# Patient Record
Sex: MALE | Race: WHITE | NOT HISPANIC OR LATINO | Employment: OTHER | URBAN - METROPOLITAN AREA
[De-identification: names, ages, dates, MRNs, and addresses within clinical notes are randomized per-mention and may not be internally consistent; named-entity substitution may affect disease eponyms.]

---

## 2017-02-23 ENCOUNTER — ANESTHESIA EVENT (OUTPATIENT)
Dept: GASTROENTEROLOGY | Facility: HOSPITAL | Age: 62
End: 2017-02-23
Payer: COMMERCIAL

## 2017-02-24 ENCOUNTER — HOSPITAL ENCOUNTER (OUTPATIENT)
Facility: HOSPITAL | Age: 62
Setting detail: OUTPATIENT SURGERY
Discharge: HOME | End: 2017-02-24
Attending: COLON & RECTAL SURGERY | Admitting: COLON & RECTAL SURGERY
Payer: COMMERCIAL

## 2017-02-24 ENCOUNTER — GENERIC CONVERSION - ENCOUNTER (OUTPATIENT)
Dept: OTHER | Facility: OTHER | Age: 62
End: 2017-02-24

## 2017-02-24 ENCOUNTER — ANESTHESIA (OUTPATIENT)
Dept: GASTROENTEROLOGY | Facility: HOSPITAL | Age: 62
End: 2017-02-24
Payer: COMMERCIAL

## 2017-02-24 VITALS
HEIGHT: 72 IN | RESPIRATION RATE: 16 BRPM | HEART RATE: 69 BPM | TEMPERATURE: 97 F | DIASTOLIC BLOOD PRESSURE: 75 MMHG | SYSTOLIC BLOOD PRESSURE: 125 MMHG | OXYGEN SATURATION: 96 % | WEIGHT: 246.91 LBS | BODY MASS INDEX: 33.44 KG/M2

## 2017-02-24 DIAGNOSIS — Z12.11 ENCOUNTER FOR SCREENING FOR MALIGNANT NEOPLASM OF COLON: ICD-10-CM

## 2017-02-24 PROCEDURE — 88305 TISSUE EXAM BY PATHOLOGIST: CPT | Performed by: COLON & RECTAL SURGERY

## 2017-02-24 RX ORDER — PROPOFOL 10 MG/ML
INJECTION, EMULSION INTRAVENOUS AS NEEDED
Status: DISCONTINUED | OUTPATIENT
Start: 2017-02-24 | End: 2017-02-24 | Stop reason: SURG

## 2017-02-24 RX ORDER — FENOFIBRATE 48 MG/1
48 TABLET, COATED ORAL DAILY
COMMUNITY
End: 2018-02-06 | Stop reason: SDUPTHER

## 2017-02-24 RX ORDER — PROPOFOL 10 MG/ML
INJECTION, EMULSION INTRAVENOUS CONTINUOUS PRN
Status: DISCONTINUED | OUTPATIENT
Start: 2017-02-24 | End: 2017-02-24 | Stop reason: SURG

## 2017-02-24 RX ORDER — LIDOCAINE HYDROCHLORIDE 10 MG/ML
INJECTION, SOLUTION INFILTRATION; PERINEURAL AS NEEDED
Status: DISCONTINUED | OUTPATIENT
Start: 2017-02-24 | End: 2017-02-24 | Stop reason: SURG

## 2017-02-24 RX ORDER — SODIUM CHLORIDE, SODIUM LACTATE, POTASSIUM CHLORIDE, CALCIUM CHLORIDE 600; 310; 30; 20 MG/100ML; MG/100ML; MG/100ML; MG/100ML
125 INJECTION, SOLUTION INTRAVENOUS CONTINUOUS
Status: DISCONTINUED | OUTPATIENT
Start: 2017-02-24 | End: 2017-02-24 | Stop reason: HOSPADM

## 2017-02-24 RX ADMIN — LIDOCAINE HYDROCHLORIDE 50 MG: 10 INJECTION, SOLUTION INFILTRATION; PERINEURAL at 10:35

## 2017-02-24 RX ADMIN — SODIUM CHLORIDE, SODIUM LACTATE, POTASSIUM CHLORIDE, AND CALCIUM CHLORIDE 125 ML/HR: .6; .31; .03; .02 INJECTION, SOLUTION INTRAVENOUS at 09:49

## 2017-02-24 RX ADMIN — PROPOFOL 140 MCG/KG/MIN: 10 INJECTION, EMULSION INTRAVENOUS at 10:35

## 2017-02-24 RX ADMIN — PROPOFOL 100 MG: 10 INJECTION, EMULSION INTRAVENOUS at 10:35

## 2017-09-20 ENCOUNTER — ALLSCRIPTS OFFICE VISIT (OUTPATIENT)
Dept: OTHER | Facility: OTHER | Age: 62
End: 2017-09-20

## 2017-09-20 DIAGNOSIS — Z00.00 ENCOUNTER FOR GENERAL ADULT MEDICAL EXAMINATION WITHOUT ABNORMAL FINDINGS: ICD-10-CM

## 2017-09-20 DIAGNOSIS — Z12.5 ENCOUNTER FOR SCREENING FOR MALIGNANT NEOPLASM OF PROSTATE: ICD-10-CM

## 2017-09-20 DIAGNOSIS — I10 ESSENTIAL (PRIMARY) HYPERTENSION: ICD-10-CM

## 2017-09-20 DIAGNOSIS — E78.5 HYPERLIPIDEMIA: ICD-10-CM

## 2017-10-27 NOTE — PROGRESS NOTES
Assessment  1  Annual physical exam (V70 0) (Z00 00)   2  Hyperlipidemia (272 4) (E78 5)   3  Hypertension, essential (401 9) (I10)    Plan  Annual physical exam, Hyperlipidemia, Hypertension, essential, Screening for prostate  cancer    · (1) CBC/PLT/DIFF; Status:Active; Requested for:49Ovj2561;    · (1) COMPREHENSIVE METABOLIC PANEL; Status:Active; Requested for:97Ias8530;    · (1) LIPID PANEL, FASTING; Status:Active; Requested for:94Zte3120;    · (1) PSA (SCREEN) (Dx V76 44 Screen for Prostate Cancer); Status:Active; Requested  for:21Dhi3035;   Hyperlipidemia    · Fenofibrate 48 MG Oral Tablet (Tricor); TAKE 1 TABLET DAILY    Discussion/Summary    #1 PEhyperlipidemia - check labs as above  Cont present medsHTN - BPs had improved with weight loss and increased exercise  BP acceptable now but weight has increased  REC: cont exercise  Cont weight loss effort  Check labs as aboveHM - I reviewed HM issues  REC: check labs as above  Refer for colonoscopy  Recheck 1 year or prn  Pt to call for problems or concerns in the interim  The patient was counseled regarding instructions for management,-- risk factor reductions,-- prognosis,-- patient and family education,-- impressions,-- risks and benefits of treatment options,-- importance of compliance with treatment  Possible side effects of new medications were reviewed with the patient/guardian today  The treatment plan was reviewed with the patient/guardian  The patient/guardian understands and agrees with the treatment plan   Impression: health maintenance visit  Currently, he eats an adequate diet and has an adequate exercise regimen  Prostate cancer screening: the risks and benefits of prostate cancer screening were discussed and PSA was ordered  Colorectal cancer screening: the risks and benefits of colorectal cancer screening were discussed and colonoscopy has been ordered  Screening lab work includes glucose and lipid profile   The risks and benefits of immunizations were discussed and immunizations are up to date  Advice and education were given regarding nutrition, aerobic exercise, weight loss and cardiovascular risk reduction  Possible side effects of new medications were reviewed with the patient/guardian today  Chief Complaint  6MO CK UP      History of Present Illness  as above - pt here fpr yearly exampt doing well  Last seen 9/17/16  Doing well   No new complaints  Still tries to walks 2-3mi, 3-4x a week  No CP, palp, lightheadedness or other CV symptomspt had colonoscopy in Feb - due again in 3 years  Up to date with eye examsoverdue for Uro kamari  Had seen Dr Prudencio Aguirre in the past, but has not seen him this year    The patient is being seen for a health maintenance evaluation  General Health: The patient's health since the last visit is described as good  He has regular dental visits  -- He denies vision problems  -- He denies hearing loss  Immunizations status: not up to date  Lifestyle:  He consumes a diverse and healthy diet  -- He does not have any weight concerns  -- He exercises regularly  -- He does not use tobacco -- He denies alcohol use  Screening: cancer screening reviewed and current  metabolic screening reviewed and current  risk screening reviewed and current  Review of Systems    Constitutional: No fever or chills, feels well, no tiredness, no recent weight gain or weight loss  Eyes: No complaints of eye pain, no red eyes, no discharge from eyes, no itchy eyes  ENT: no complaints of earache, no hearing loss, no nosebleeds, no nasal discharge, no sore throat, no hoarseness  Cardiovascular: No complaints of slow heart rate, no fast heart rate, no chest pain, no palpitations, no leg claudication, no lower extremity  Respiratory: No complaints of shortness of breath, no wheezing, no cough, no SOB on exertion, no orthopnea or PND     Gastrointestinal: No complaints of abdominal pain, no constipation, no nausea or vomiting, no diarrhea or bloody stools  Genitourinary: No complaints of dysuria, no incontinence, no hesitancy, no nocturia, no genital lesion, no testicular pain  Musculoskeletal: No complaints of arthralgia, no myalgias, no joint swelling or stiffness, no limb pain or swelling  Integumentary: No complaints of skin rash or skin lesions, no itching, no skin wound, no dry skin  Neurological: No compliants of headache, no confusion, no convulsions, no numbness or tingling, no dizziness or fainting, no limb weakness, no difficulty walking  Endocrine: No complaints of proptosis, no hot flashes, no muscle weakness, no erectile dysfunction, no deepening of the voice, no feelings of weakness  Hematologic/Lymphatic: No complaints of swollen glands, no swollen glands in the neck, does not bleed easily, no easy bruising  Active Problems  1  Annual physical exam (V70 0) (Z00 00)   2  History of allergy (V15 09) (Z88 9)   3  Hyperlipidemia (272 4) (E78 5)   4  Hypertension, essential (401 9) (I10)   5  Neck pain (723 1) (M54 2)   6  Screening for colorectal cancer (V76 51) (Z12 11,Z12 12)   7  Screening for prostate cancer (V76 44) (Z12 5)    Past Medical History    The active problems and past medical history were reviewed and updated today  Surgical History  1  History of Hernia Repair    Family History  Mother    1  Family history of Diabetes Mellitus (V18 0)  Father    2  Family history of Stroke Syndrome (V17 1)    Social History   · Denied: History of Alcohol Use (History)   · Denied: History of Daily Coffee Consumption (___ Cups/Day)   · Daily Cola Consumption (___ Cans/Day)   · Denied: History of Daily Tea Consumption (___ Cups/Day)   · Never A Smoker  The social history was reviewed and updated today  Current Meds   1  Fenofibrate 48 MG Oral Tablet; TAKE 1 TABLET DAILY;    Therapy: 31JPY7830 to (Houston Bynum)  Requested for: 08Apr2017; Last   Rx:08Apr2017 Ordered    The medication list was reviewed and updated today  Allergies  1  Penicillins    Vitals  Vital Signs    Recorded: 03XIE6269 11:33AM Recorded: 47MXF9676 10:43AM   Temperature  96 9 F   Heart Rate  76   Systolic 279, LUE, Sitting 986   Diastolic 86, LUE, Sitting 94   Height  5 ft 11 in   Weight  252 lb 4 oz   BMI Calculated  35 18   BSA Calculated  2 33     Physical Exam    Constitutional   General appearance: No acute distress, well appearing and well nourished  Head and Face   Head and face: Normal     Eyes   Conjunctiva and lids: No erythema, swelling or discharge  Pupils and irises: Equal, round, reactive to light  Ophthalmoscopic examination: Normal fundi and optic discs  Ears, Nose, Mouth, and Throat   External inspection of ears and nose: Normal     Otoscopic examination: Tympanic membranes translucent with normal light reflex  Canals patent without erythema  Nasal mucosa, septum, and turbinates: Normal without edema or erythema  Lips, teeth, and gums: Normal, good dentition  Oropharynx: Normal with no erythema, edema, exudate or lesions  Neck   Neck: Supple, symmetric, trachea midline, no masses  Thyroid: Normal, no thyromegaly  Pulmonary   Respiratory effort: No increased work of breathing or signs of respiratory distress  Auscultation of lungs: Clear to auscultation  Cardiovascular   Auscultation of heart: Normal rate and rhythm, normal S1 and S2, no murmurs  Carotid pulses: 2+ bilaterally  Abdominal aorta: Normal     Pedal pulses: 2+ bilaterally  Peripheral vascular exam: Normal     Examination of extremities for edema and/or varicosities: Normal     Abdomen   Abdomen: Non-tender, no masses  Liver and spleen: No hepatomegaly or splenomegaly  Lymphatic   Palpation of lymph nodes in neck: No lymphadenopathy  Palpation of lymph nodes in other areas: No lymphadenopathy      Musculoskeletal   Gait and station: Normal     Inspection/palpation of digits and nails: Normal without clubbing or cyanosis  Inspection/palpation of joints, bones, and muscles: Normal     Muscle strength/tone: Normal     Skin   Skin and subcutaneous tissue: Normal without rashes or lesions  Neurologic   Cranial nerves: Cranial nerves 2-12 intact  Cortical function: Normal mental status  Reflexes: 2+ and symmetric  Sensation: No sensory loss  Psychiatric   Judgment and insight: Normal     Orientation to person, place and time: Normal     Recent and remote memory: Intact  Mood and affect: Normal        Results/Data  PHQ-2 Adult Depression Screening 20Sep2017 10:46AM User, s     Test Name Result Flag Reference   PHQ-2 Adult Depression Score 0     Over the last two weeks, how often have you been bothered by any of the following problems? Little interest or pleasure in doing things: Not at all - 0  Feeling down, depressed, or hopeless: Not at all - 0   PHQ-2 Adult Depression Screening Negative         Health Management  Screening for colorectal cancer   COLONOSCOPY; every 10 years; Last 46KBH7125; Next Due: 54Ivb3479; Active    Future Appointments    Date/Time Provider Specialty Site   09/25/2018 09:45 AM JOE Robledo   [address] [place] [address]     Signatures   Electronically signed by : JOE Blancas ; Sep 22 2017  7:25AM EST                       (Author)

## 2017-11-07 ENCOUNTER — ALLSCRIPTS OFFICE VISIT (OUTPATIENT)
Dept: OTHER | Facility: OTHER | Age: 62
End: 2017-11-07

## 2018-01-11 NOTE — PROGRESS NOTES
Assessment    1  Hyperlipidemia (272 4) (E78 5)   2  Blood pressure elevated (401 9) (I10)   3  Annual physical exam (V70 0) (Z00 00)    Plan  Annual physical exam, Blood pressure elevated, Hyperlipidemia    · (1) CBC/PLT/DIFF; Status:Active; Requested for:23Vwd2285;    · (1) COMPREHENSIVE METABOLIC PANEL; Status:Active; Requested for:93Hka7493;    · (1) LIPID PANEL, FASTING; Status:Active; Requested for:22Jwu6569;    · (1) TSH; Status:Active; Requested for:21Vkx1088;   Hyperlipidemia    · Fenofibrate 48 MG Oral Tablet (Tricor); TAKE 1 TABLET DAILY  Screening for prostate cancer    · (1) PSA (SCREEN) (Dx V76 44 Screen for Prostate Cancer); Status:Active; Requested  for:20Sep2016;     Discussion/Summary  Impression: health maintenance visit  Currently, he eats an adequate diet and has an adequate exercise regimen  Prostate cancer screening: the risks and benefits of prostate cancer screening were discussed and PSA was ordered  Colorectal cancer screening: the risks and benefits of colorectal cancer screening were discussed and colonoscopy has been ordered  Screening lab work includes glucose and lipid profile  The risks and benefits of immunizations were discussed and immunizations are up to date  Advice and education were given regarding nutrition, aerobic exercise, weight loss and cardiovascular risk reduction  #1 PE  #2 hyperlipidemia - check labs as above  #3 allergy - stable  #4 HM - I reviewed HM issues  REC: check labs as above  Refer for colonoscopy  Recheck 1 year or prn  Pt to call for problems or concerns in the interim  Possible side effects of new medications were reviewed with the patient/guardian today  The patient was counseled regarding instructions for management, risk factor reductions, prognosis, patient and family education, impressions, risks and benefits of treatment options, importance of compliance with treatment     Possible side effects of new medications were reviewed with the patient/guardian today  The treatment plan was reviewed with the patient/guardian  The patient/guardian understands and agrees with the treatment plan      Chief Complaint  6MO CK UP      History of Present Illness  HM, Adult Male: The patient is being seen for a health maintenance evaluation  General Health: The patient's health since the last visit is described as good  He has regular dental visits  He denies vision problems  He denies hearing loss  Immunizations status: up to date  Lifestyle:  He consumes a diverse and healthy diet  He does not have any weight concerns  He exercises regularly  He does not use tobacco  He denies alcohol use  He denies drug use  Screening: cancer screening reviewed and current  metabolic screening reviewed and current  risk screening reviewed and current  HPI: as above - pt here fpr yearly exam  - pt doing well  Last seen 9/17/15    - Doing well with retirmbrad Rice 2-3mi 3-4x a week  No CP, palp, lightheadedness or other CV symptoms  - overdue for Uro kamari  Had seen Dr Agusto Alfaro in the past, but has not seen him this year  - never had a colonoscopy but is willing to go  [facility]end Up to date with eye exams      Review of Systems    Constitutional: No fever or chills, feels well, no tiredness, no recent weight gain or weight loss  Eyes: No complaints of eye pain, no red eyes, no discharge from eyes, no itchy eyes  ENT: no complaints of earache, no hearing loss, no nosebleeds, no nasal discharge, no sore throat, no hoarseness  Cardiovascular: No complaints of slow heart rate, no fast heart rate, no chest pain, no palpitations, no leg claudication, no lower extremity  Respiratory: No complaints of shortness of breath, no wheezing, no cough, no SOB on exertion, no orthopnea or PND  Gastrointestinal: No complaints of abdominal pain, no constipation, no nausea or vomiting, no diarrhea or bloody stools     Genitourinary: No complaints of dysuria, no incontinence, no hesitancy, no nocturia, no genital lesion, no testicular pain  Musculoskeletal: No complaints of arthralgia, no myalgias, no joint swelling or stiffness, no limb pain or swelling  Integumentary: No complaints of skin rash or skin lesions, no itching, no skin wound, no dry skin  Neurological: No compliants of headache, no confusion, no convulsions, no numbness or tingling, no dizziness or fainting, no limb weakness, no difficulty walking  Endocrine: No complaints of proptosis, no hot flashes, no muscle weakness, no erectile dysfunction, no deepening of the voice, no feelings of weakness  Hematologic/Lymphatic: No complaints of swollen glands, no swollen glands in the neck, does not bleed easily, no easy bruising  Active Problems    1  Blood pressure elevated (401 9) (I10)   2  History of allergy (V15 09) (Z88 9)   3  Hyperlipidemia (272 4) (E78 5)   4  Neck pain (723 1) (M54 2)   5  Screening for colorectal cancer (V76 51) (Z12 11,Z12 12)   6  Screening for prostate cancer (V76 44) (Z12 5)    Past Medical History    The active problems and past medical history were reviewed and updated today  Surgical History    · History of Hernia Repair    Family History  Mother    · Family history of Diabetes Mellitus (V18 0)  Father    · Family history of Stroke Syndrome (V17 1)    Social History    · Denied: History of Alcohol Use (History)   · Denied: History of Daily Coffee Consumption (___ Cups/Day)   · Daily Cola Consumption (___ Cans/Day)   · Denied: History of Daily Tea Consumption (___ Cups/Day)   · Never A Smoker  The social history was reviewed and updated today  Current Meds   1  Fenofibrate 48 MG Oral Tablet; TAKE 1 TABLET DAILY; Therapy: 82FDK5212 to (Renew:01Oct2016)  Requested for: 04Apr2016; Last   Rx:04Apr2016 Ordered    The medication list was reviewed and updated today  Allergies    1   Penicillins    Vitals   Recorded: 91DQX6083 11:18AM Recorded: 86PDU6871 83:15JS   Systolic 927, HUY Sitting 134   Diastolic 86, LUE, Sitting 80   Heart Rate  80   Temperature  97 7 F   Height  5 ft 11 in   Weight  248 lb    BMI Calculated  34 59   BSA Calculated  2 3     Physical Exam    Constitutional   General appearance: No acute distress, well appearing and well nourished  Head and Face   Head and face: Normal     Eyes   Conjunctiva and lids: No erythema, swelling or discharge  Pupils and irises: Equal, round, reactive to light  Ophthalmoscopic examination: Normal fundi and optic discs  Ears, Nose, Mouth, and Throat   External inspection of ears and nose: Normal     Otoscopic examination: Tympanic membranes translucent with normal light reflex  Canals patent without erythema  Nasal mucosa, septum, and turbinates: Normal without edema or erythema  Lips, teeth, and gums: Normal, good dentition  Oropharynx: Normal with no erythema, edema, exudate or lesions  Neck   Neck: Supple, symmetric, trachea midline, no masses  Thyroid: Normal, no thyromegaly  Pulmonary   Respiratory effort: No increased work of breathing or signs of respiratory distress  Auscultation of lungs: Clear to auscultation  Cardiovascular   Auscultation of heart: Normal rate and rhythm, normal S1 and S2, no murmurs  Carotid pulses: 2+ bilaterally  Abdominal aorta: Normal     Pedal pulses: 2+ bilaterally  Peripheral vascular exam: Normal     Examination of extremities for edema and/or varicosities: Normal     Abdomen   Abdomen: Non-tender, no masses  Liver and spleen: No hepatomegaly or splenomegaly  Lymphatic   Palpation of lymph nodes in neck: No lymphadenopathy  Palpation of lymph nodes in other areas: No lymphadenopathy  Musculoskeletal   Gait and station: Normal     Inspection/palpation of digits and nails: Normal without clubbing or cyanosis      Inspection/palpation of joints, bones, and muscles: Normal     Muscle strength/tone: Normal     Skin   Skin and subcutaneous tissue: Normal without rashes or lesions  Neurologic   Cranial nerves: Cranial nerves 2-12 intact  Cortical function: Normal mental status  Reflexes: 2+ and symmetric  Sensation: No sensory loss  Coordination: Normal finger to nose and heel to shin  Psychiatric   Judgment and insight: Normal     Orientation to person, place and time: Normal     Recent and remote memory: Intact  Mood and affect: Normal        Results/Data  PHQ-2 Adult Depression Screening 20Sep2016 10:44AM User, Ahs     Test Name Result Flag Reference   PHQ-2 Adult Depression Score 0     Over the last two weeks, how often have you been bothered by any of the following problems? Little interest or pleasure in doing things: Not at all - 0  Feeling down, depressed, or hopeless: Not at all - 0   PHQ-2 Adult Depression Screening Negative         Health Management  Screening for colorectal cancer   COLONOSCOPY; every 10 years; Next Due: [id number]; Overdue    Future Appointments    Date/Time Provider Specialty Site   09/20/2017 10:45 AM JOE Sherman   [address] [facility]     Signatures   Electronically signed by : JOE Shannon ; Sep 21 2016  8:38AM EST                       (Author)

## 2018-01-14 VITALS
HEIGHT: 71 IN | SYSTOLIC BLOOD PRESSURE: 138 MMHG | DIASTOLIC BLOOD PRESSURE: 86 MMHG | WEIGHT: 252.25 LBS | HEART RATE: 76 BPM | TEMPERATURE: 96.9 F | BODY MASS INDEX: 35.31 KG/M2

## 2018-01-14 NOTE — PROGRESS NOTES
Chief Complaint  PT IS HERE FOR A FLU SHOT      Active Problems    1  Annual physical exam (V70 0) (Z00 00)   2  History of allergy (V15 09) (Z88 9)   3  Hyperlipidemia (272 4) (E78 5)   4  Hypertension, essential (401 9) (I10)   5  Neck pain (723 1) (M54 2)   6  Screening for colorectal cancer (V76 51) (Z12 11,Z12 12)   7  Screening for prostate cancer (V76 44) (Z12 5)    Current Meds   1  Fenofibrate 48 MG Oral Tablet; TAKE 1 TABLET DAILY; Therapy: 39FBS5298 to (Evaluate:18Jan2018)  Requested for: 12AVU6854; Last   Rx:93Iqw4948 Ordered    Allergies    1  Penicillins    Plan  Need for influenza vaccination    · Fluzone Quadrivalent Intramuscular Suspension    Future Appointments    Date/Time Provider Specialty Site   09/25/2018 09:45 AM JOE Quevedo   [address] [facility]     Signatures   Electronically signed by : Hammad Caro, ; Nov 7 2017 10:11AM EST                       (Author)    Electronically signed by : JOE Valencia ; Nov 7 2017 10:21AM EST                       (Author)

## 2018-02-06 ENCOUNTER — TELEPHONE (OUTPATIENT)
Dept: FAMILY MEDICINE CLINIC | Facility: CLINIC | Age: 63
End: 2018-02-06

## 2018-02-06 DIAGNOSIS — E78.2 MIXED HYPERLIPIDEMIA: Primary | ICD-10-CM

## 2018-02-06 RX ORDER — FENOFIBRATE 48 MG/1
48 TABLET, COATED ORAL DAILY
Qty: 90 TABLET | Refills: 3 | Status: SHIPPED | OUTPATIENT
Start: 2018-02-06 | End: 2019-03-07

## 2018-06-08 ENCOUNTER — OFFICE VISIT (OUTPATIENT)
Dept: FAMILY MEDICINE CLINIC | Facility: CLINIC | Age: 63
End: 2018-06-08
Payer: COMMERCIAL

## 2018-06-08 VITALS
SYSTOLIC BLOOD PRESSURE: 170 MMHG | DIASTOLIC BLOOD PRESSURE: 98 MMHG | HEIGHT: 72 IN | BODY MASS INDEX: 33.94 KG/M2 | WEIGHT: 250.6 LBS | HEART RATE: 68 BPM | TEMPERATURE: 97.3 F

## 2018-06-08 DIAGNOSIS — J30.2 SEASONAL ALLERGIC RHINITIS, UNSPECIFIED TRIGGER: ICD-10-CM

## 2018-06-08 DIAGNOSIS — R05.9 COUGH: Primary | ICD-10-CM

## 2018-06-08 PROCEDURE — 99213 OFFICE O/P EST LOW 20 MIN: CPT | Performed by: NURSE PRACTITIONER

## 2018-06-08 RX ORDER — BENZONATATE 100 MG/1
100 CAPSULE ORAL 3 TIMES DAILY PRN
Qty: 15 CAPSULE | Refills: 0 | Status: SHIPPED | OUTPATIENT
Start: 2018-06-08 | End: 2018-06-13

## 2018-06-08 NOTE — PROGRESS NOTES
Patient ID: Sim Phelan  is a 57 y o  male  HPI: 57 y o male presenting with nasal congestion and cough for past week  The nasal congestion is improving since starting to take OTC guaifenesin  Patient also reports he as been using Delsym cough medication without cough control  SUBJECTIVE    Family History   Problem Relation Age of Onset    Diabetes Mother     Ulcerative colitis Son     Stroke Father      SYNDROME      Social History     Social History    Marital status: /Civil Union     Spouse name: N/A    Number of children: N/A    Years of education: N/A     Occupational History    Not on file  Social History Main Topics    Smoking status: Never Smoker    Smokeless tobacco: Not on file    Alcohol use Yes      Comment: 1-2 per week; DENIED: HISTORY OF ALCOHOL USE AS PER ALL SCRIPTS    Drug use: No    Sexual activity: Not on file     Other Topics Concern    Not on file     Social History Narrative    Denied: history of daily coffee consumption    Daily cola consumption    Denied: history of daily tea consumption      Past Medical History:   Diagnosis Date    Elevated cholesterol with high triglycerides      Past Surgical History:   Procedure Laterality Date    HERNIA REPAIR Left     inguinal    [place] COLONOSCOPY FLX DX W/COLLJ SPEC WHEN PFRMD N/A 2/24/2017    Procedure: COLONOSCOPY;  Surgeon: Timothy Rodriguez MD;  Location: [facility];   Service: Colorectal     Allergies   Allergen Reactions    Penicillins Rash       Current Outpatient Prescriptions:     fenofibrate (TRICOR) 48 mg tablet, Take 1 tablet (48 mg total) by mouth daily for 360 days, Disp: 90 tablet, Rfl: 3    benzonatate (TESSALON PERLES) 100 mg capsule, Take 1 capsule (100 mg total) by mouth 3 (three) times a day as needed for cough for up to 5 days, Disp: 15 capsule, Rfl: 0    Review of Systems    Consitutional:  Denies chills, fatigue and fever   ENT:  Positive for nasal congestion for thick clear secretions Denies nasal discharge, post nasal drip, sore throat, hoarseness, itchy/watery eyes and sneezing  Pulmonary:  Positive for loose productive cough for clear to white phelgm    Denies shortness of breath  dyspnea on exertion or wheezing  Cardiovascular:  Denies chest pain/pressure   Musculoskeletal:  Denies myalgia, arthalgia or muscle weakness    Integumentary:  Denies rash or skin lesions   Neurological:  Denies headaches, dizziness, confusion, loss of consciousness or behavioral changes  Psychological:  Denies anxiety, depression or sleep disturbances      OBJECTIVE    /98   Pulse 68   Temp (!) 97 3 °F (36 3 °C)   Ht 6' (1 829 m)   Wt 114 kg (250 lb 9 6 oz)   BMI 33 99 kg/m²     Constitutional:  Well appearing and in no acute distress  ENT:  TM normal without fluid or infection, slight posterior pharynx erythema with   post nasal drip noted, sinuses non-tender and nasal mucosa pale and congested   Pulmonary:  clear to auscultation bilaterally and no crackles, no wheezes, chest expansion normal  Cardiovascular:  S1S2, regular rate and rhythm   Blood pressure elevated with potential link to OTC cough medication usage  Lymphatic:  no lymphadenopathy   Musculoskeletal:  no muscular tenderness noted  Skin:  skin color, texture and turgor are normal; no bruising, rashes or lesions noted  Neurologic:  Alert and oriented x 4 and Affect and mood normal      Assessment/Plan:  Diagnoses and all orders for this visit:    Cough  -     benzonatate (TESSALON PERLES) 100 mg capsule; Take 1 capsule (100 mg total) by mouth 3 (three) times a day as needed for cough for up to 5 days    Seasonal allergic rhinitis, unspecified trigger      #1 Cough  Reviewed with patient plan to treat with benzonatate 100 mg three times a day as needed for cough    #2 Seasonal allergic rhinitis  Reviewed with patient plan to start an OTC anti-histamine  Patient instructed to call in 72 hours if not feeling better or if symptoms worsen

## 2018-06-19 ENCOUNTER — OFFICE VISIT (OUTPATIENT)
Dept: FAMILY MEDICINE CLINIC | Facility: CLINIC | Age: 63
End: 2018-06-19
Payer: COMMERCIAL

## 2018-06-19 VITALS
BODY MASS INDEX: 33.83 KG/M2 | HEART RATE: 72 BPM | RESPIRATION RATE: 16 BRPM | SYSTOLIC BLOOD PRESSURE: 154 MMHG | TEMPERATURE: 97.6 F | DIASTOLIC BLOOD PRESSURE: 82 MMHG | HEIGHT: 72 IN | WEIGHT: 249.8 LBS

## 2018-06-19 DIAGNOSIS — J18.0 BRONCHOPNEUMONIA: Primary | ICD-10-CM

## 2018-06-19 PROCEDURE — 99213 OFFICE O/P EST LOW 20 MIN: CPT | Performed by: FAMILY MEDICINE

## 2018-06-19 RX ORDER — LEVOFLOXACIN 500 MG/1
500 TABLET, FILM COATED ORAL EVERY 24 HOURS
Qty: 10 TABLET | Refills: 0 | Status: SHIPPED | OUTPATIENT
Start: 2018-06-19 | End: 2018-06-29

## 2018-06-19 RX ORDER — DEXTROMETHORPHAN HYDROBROMIDE AND PROMETHAZINE HYDROCHLORIDE 15; 6.25 MG/5ML; MG/5ML
5 SYRUP ORAL
Qty: 118 ML | Refills: 0 | Status: SHIPPED | OUTPATIENT
Start: 2018-06-19 | End: 2018-11-02 | Stop reason: ALTCHOICE

## 2018-06-19 NOTE — PROGRESS NOTES
Assessment/Plan:      There are no diagnoses linked to this encounter  Subjective:     Patient ID: Megan Griffith  is a 57 y o  male  2+ week hx of cough  Started after having URI symptoms  Seen on 6/8 by NP Calin Somers and started on tessalon  Pt did not improve and is here for recheck  No fever/chills, fatigue, or SOB  Pt does have a productive cough  No nasal congestion  No Hx of smoking  Review of Systems   Constitutional: Negative  HENT: Negative  Eyes: Negative  Respiratory: Positive for cough  Negative for chest tightness, shortness of breath and wheezing  Cardiovascular: Negative  Musculoskeletal: Negative  Objective:     Physical Exam   Constitutional: He appears well-developed and well-nourished  HENT:   Head: Normocephalic and atraumatic  Right Ear: External ear normal    Left Ear: External ear normal    Nose: Nose normal    Mouth/Throat: Oropharynx is clear and moist    Eyes: Conjunctivae and EOM are normal  Pupils are equal, round, and reactive to light  Neck: Normal range of motion  Cardiovascular: Normal rate, regular rhythm and intact distal pulses  Pulmonary/Chest: Effort normal  No respiratory distress  He has wheezes  He exhibits no tenderness  Focal wheeze/rhonchi in MAK anterior and posterior   Lymphadenopathy:     He has no cervical adenopathy  Skin: Skin is warm

## 2018-07-09 ENCOUNTER — APPOINTMENT (OUTPATIENT)
Dept: RADIOLOGY | Facility: MEDICAL CENTER | Age: 63
End: 2018-07-09
Payer: COMMERCIAL

## 2018-07-09 DIAGNOSIS — J18.0 BRONCHOPNEUMONIA: ICD-10-CM

## 2018-07-09 PROCEDURE — 71046 X-RAY EXAM CHEST 2 VIEWS: CPT

## 2018-07-27 ENCOUNTER — OFFICE VISIT (OUTPATIENT)
Dept: FAMILY MEDICINE CLINIC | Facility: CLINIC | Age: 63
End: 2018-07-27
Payer: COMMERCIAL

## 2018-07-27 VITALS
HEART RATE: 80 BPM | DIASTOLIC BLOOD PRESSURE: 82 MMHG | HEIGHT: 72 IN | SYSTOLIC BLOOD PRESSURE: 126 MMHG | WEIGHT: 246.8 LBS | BODY MASS INDEX: 33.43 KG/M2 | TEMPERATURE: 97.1 F

## 2018-07-27 DIAGNOSIS — H60.501 ACUTE OTITIS EXTERNA OF RIGHT EAR, UNSPECIFIED TYPE: Primary | ICD-10-CM

## 2018-07-27 PROCEDURE — 99213 OFFICE O/P EST LOW 20 MIN: CPT | Performed by: NURSE PRACTITIONER

## 2018-07-27 RX ORDER — CIPROFLOXACIN AND DEXAMETHASONE 3; 1 MG/ML; MG/ML
4 SUSPENSION/ DROPS AURICULAR (OTIC) 2 TIMES DAILY
Qty: 7.5 ML | Refills: 0 | Status: SHIPPED | OUTPATIENT
Start: 2018-07-27 | End: 2018-11-02 | Stop reason: ALTCHOICE

## 2018-07-27 NOTE — PROGRESS NOTES
Patient ID: Paul Barger  is a 57 y o  male  HPI: 57 y o male presenting with right ear pressure for past three days  He reports that he cleaned his ear wax out for few days ago and than the pressure started  Denies fever, chills, nasal congestion, cough or general fatigue with the above symptom  SUBJECTIVE    Family History   Problem Relation Age of Onset    Diabetes Mother     Ulcerative colitis Son     Stroke Father         SYNDROME      Social History     Social History    Marital status: /Civil Union     Spouse name: N/A    Number of children: N/A    Years of education: N/A     Occupational History    Not on file  Social History Main Topics    Smoking status: Never Smoker    Smokeless tobacco: Not on file    Alcohol use Yes      Comment: 1-2 per week; DENIED: HISTORY OF ALCOHOL USE AS PER ALL SCRIPTS    Drug use: No    Sexual activity: Not on file     Other Topics Concern    Not on file     Social History Narrative    Denied: history of daily coffee consumption    Daily cola consumption    Denied: history of daily tea consumption      Past Medical History:   Diagnosis Date    Elevated cholesterol with high triglycerides      Past Surgical History:   Procedure Laterality Date    HERNIA REPAIR Left     inguinal    [place] COLONOSCOPY FLX DX W/COLLJ SPEC WHEN PFRMD N/A 2/24/2017    Procedure: COLONOSCOPY;  Surgeon: Alesha Fox MD;  Location: [facility];   Service: Colorectal     Allergies   Allergen Reactions    Penicillins Rash       Current Outpatient Prescriptions:     fenofibrate (TRICOR) 48 mg tablet, Take 1 tablet (48 mg total) by mouth daily for 360 days, Disp: 90 tablet, Rfl: 3    promethazine-dextromethorphan (PHENERGAN-DM) 6 25-15 mg/5 mL oral syrup, Take 5 mL by mouth daily at bedtime, Disp: 118 mL, Rfl: 0    Review of Systems    Consitutional:  Denies chills, fatigue and fever   ENT:  Positive for right ear pressure    Denies loss of hearing, nasal congestion, post nasal drip, sore throat and hoarseness   Pulmonary:  Denies cough, shortness of breath or dyspnea on exertion    Cardiovascular:  Denies chest pain/pressure     Integumentary:   Denies ecchymosis, petechiae, rash or lesions  Neurological:  Denies headaches, dizziness, confusion, loss of consciousness or behavioral changes  Psychological:  Denies anxiety, depression or sleep disturbances      OBJECTIVE    /82   Pulse 80   Temp (!) 97 1 °F (36 2 °C)   Ht 6' (1 829 m)   Wt 112 kg (246 lb 12 8 oz)   BMI 33 47 kg/m²     Constitutional:  Well appearing and in no acute distress  ENT:  Right TM dull and canal erythematous, pain on palpation of external ear, throat normal without erythema or exudate, sinuses non-tender and nasal mucosa non-congested   Pulmonary:  clear to auscultation bilaterally and no crackles, no wheezes, chest expansion normal  Cardiovascular:  S1S2, regular rate and rhythm  Lymphatic:  no lymphadenopathy   Skin:  skin color, texture and turgor are normal; no bruising, rashes or lesions noted  Neurologic:  Alert and oriented x 4 and Affect and mood normal      Assessment/Plan:  Diagnoses and all orders for this visit:    Acute otitis externa of right ear, unspecified type  -     ciprofloxacin-dexamethasone (CIPRODEX) otic suspension; Administer 4 drops to the right ear 2 (two) times a day      Acute otitis externa of right ear  Reviewed with patient plan to treat with Ciprodex otic suspension - 4 drops in right ear twice a day for 5 days  Patient instructed to call in 72 hours if not feeling better or if symptoms worsen

## 2018-09-26 ENCOUNTER — OFFICE VISIT (OUTPATIENT)
Dept: FAMILY MEDICINE CLINIC | Facility: CLINIC | Age: 63
End: 2018-09-26
Payer: COMMERCIAL

## 2018-09-26 VITALS
HEART RATE: 72 BPM | TEMPERATURE: 96.7 F | SYSTOLIC BLOOD PRESSURE: 152 MMHG | BODY MASS INDEX: 33.72 KG/M2 | DIASTOLIC BLOOD PRESSURE: 96 MMHG | HEIGHT: 72 IN | WEIGHT: 249 LBS

## 2018-09-26 DIAGNOSIS — Z23 IMMUNIZATION DUE: ICD-10-CM

## 2018-09-26 DIAGNOSIS — I10 HYPERTENSION, ESSENTIAL: Primary | ICD-10-CM

## 2018-09-26 DIAGNOSIS — E78.2 MIXED HYPERLIPIDEMIA: ICD-10-CM

## 2018-09-26 DIAGNOSIS — C61 PROSTATE CANCER (HCC): ICD-10-CM

## 2018-09-26 PROCEDURE — G0009 ADMIN PNEUMOCOCCAL VACCINE: HCPCS

## 2018-09-26 PROCEDURE — 99213 OFFICE O/P EST LOW 20 MIN: CPT | Performed by: FAMILY MEDICINE

## 2018-09-26 PROCEDURE — 90670 PCV13 VACCINE IM: CPT

## 2018-09-26 PROCEDURE — G0008 ADMIN INFLUENZA VIRUS VAC: HCPCS

## 2018-09-26 PROCEDURE — 90662 IIV NO PRSV INCREASED AG IM: CPT

## 2018-09-26 NOTE — PROGRESS NOTES
Assessment/Plan:    Hypertension, essential  Blood pressure elevated today  Recheck shows blood pressure still be elevated  I will have him come back in 2 weeks  He will bring his home blood pressure monitor to check for accuracy  If still elevated, we will adjust medications  Check labs in the interim    Hyperlipidemia  Check labs as above  Diagnoses and all orders for this visit:    Hypertension, essential  -     CBC and differential; Future  -     Comprehensive metabolic panel; Future  -     Lipid panel; Future    Mixed hyperlipidemia    Prostate cancer (HCC)  -     PSA, Total Screen; Future    Immunization due  -     Cancel: SYRINGE/SINGLE-DOSE VIAL: influenza vaccine, [id number], quadrivalent, 0 5 mL, preservative-free, for patients 3+ yr (FLUZONE)  -     PNEUMOCOCCAL CONJUGATE VACCINE 13-VALENT GREATER THAN 6 MONTHS  -     influenza vaccine, [id number], high-dose, PF 0 5 mL, for patients 65 yr+ (FLUZONE HIGH-DOSE)          Subjective:      Patient ID: Sarmad Lee  is a 70 y o  male  f/u multiple med issues  - patient states that he has been doing well  He is compliant with medications  He denies chest pain, palpitations, lightheadedness or other cardiovascular symptoms with without exertion  Patient is not exercising regularly but is trying to watch diet  -patient denies any GI or  complaints at present  Patient is up-to-date with colonoscopy  - patient denies any other problems or concerns  Patient is overdue for labs  The following portions of the patient's history were reviewed and updated as appropriate:   He  has a past medical history of Elevated cholesterol with high triglycerides  He   Patient Active Problem List    Diagnosis Date Noted    Hypertension, essential 03/11/2015    Hyperlipidemia 11/08/2012     He  has a past surgical history that includes Hernia repair (Left) and pr colonoscopy flx dx w/collj spec when pfrmd (N/A, 2/24/2017)    He  reports that he has never smoked  He does not have any smokeless tobacco history on file  He reports that he drinks alcohol  He reports that he does not use drugs  Current Outpatient Prescriptions   Medication Sig Dispense Refill    fenofibrate (TRICOR) 48 mg tablet Take 1 tablet (48 mg total) by mouth daily for 360 days 90 tablet 3    ciprofloxacin-dexamethasone (CIPRODEX) otic suspension Administer 4 drops to the right ear 2 (two) times a day (Patient not taking: Reported on 9/26/2018 ) 7 5 mL 0    promethazine-dextromethorphan (PHENERGAN-DM) 6 25-15 mg/5 mL oral syrup Take 5 mL by mouth daily at bedtime (Patient not taking: Reported on 9/26/2018 ) 118 mL 0     No current facility-administered medications for this visit  He is allergic to penicillins       Review of Systems   Constitutional: Negative  HENT: Negative  Eyes: Negative  Respiratory: Negative  Cardiovascular: Negative  Gastrointestinal: Negative  Endocrine: Negative  Genitourinary: Negative  Musculoskeletal: Negative  Skin: Negative  Allergic/Immunologic: Negative  Neurological: Negative  Hematological: Negative  Psychiatric/Behavioral: Negative  Objective:      /96 (BP Location: Right leg, Patient Position: Sitting, Cuff Size: Standard)   Pulse 72   Temp (!) 96 7 °F (35 9 °C)   Ht 6' (1 829 m)   Wt 113 kg (249 lb)   BMI 33 77 kg/m²          Physical Exam   Constitutional: He is oriented to person, place, and time  He appears well-developed and well-nourished  HENT:   Head: Normocephalic and atraumatic  Right Ear: External ear normal    Left Ear: External ear normal    Nose: Nose normal    Mouth/Throat: Oropharynx is clear and moist    Eyes: Pupils are equal, round, and reactive to light  Conjunctivae and EOM are normal    Neck: Normal range of motion  Neck supple  No thyromegaly present  Cardiovascular: Normal rate, regular rhythm, normal heart sounds and intact distal pulses      No murmur heard   Pulmonary/Chest: Effort normal and breath sounds normal    Abdominal: Soft  Bowel sounds are normal  He exhibits no distension and no mass  There is no tenderness  Musculoskeletal: Normal range of motion  He exhibits no edema or tenderness  Lymphadenopathy:     He has no cervical adenopathy  Neurological: He is alert and oriented to person, place, and time  No cranial nerve deficit  Coordination normal    Skin: Skin is warm and dry  Psychiatric: He has a normal mood and affect   His behavior is normal  Judgment and thought content normal

## 2018-09-28 NOTE — ASSESSMENT & PLAN NOTE
Blood pressure elevated today  Recheck shows blood pressure still be elevated  I will have him come back in 2 weeks  He will bring his home blood pressure monitor to check for accuracy  If still elevated, we will adjust medications    Check labs in the interim

## 2018-10-10 ENCOUNTER — CLINICAL SUPPORT (OUTPATIENT)
Dept: FAMILY MEDICINE CLINIC | Facility: CLINIC | Age: 63
End: 2018-10-10
Payer: COMMERCIAL

## 2018-10-10 VITALS — HEART RATE: 72 BPM | SYSTOLIC BLOOD PRESSURE: 172 MMHG | DIASTOLIC BLOOD PRESSURE: 100 MMHG

## 2018-10-10 DIAGNOSIS — I10 ESSENTIAL HYPERTENSION: Primary | ICD-10-CM

## 2018-10-10 PROCEDURE — 99211 OFF/OP EST MAY X REQ PHY/QHP: CPT

## 2018-10-10 RX ORDER — AMLODIPINE BESYLATE 5 MG/1
5 TABLET ORAL DAILY
Qty: 30 TABLET | Refills: 5 | Status: CANCELLED | OUTPATIENT
Start: 2018-10-10

## 2018-10-10 RX ORDER — AMLODIPINE BESYLATE 5 MG/1
5 TABLET ORAL DAILY
Qty: 30 TABLET | Refills: 5 | Status: SHIPPED | OUTPATIENT
Start: 2018-10-10 | End: 2019-04-08 | Stop reason: SDUPTHER

## 2018-10-10 NOTE — PROGRESS NOTES
Patient here for blood pressure recheck today, elevated at last visit  No symptoms  Dr Zeenat Knott is going to add Amlodipine 5 mg one tablet po daily  Recheck blood pressure with nurse in two weeks

## 2018-11-02 ENCOUNTER — CLINICAL SUPPORT (OUTPATIENT)
Dept: FAMILY MEDICINE CLINIC | Facility: CLINIC | Age: 63
End: 2018-11-02
Payer: MEDICARE

## 2018-11-02 VITALS — DIASTOLIC BLOOD PRESSURE: 82 MMHG | SYSTOLIC BLOOD PRESSURE: 132 MMHG

## 2018-11-02 DIAGNOSIS — I10 HYPERTENSION, UNSPECIFIED TYPE: Primary | ICD-10-CM

## 2018-11-02 PROCEDURE — 99211 OFF/OP EST MAY X REQ PHY/QHP: CPT

## 2018-11-02 NOTE — PROGRESS NOTES
Pt here today for BP check after starting Amlodipine 5 mg ,Pt feels good on this medication ,first reading was ok but left Pt sit for about 15 min and rechecked and is BP reading was WNL Pt to continue present TX  ,Pt to schedule F/U appoint with Dr Marina Thayer in about 4 months just to make sure BP is stable and to call if any problems prior to that appoint

## 2018-11-06 ENCOUNTER — APPOINTMENT (OUTPATIENT)
Dept: LAB | Facility: CLINIC | Age: 63
End: 2018-11-06
Payer: MEDICARE

## 2018-11-06 DIAGNOSIS — I10 HYPERTENSION, ESSENTIAL: ICD-10-CM

## 2018-11-06 DIAGNOSIS — C61 PROSTATE CANCER (HCC): ICD-10-CM

## 2018-11-06 LAB
ALBUMIN SERPL BCP-MCNC: 3.8 G/DL (ref 3.5–5)
ALP SERPL-CCNC: 83 U/L (ref 46–116)
ALT SERPL W P-5'-P-CCNC: 29 U/L (ref 12–78)
ANION GAP SERPL CALCULATED.3IONS-SCNC: 3 MMOL/L (ref 4–13)
AST SERPL W P-5'-P-CCNC: 24 U/L (ref 5–45)
BASOPHILS # BLD AUTO: 0.07 THOUSANDS/ΜL (ref 0–0.1)
BASOPHILS NFR BLD AUTO: 1 % (ref 0–1)
BILIRUB SERPL-MCNC: 0.7 MG/DL (ref 0.2–1)
BUN SERPL-MCNC: 16 MG/DL (ref 5–25)
CALCIUM SERPL-MCNC: 8.8 MG/DL (ref 8.3–10.1)
CHLORIDE SERPL-SCNC: 104 MMOL/L (ref 100–108)
CHOLEST SERPL-MCNC: 185 MG/DL (ref 50–200)
CO2 SERPL-SCNC: 27 MMOL/L (ref 21–32)
CREAT SERPL-MCNC: 0.99 MG/DL (ref 0.6–1.3)
EOSINOPHIL # BLD AUTO: 0.23 THOUSAND/ΜL (ref 0–0.61)
EOSINOPHIL NFR BLD AUTO: 3 % (ref 0–6)
ERYTHROCYTE [DISTWIDTH] IN BLOOD BY AUTOMATED COUNT: 12.9 % (ref 11.6–15.1)
GFR SERPL CREATININE-BSD FRML MDRD: 80 ML/MIN/1.73SQ M
GLUCOSE P FAST SERPL-MCNC: 95 MG/DL (ref 65–99)
HCT VFR BLD AUTO: 49.5 % (ref 36.5–49.3)
HDLC SERPL-MCNC: 30 MG/DL (ref 40–60)
HGB BLD-MCNC: 16.2 G/DL (ref 12–17)
IMM GRANULOCYTES # BLD AUTO: 0.03 THOUSAND/UL (ref 0–0.2)
IMM GRANULOCYTES NFR BLD AUTO: 0 % (ref 0–2)
LDLC SERPL CALC-MCNC: 126 MG/DL (ref 0–100)
LYMPHOCYTES # BLD AUTO: 1.48 THOUSANDS/ΜL (ref 0.6–4.47)
LYMPHOCYTES NFR BLD AUTO: 20 % (ref 14–44)
MCH RBC QN AUTO: 29.3 PG (ref 26.8–34.3)
MCHC RBC AUTO-ENTMCNC: 32.7 G/DL (ref 31.4–37.4)
MCV RBC AUTO: 90 FL (ref 82–98)
MONOCYTES # BLD AUTO: 0.74 THOUSAND/ΜL (ref 0.17–1.22)
MONOCYTES NFR BLD AUTO: 10 % (ref 4–12)
NEUTROPHILS # BLD AUTO: 4.88 THOUSANDS/ΜL (ref 1.85–7.62)
NEUTS SEG NFR BLD AUTO: 66 % (ref 43–75)
NONHDLC SERPL-MCNC: 155 MG/DL
NRBC BLD AUTO-RTO: 0 /100 WBCS
PLATELET # BLD AUTO: 231 THOUSANDS/UL (ref 149–390)
PMV BLD AUTO: 10.6 FL (ref 8.9–12.7)
POTASSIUM SERPL-SCNC: 3.9 MMOL/L (ref 3.5–5.3)
PROT SERPL-MCNC: 7 G/DL (ref 6.4–8.2)
PSA SERPL-MCNC: 1.6 NG/ML (ref 0–4)
RBC # BLD AUTO: 5.52 MILLION/UL (ref 3.88–5.62)
SODIUM SERPL-SCNC: 134 MMOL/L (ref 136–145)
TRIGL SERPL-MCNC: 147 MG/DL
WBC # BLD AUTO: 7.43 THOUSAND/UL (ref 4.31–10.16)

## 2018-11-06 PROCEDURE — G0103 PSA SCREENING: HCPCS

## 2018-11-06 PROCEDURE — 85025 COMPLETE CBC W/AUTO DIFF WBC: CPT

## 2018-11-06 PROCEDURE — 80061 LIPID PANEL: CPT

## 2018-11-06 PROCEDURE — 80053 COMPREHEN METABOLIC PANEL: CPT

## 2018-11-06 PROCEDURE — 36415 COLL VENOUS BLD VENIPUNCTURE: CPT

## 2019-03-07 ENCOUNTER — TELEPHONE (OUTPATIENT)
Dept: FAMILY MEDICINE CLINIC | Facility: CLINIC | Age: 64
End: 2019-03-07

## 2019-03-07 DIAGNOSIS — E78.2 MIXED HYPERLIPIDEMIA: ICD-10-CM

## 2019-03-07 RX ORDER — FENOFIBRATE 48 MG/1
48 TABLET, COATED ORAL DAILY
Qty: 90 TABLET | Refills: 3 | Status: CANCELLED | OUTPATIENT
Start: 2019-03-07 | End: 2020-03-01

## 2019-03-07 RX ORDER — FENOFIBRATE 48 MG/1
48 TABLET, COATED ORAL DAILY
Qty: 90 TABLET | Refills: 3 | Status: SHIPPED | OUTPATIENT
Start: 2019-03-07 | End: 2020-02-26 | Stop reason: SDUPTHER

## 2019-03-07 NOTE — TELEPHONE ENCOUNTER
Needs refill,  Fenofibrate 48 mg 1 qd  [facility] 248  He has 4 pills left, will ck with pharm brayden or next day if no cb

## 2019-04-08 DIAGNOSIS — I10 ESSENTIAL HYPERTENSION: ICD-10-CM

## 2019-04-08 RX ORDER — AMLODIPINE BESYLATE 5 MG/1
5 TABLET ORAL DAILY
Qty: 30 TABLET | Refills: 5 | Status: SHIPPED | OUTPATIENT
Start: 2019-04-08 | End: 2019-10-07 | Stop reason: SDUPTHER

## 2019-09-18 ENCOUNTER — IMMUNIZATIONS (OUTPATIENT)
Dept: FAMILY MEDICINE CLINIC | Facility: CLINIC | Age: 64
End: 2019-09-18
Payer: MEDICARE

## 2019-09-18 DIAGNOSIS — Z23 ENCOUNTER FOR IMMUNIZATION: ICD-10-CM

## 2019-09-19 PROCEDURE — 90662 IIV NO PRSV INCREASED AG IM: CPT | Performed by: FAMILY MEDICINE

## 2019-09-19 PROCEDURE — G0008 ADMIN INFLUENZA VIRUS VAC: HCPCS | Performed by: FAMILY MEDICINE

## 2019-10-07 DIAGNOSIS — I10 ESSENTIAL HYPERTENSION: ICD-10-CM

## 2019-10-07 RX ORDER — AMLODIPINE BESYLATE 5 MG/1
5 TABLET ORAL DAILY
Qty: 90 TABLET | Refills: 3 | Status: SHIPPED | OUTPATIENT
Start: 2019-10-07 | End: 2020-10-02 | Stop reason: SDUPTHER

## 2020-02-26 ENCOUNTER — OFFICE VISIT (OUTPATIENT)
Dept: FAMILY MEDICINE CLINIC | Facility: CLINIC | Age: 65
End: 2020-02-26
Payer: MEDICARE

## 2020-02-26 VITALS
WEIGHT: 242.8 LBS | HEIGHT: 72 IN | TEMPERATURE: 98.6 F | HEART RATE: 80 BPM | SYSTOLIC BLOOD PRESSURE: 130 MMHG | BODY MASS INDEX: 32.89 KG/M2 | DIASTOLIC BLOOD PRESSURE: 80 MMHG

## 2020-02-26 DIAGNOSIS — Z11.59 NEED FOR HEPATITIS C SCREENING TEST: ICD-10-CM

## 2020-02-26 DIAGNOSIS — I10 HYPERTENSION, ESSENTIAL: ICD-10-CM

## 2020-02-26 DIAGNOSIS — Z00.00 MEDICARE ANNUAL WELLNESS VISIT, INITIAL: Primary | ICD-10-CM

## 2020-02-26 DIAGNOSIS — E78.2 MIXED HYPERLIPIDEMIA: ICD-10-CM

## 2020-02-26 DIAGNOSIS — Z12.12 SCREENING FOR COLORECTAL CANCER: ICD-10-CM

## 2020-02-26 DIAGNOSIS — Z12.11 SCREENING FOR COLORECTAL CANCER: ICD-10-CM

## 2020-02-26 PROBLEM — C61 PROSTATE CANCER (HCC): Status: ACTIVE | Noted: 2020-02-26

## 2020-02-26 PROBLEM — C61 PROSTATE CANCER (HCC): Status: RESOLVED | Noted: 2020-02-26 | Resolved: 2020-02-26

## 2020-02-26 PROCEDURE — 4040F PNEUMOC VAC/ADMIN/RCVD: CPT | Performed by: FAMILY MEDICINE

## 2020-02-26 PROCEDURE — G0438 PPPS, INITIAL VISIT: HCPCS | Performed by: FAMILY MEDICINE

## 2020-02-26 PROCEDURE — 1170F FXNL STATUS ASSESSED: CPT | Performed by: FAMILY MEDICINE

## 2020-02-26 PROCEDURE — 1160F RVW MEDS BY RX/DR IN RCRD: CPT | Performed by: FAMILY MEDICINE

## 2020-02-26 PROCEDURE — 3079F DIAST BP 80-89 MM HG: CPT | Performed by: FAMILY MEDICINE

## 2020-02-26 PROCEDURE — 99214 OFFICE O/P EST MOD 30 MIN: CPT | Performed by: FAMILY MEDICINE

## 2020-02-26 PROCEDURE — 3008F BODY MASS INDEX DOCD: CPT | Performed by: FAMILY MEDICINE

## 2020-02-26 PROCEDURE — 1125F AMNT PAIN NOTED PAIN PRSNT: CPT | Performed by: FAMILY MEDICINE

## 2020-02-26 PROCEDURE — 3075F SYST BP GE 130 - 139MM HG: CPT | Performed by: FAMILY MEDICINE

## 2020-02-26 RX ORDER — FENOFIBRATE 48 MG/1
48 TABLET, COATED ORAL DAILY
Qty: 90 TABLET | Refills: 3 | Status: SHIPPED | OUTPATIENT
Start: 2020-02-26 | End: 2021-03-02 | Stop reason: SDUPTHER

## 2020-02-26 NOTE — PROGRESS NOTES
Assessment/Plan:    Hypertension, essential  Well controlled  Cont present treatment  Monitor labs  Recheck 6m      Hyperlipidemia  Check labs  Cont Tricor urged diet control  Recheck 6m       Diagnoses and all orders for this visit:    Medicare annual wellness visit, initial    Hypertension, essential    Mixed hyperlipidemia  -     fenofibrate (TRICOR) 48 mg tablet; Take 1 tablet (48 mg total) by mouth daily    Need for hepatitis C screening test  -     Hepatitis C antibody; Future    Screening for colorectal cancer  -     Ambulatory referral to Colorectal Surgery; Future          Subjective:      Patient ID: Arden Trotter  is a 75 y o  male  f/u multiple med issues and AWV  - pt feels well  - tries to walk around 20-25mi a week  Has lost 10-15 lbs  Denies CP, palpitations, lightheadedness or other CV symptoms with or without exertion  - patient denies any GI complaints at present  Patient is due for repeat colonoscopy this year  - patient denies any  complaints  - no extremity issues  Due for repeat labs in May  - AWV done        The following portions of the patient's history were reviewed and updated as appropriate:   He  has a past medical history of Elevated cholesterol with high triglycerides  He   Patient Active Problem List    Diagnosis Date Noted    Hypertension, essential 03/11/2015    Hyperlipidemia 11/08/2012     He  has a past surgical history that includes Hernia repair (Left) and pr colonoscopy flx dx w/collj spec when pfrmd (N/A, 2/24/2017)  He  reports that he has never smoked  He does not have any smokeless tobacco history on file  He reports that he drinks alcohol  He reports that he does not use drugs    Current Outpatient Medications   Medication Sig Dispense Refill    amLODIPine (NORVASC) 5 mg tablet Take 1 tablet (5 mg total) by mouth daily 90 tablet 3    fenofibrate (TRICOR) 48 mg tablet Take 1 tablet (48 mg total) by mouth daily 90 tablet 3     No current facility-administered medications for this visit  He is allergic to penicillins       Review of Systems   Constitutional: Negative  HENT: Negative  Eyes: Negative  Respiratory: Negative  Cardiovascular: Negative  Gastrointestinal: Negative  Endocrine: Negative  Genitourinary: Negative  Musculoskeletal: Negative  Skin: Negative  Allergic/Immunologic: Negative  Neurological: Negative  Hematological: Negative  Psychiatric/Behavioral: Negative  Objective:      /80   Pulse 80   Temp 98 6 °F (37 °C)   Ht 6' (1 829 m)   Wt 110 kg (242 lb 12 8 oz)   BMI 32 93 kg/m²          Physical Exam   Constitutional: He is oriented to person, place, and time  He appears well-developed and well-nourished  HENT:   Head: Normocephalic and atraumatic  Right Ear: External ear normal    Left Ear: External ear normal    Nose: Nose normal    Mouth/Throat: Oropharynx is clear and moist    Eyes: Pupils are equal, round, and reactive to light  Conjunctivae and EOM are normal    Neck: Normal range of motion  Neck supple  No thyromegaly present  Cardiovascular: Normal rate, regular rhythm, normal heart sounds and intact distal pulses  No murmur heard  Pulmonary/Chest: Effort normal and breath sounds normal    Abdominal: Soft  Bowel sounds are normal  He exhibits no distension and no mass  There is no tenderness  Musculoskeletal: Normal range of motion  He exhibits no edema or tenderness  Lymphadenopathy:     He has no cervical adenopathy  Neurological: He is alert and oriented to person, place, and time  He displays normal reflexes  No cranial nerve deficit or sensory deficit  He exhibits normal muscle tone  Coordination normal    Skin: Skin is warm and dry  Capillary refill takes less than 2 seconds  Psychiatric: He has a normal mood and affect  His behavior is normal  Judgment and thought content normal    Vitals reviewed

## 2020-02-26 NOTE — PROGRESS NOTES
Assessment and Plan:     Problem List Items Addressed This Visit        Cardiovascular and Mediastinum    Hypertension, essential     Well controlled  Cont present treatment  Monitor labs  Recheck 6m              Other    Hyperlipidemia     Check labs  Cont Tricor urged diet control  Recheck 6m         Relevant Medications    fenofibrate (TRICOR) 48 mg tablet      Other Visit Diagnoses     Medicare annual wellness visit, initial    -  Primary    Need for hepatitis C screening test        Relevant Orders    Hepatitis C antibody    Screening for colorectal cancer        Relevant Orders    Ambulatory referral to Colorectal Surgery        BMI Counseling: Body mass index is 32 93 kg/m²  The BMI is above normal  Nutrition recommendations include decreasing portion sizes, moderation in carbohydrate intake, increasing intake of lean protein, reducing intake of saturated and trans fat and reducing intake of cholesterol  Exercise recommendations include exercising 3-5 times per week  Preventive health issues were discussed with patient, and age appropriate screening tests were ordered as noted in patient's After Visit Summary  Personalized health advice and appropriate referrals for health education or preventive services given if needed, as noted in patient's After Visit Summary       History of Present Illness:     Patient presents for Medicare Annual Wellness visit    Patient Care Team:  Chen Gant MD as PCP - Sonja Law MD as Endoscopist     Problem List:     Patient Active Problem List   Diagnosis    Hyperlipidemia    Hypertension, essential      Past Medical and Surgical History:     Past Medical History:   Diagnosis Date    Elevated cholesterol with high triglycerides      Past Surgical History:   Procedure Laterality Date    HERNIA REPAIR Left     inguinal    [place] COLONOSCOPY FLX DX W/COLLJ [facility] WHEN PFRMD N/A 2/24/2017    Procedure: COLONOSCOPY;  Surgeon: Rachel Lucero Dipesh Garcia MD;  Location[facility]; Service: Colorectal      Family History:     Family History   Problem Relation Age of Onset    Diabetes Mother     Ulcerative colitis Son     Stroke Father         SYNDROME       Social History:        Social History     Socioeconomic History    Marital status: /Civil Union     Spouse name: None    Number of children: None    Years of education: None    Highest education level: None   Occupational History    None   Social Needs    Financial resource strain: None    Food insecurity:     Worry: None     Inability: None    Transportation needs:     Medical: None     Non-medical: None   Tobacco Use    Smoking status: Never Smoker   Substance and Sexual Activity    Alcohol use: Yes     Comment: 1-2 per week; DENIED: HISTORY OF ALCOHOL USE AS PER ALL SCRIPTS    Drug use: No    Sexual activity: None   Lifestyle    Physical activity:     Days per week: None     Minutes per session: None    Stress: None   Relationships    Social connections:     Talks on phone: None     Gets together: None     Attends Spiritism service: None     Active member of club or organization: None     Attends meetings of clubs or organizations: None     Relationship status: None    Intimate partner violence:     Fear of current or ex partner: None     Emotionally abused: None     Physically abused: None     Forced sexual activity: None   Other Topics Concern    None   Social History Narrative    Denied: history of daily coffee consumption    Daily cola consumption    Denied: history of daily tea consumption       Medications and Allergies:     Current Outpatient Medications   Medication Sig Dispense Refill    amLODIPine (NORVASC) 5 mg tablet Take 1 tablet (5 mg total) by mouth daily 90 tablet 3    fenofibrate (TRICOR) 48 mg tablet Take 1 tablet (48 mg total) by mouth daily 90 tablet 3     No current facility-administered medications for this visit        Allergies   Allergen Reactions  Penicillins Rash      Immunizations:     Immunization History   Administered Date(s) Administered    Influenza Quadrivalent, 6-35 Months IM 11/07/2017    Influenza, high dose seasonal 0 5 mL 09/26/2018, 09/19/2019    Pneumococcal Conjugate 13-Valent 09/26/2018      Health Maintenance:         Topic Date Due    Hepatitis C Screening  1955    CRC Screening: Colonoscopy  02/24/2020         Topic Date Due    DTaP,Tdap,and Td Vaccines (1 - Tdap) 08/19/1964    Pneumococcal Vaccine: 65+ Years (2 of 2 - PPSV23) 09/26/2019      Medicare Health Risk Assessment:     /80   Pulse 80   Temp 98 6 °F (37 °C)   Ht 6' (1 829 m)   Wt 110 kg (242 lb 12 8 oz)   BMI 32 93 kg/m²      Jt Walters is here for his Subsequent Wellness visit  Health Risk Assessment:   Patient rates overall health as very good  Patient feels that their physical health rating is same  Eyesight was rated as same  Hearing was rated as same  Patient feels that their emotional and mental health rating is same  Pain experienced in the last 7 days has been none  Patient states that he has experienced no weight loss or gain in last 6 months  Depression Screening:   PHQ-2 Score: 0      Fall Risk Screening: In the past year, patient has experienced: no history of falling in past year      Home Safety:  Patient does not have trouble with stairs inside or outside of their home  Patient has working smoke alarms and has no working carbon monoxide detector  Home safety hazards include: none  Nutrition:   Current diet is Regular  Medications:   Patient is currently taking over-the-counter supplements  OTC medications include: see medication list  Patient is able to manage medications  Activities of Daily Living (ADLs)/Instrumental Activities of Daily Living (IADLs):   Walk and transfer into and out of bed and chair?: Yes  Dress and groom yourself?: Yes    Bathe or shower yourself?: Yes    Feed yourself?  Yes  Do your laundry/housekeeping?: Yes  Manage your money, pay your bills and track your expenses?: Yes  Make your own meals?: Yes    Do your own shopping?: Yes    Previous Hospitalizations:   Any hospitalizations or ED visits within the last 12 months?: No      Advance Care Planning:   Living will: Yes    Durable POA for healthcare: Yes    Advanced directive: Yes    Advanced directive counseling given: Yes      Cognitive Screening:   Provider or family/friend/caregiver concerned regarding cognition?: No    PREVENTIVE SCREENINGS      Cardiovascular Screening:    General: Screening Not Indicated and History Lipid Disorder      Diabetes Screening:     General: Risks and Benefits Discussed    Due for: Blood Glucose      Colorectal Cancer Screening:     General: Screening Current      Prostate Cancer Screening:    General: Risks and Benefits Discussed    Due for: PSA      Osteoporosis Screening:    General: Screening Not Indicated      Abdominal Aortic Aneurysm (AAA) Screening:    Risk factors include: age between 73-67 yo        Lung Cancer Screening:     General: Screening Not Indicated      Hepatitis C Screening:    General: Risks and Benefits Discussed and Screening Not Indicated    Hep C Screening Accepted: Yes      Other Counseling Topics:   Regular weightbearing exercise and calcium and vitamin D intake         Chao Hitchcock MD

## 2020-02-26 NOTE — PATIENT INSTRUCTIONS
Medicare Preventive Visit Patient Instructions  Thank you for completing your Welcome to Medicare Visit or Medicare Annual Wellness Visit today  Your next wellness visit will be due in one year (2/26/2021)  The screening/preventive services that you may require over the next 5-10 years are detailed below  Some tests may not apply to you based off risk factors and/or age  Screening tests ordered at today's visit but not completed yet may show as past due  Also, please note that scanned in results may not display below  Preventive Screenings:  Service Recommendations Previous Testing/Comments   Colorectal Cancer Screening  · Colonoscopy    · Fecal Occult Blood Test (FOBT)/Fecal Immunochemical Test (FIT)  · Fecal DNA/Cologuard Test  · Flexible Sigmoidoscopy Age: 54-65 years old   Colonoscopy: every 10 years (May be performed more frequently if at higher risk)  OR  FOBT/FIT: every 1 year  OR  Cologuard: every 3 years  OR  Sigmoidoscopy: every 5 years  Screening may be recommended earlier than age 48 if at higher risk for colorectal cancer  Also, an individualized decision between you and your healthcare provider will decide whether screening between the ages of 74-80 would be appropriate  Colonoscopy: 02/24/2017  FOBT/FIT: Not on file  Cologuard: Not on file  Sigmoidoscopy: Not on file    Screening Current     Prostate Cancer Screening Individualized decision between patient and health care provider in men between ages of 53-78   Medicare will cover every 12 months beginning on the day after your 50th birthday PSA: 1 6 ng/mL          Hepatitis C Screening Once for adults born between 1945 and 1965  More frequently in patients at high risk for Hepatitis C Hep C Antibody: Not on file       Diabetes Screening 1-2 times per year if you're at risk for diabetes or have pre-diabetes Fasting glucose: 95 mg/dL   A1C: No results in last 5 years       Cholesterol Screening Once every 5 years if you don't have a lipid disorder  May order more often based on risk factors  Lipid panel: 11/06/2018    Screening Not Indicated  History Lipid Disorder      Other Preventive Screenings Covered by Medicare:  1  Abdominal Aortic Aneurysm (AAA) Screening: covered once if your at risk  You're considered to be at risk if you have a family history of AAA or a male between the age of 73-68 who smoking at least 100 cigarettes in your lifetime  2  Lung Cancer Screening: covers low dose CT scan once per year if you meet all of the following conditions: (1) Age 50-69; (2) No signs or symptoms of lung cancer; (3) Current smoker or have quit smoking within the last 15 years; (4) You have a tobacco smoking history of at least 30 pack years (packs per day x number of years you smoked); (5) You get a written order from a healthcare provider  3  Glaucoma Screening: covered annually if you're considered high risk: (1) You have diabetes OR (2) Family history of glaucoma OR (3)  aged 48 and older OR (3)  American aged 72 and older  3  Osteoporosis Screening: covered every 2 years if you meet one of the following conditions: (1) Have a vertebral abnormality; (2) On glucocorticoid therapy for more than 3 months; (3) Have primary hyperparathyroidism; (4) On osteoporosis medications and need to assess response to drug therapy  5  HIV Screening: covered annually if you're between the age of 12-76  Also covered annually if you are younger than 13 and older than 72 with risk factors for HIV infection  For pregnant patients, it is covered up to 3 times per pregnancy      Immunizations:  Immunization Recommendations   Influenza Vaccine Annual influenza vaccination during flu season is recommended for all persons aged >= 6 months who do not have contraindications   Pneumococcal Vaccine (Prevnar and Pneumovax)  * Prevnar = PCV13  * Pneumovax = PPSV23 Adults 25-60 years old: 1-3 doses may be recommended based on certain risk factors  Adults 65+ years old: Prevnar (PCV13) vaccine recommended followed by Pneumovax (PPSV23) vaccine  If already received PPSV23 since turning 65, then PCV13 recommended at least one year after PPSV23 dose  Hepatitis B Vaccine 3 dose series if at intermediate or high risk (ex: diabetes, end stage renal disease, liver disease)   Tetanus (Td) Vaccine - COST NOT COVERED BY MEDICARE PART B Following completion of primary series, a booster dose should be given every 10 years to maintain immunity against tetanus  Td may also be given as tetanus wound prophylaxis  Tdap Vaccine - COST NOT COVERED BY MEDICARE PART B Recommended at least once for all adults  For pregnant patients, recommended with each pregnancy  Shingles Vaccine (Shingrix) - COST NOT COVERED BY MEDICARE PART B  2 shot series recommended in those aged 48 and above     Health Maintenance Due:      Topic Date Due    Hepatitis C Screening  1955    CRC Screening: Colonoscopy  02/24/2020     Immunizations Due:      Topic Date Due    DTaP,Tdap,and Td Vaccines (1 - Tdap) 08/19/1964    Pneumococcal Vaccine: 65+ Years (2 of 2 - PPSV23) 09/26/2019     Advance Directives   What are advance directives? Advance directives are legal documents that state your wishes and plans for medical care  These plans are made ahead of time in case you lose your ability to make decisions for yourself  Advance directives can apply to any medical decision, such as the treatments you want, and if you want to donate organs  What are the types of advance directives? There are many types of advance directives, and each state has rules about how to use them  You may choose a combination of any of the following:  · Living will: This is a written record of the treatment you want  You can also choose which treatments you do not want, which to limit, and which to stop at a certain time  This includes surgery, medicine, IV fluid, and tube feedings     · Durable power of  for healthcare [facility]): This is a written record that states who you want to make healthcare choices for you when you are unable to make them for yourself  This person, called a proxy, is usually a family member or a friend  You may choose more than 1 proxy  · Do not resuscitate (DNR) order:  A DNR order is used in case your heart stops beating or you stop breathing  It is a request not to have certain forms of treatment, such as CPR  A DNR order may be included in other types of advance directives  · Medical directive: This covers the care that you want if you are in a coma, near death, or unable to make decisions for yourself  You can list the treatments you want for each condition  Treatment may include pain medicine, surgery, blood transfusions, dialysis, IV or tube feedings, and a ventilator (breathing machine)  · Values history: This document has questions about your views, beliefs, and how you feel and think about life  This information can help others choose the care that you would choose  Why are advance directives important? An advance directive helps you control your care  Although spoken wishes may be used, it is better to have your wishes written down  Spoken wishes can be misunderstood, or not followed  Treatments may be given even if you do not want them  An advance directive may make it easier for your family to make difficult choices about your care  Weight Management   Why it is important to manage your weight:  Being overweight increases your risk of health conditions such as heart disease, high blood pressure, type 2 diabetes, and certain types of cancer  It can also increase your risk for osteoarthritis, sleep apnea, and other respiratory problems  Aim for a slow, steady weight loss  Even a small amount of weight loss can lower your risk of health problems  How to lose weight safely:  A safe and healthy way to lose weight is to eat fewer calories and get regular exercise   You can lose up about 1 pound a week by decreasing the number of calories you eat by 500 calories each day  Healthy meal plan for weight management:  A healthy meal plan includes a variety of foods, contains fewer calories, and helps you stay healthy  A healthy meal plan includes the following:  · Eat whole-grain foods more often  A healthy meal plan should contain fiber  Fiber is the part of grains, fruits, and vegetables that is not broken down by your body  Whole-grain foods are healthy and provide extra fiber in your diet  Some examples of whole-grain foods are whole-wheat breads and pastas, oatmeal, brown rice, and bulgur  · Eat a variety of vegetables every day  Include dark, leafy greens such as spinach, kale, kris greens, and mustard greens  Eat yellow and orange vegetables such as carrots, sweet potatoes, and winter squash  · Eat a variety of fruits every day  Choose fresh or canned fruit (canned in its own juice or light syrup) instead of juice  Fruit juice has very little or no fiber  · Eat low-fat dairy foods  Drink fat-free (skim) milk or 1% milk  Eat fat-free yogurt and low-fat cottage cheese  Try low-fat cheeses such as mozzarella and other reduced-fat cheeses  · Choose meat and other protein foods that are low in fat  Choose beans or other legumes such as split peas or lentils  Choose fish, skinless poultry (chicken or turkey), or lean cuts of red meat (beef or pork)  Before you cook meat or poultry, cut off any visible fat  · Use less fat and oil  Try baking foods instead of frying them  Add less fat, such as margarine, sour cream, regular salad dressing and mayonnaise to foods  Eat fewer high-fat foods  Some examples of high-fat foods include french fries, doughnuts, ice cream, and cakes  · Eat fewer sweets  Limit foods and drinks that are high in sugar  This includes candy, cookies, regular soda, and sweetened drinks  Exercise:  Exercise at least 30 minutes per day on most days of the week   Some examples of exercise include walking, biking, dancing, and swimming  You can also fit in more physical activity by taking the stairs instead of the elevator or parking farther away from stores  Ask your healthcare provider about the best exercise plan for you  © Copyright [employer] 2018 Information is for End User's use only and may not be sold, redistributed or otherwise used for commercial purposes  All illustrations and images included in CareNotes® are the copyrighted property of [employer]  or [facility] Preventive Visit Patient Instructions  Thank you for completing your Welcome to Medicare Visit or Medicare Annual Wellness Visit today  Your next wellness visit will be due in one year (2/26/2021)  The screening/preventive services that you may require over the next 5-10 years are detailed below  Some tests may not apply to you based off risk factors and/or age  Screening tests ordered at today's visit but not completed yet may show as past due  Also, please note that scanned in results may not display below  Preventive Screenings:  Service Recommendations Previous Testing/Comments   Colorectal Cancer Screening  · Colonoscopy    · Fecal Occult Blood Test (FOBT)/Fecal Immunochemical Test (FIT)  · Fecal DNA/Cologuard Test  · Flexible Sigmoidoscopy Age: 54-65 years old   Colonoscopy: every 10 years (May be performed more frequently if at higher risk)  OR  FOBT/FIT: every 1 year  OR  Cologuard: every 3 years  OR  Sigmoidoscopy: every 5 years  Screening may be recommended earlier than age 48 if at higher risk for colorectal cancer  Also, an individualized decision between you and your healthcare provider will decide whether screening between the ages of 74-80 would be appropriate   Colonoscopy: 02/24/2017  FOBT/FIT: Not on file  Cologuard: Not on file  Sigmoidoscopy: Not on file    Screening Current     Prostate Cancer Screening Individualized decision between patient and health care provider in men between ages of 53-78   Medicare will cover every 12 months beginning on the day after your 50th birthday PSA: 1 6 ng/mL          Hepatitis C Screening Once for adults born between 1945 and 1965  More frequently in patients at high risk for Hepatitis C Hep C Antibody: Not on file       Diabetes Screening 1-2 times per year if you're at risk for diabetes or have pre-diabetes Fasting glucose: 95 mg/dL   A1C: No results in last 5 years       Cholesterol Screening Once every 5 years if you don't have a lipid disorder  May order more often based on risk factors  Lipid panel: 11/06/2018    Screening Not Indicated  History Lipid Disorder      Other Preventive Screenings Covered by Medicare:  6  Abdominal Aortic Aneurysm (AAA) Screening: covered once if your at risk  You're considered to be at risk if you have a family history of AAA or a male between the age of 73-68 who smoking at least 100 cigarettes in your lifetime  7  Lung Cancer Screening: covers low dose CT scan once per year if you meet all of the following conditions: (1) Age 50-69; (2) No signs or symptoms of lung cancer; (3) Current smoker or have quit smoking within the last 15 years; (4) You have a tobacco smoking history of at least 30 pack years (packs per day x number of years you smoked); (5) You get a written order from a healthcare provider  8  Glaucoma Screening: covered annually if you're considered high risk: (1) You have diabetes OR (2) Family history of glaucoma OR (3)  aged 48 and older OR (3)  American aged 72 and older  5  Osteoporosis Screening: covered every 2 years if you meet one of the following conditions: (1) Have a vertebral abnormality; (2) On glucocorticoid therapy for more than 3 months; (3) Have primary hyperparathyroidism; (4) On osteoporosis medications and need to assess response to drug therapy  10  HIV Screening: covered annually if you're between the age of 12-76   Also covered annually if you are younger than 13 and older than 72 with risk factors for HIV infection  For pregnant patients, it is covered up to 3 times per pregnancy  Immunizations:  Immunization Recommendations   Influenza Vaccine Annual influenza vaccination during flu season is recommended for all persons aged >= 6 months who do not have contraindications   Pneumococcal Vaccine (Prevnar and Pneumovax)  * Prevnar = PCV13  * Pneumovax = PPSV23 Adults 25-60 years old: 1-3 doses may be recommended based on certain risk factors  Adults 72 years old: Prevnar (PCV13) vaccine recommended followed by Pneumovax (PPSV23) vaccine  If already received PPSV23 since turning 65, then PCV13 recommended at least one year after PPSV23 dose  Hepatitis B Vaccine 3 dose series if at intermediate or high risk (ex: diabetes, end stage renal disease, liver disease)   Tetanus (Td) Vaccine - COST NOT COVERED BY MEDICARE PART B Following completion of primary series, a booster dose should be given every 10 years to maintain immunity against tetanus  Td may also be given as tetanus wound prophylaxis  Tdap Vaccine - COST NOT COVERED BY MEDICARE PART B Recommended at least once for all adults  For pregnant patients, recommended with each pregnancy  Shingles Vaccine (Shingrix) - COST NOT COVERED BY MEDICARE PART B  2 shot series recommended in those aged 48 and above     Health Maintenance Due:      Topic Date Due    Hepatitis C Screening  1955    CRC Screening: Colonoscopy  02/24/2020     Immunizations Due:      Topic Date Due    DTaP,Tdap,and Td Vaccines (1 - Tdap) 08/19/1964    Pneumococcal Vaccine: 65+ Years (2 of 2 - PPSV23) 09/26/2019     Advance Directives   What are advance directives? Advance directives are legal documents that state your wishes and plans for medical care  These plans are made ahead of time in case you lose your ability to make decisions for yourself   Advance directives can apply to any medical decision, such as the treatments you want, and if you want to donate organs  What are the types of advance directives? There are many types of advance directives, and each state has rules about how to use them  You may choose a combination of any of the following:  · Living will: This is a written record of the treatment you want  You can also choose which treatments you do not want, which to limit, and which to stop at a certain time  This includes surgery, medicine, IV fluid, and tube feedings  · Durable power of  for healthcare [facility]): This is a written record that states who you want to make healthcare choices for you when you are unable to make them for yourself  This person, called a proxy, is usually a family member or a friend  You may choose more than 1 proxy  · Do not resuscitate (DNR) order:  A DNR order is used in case your heart stops beating or you stop breathing  It is a request not to have certain forms of treatment, such as CPR  A DNR order may be included in other types of advance directives  · Medical directive: This covers the care that you want if you are in a coma, near death, or unable to make decisions for yourself  You can list the treatments you want for each condition  Treatment may include pain medicine, surgery, blood transfusions, dialysis, IV or tube feedings, and a ventilator (breathing machine)  · Values history: This document has questions about your views, beliefs, and how you feel and think about life  This information can help others choose the care that you would choose  Why are advance directives important? An advance directive helps you control your care  Although spoken wishes may be used, it is better to have your wishes written down  Spoken wishes can be misunderstood, or not followed  Treatments may be given even if you do not want them  An advance directive may make it easier for your family to make difficult choices about your care     Weight Management   Why it is important to manage your weight:  Being overweight increases your risk of health conditions such as heart disease, high blood pressure, type 2 diabetes, and certain types of cancer  It can also increase your risk for osteoarthritis, sleep apnea, and other respiratory problems  Aim for a slow, steady weight loss  Even a small amount of weight loss can lower your risk of health problems  How to lose weight safely:  A safe and healthy way to lose weight is to eat fewer calories and get regular exercise  You can lose up about 1 pound a week by decreasing the number of calories you eat by 500 calories each day  Healthy meal plan for weight management:  A healthy meal plan includes a variety of foods, contains fewer calories, and helps you stay healthy  A healthy meal plan includes the following:  · Eat whole-grain foods more often  A healthy meal plan should contain fiber  Fiber is the part of grains, fruits, and vegetables that is not broken down by your body  Whole-grain foods are healthy and provide extra fiber in your diet  Some examples of whole-grain foods are whole-wheat breads and pastas, oatmeal, brown rice, and bulgur  · Eat a variety of vegetables every day  Include dark, leafy greens such as spinach, kale, kris greens, and mustard greens  Eat yellow and orange vegetables such as carrots, sweet potatoes, and winter squash  · Eat a variety of fruits every day  Choose fresh or canned fruit (canned in its own juice or light syrup) instead of juice  Fruit juice has very little or no fiber  · Eat low-fat dairy foods  Drink fat-free (skim) milk or 1% milk  Eat fat-free yogurt and low-fat cottage cheese  Try low-fat cheeses such as mozzarella and other reduced-fat cheeses  · Choose meat and other protein foods that are low in fat  Choose beans or other legumes such as split peas or lentils  Choose fish, skinless poultry (chicken or turkey), or lean cuts of red meat (beef or pork)   Before you cook meat or poultry, cut off any visible fat  · Use less fat and oil  Try baking foods instead of frying them  Add less fat, such as margarine, sour cream, regular salad dressing and mayonnaise to foods  Eat fewer high-fat foods  Some examples of high-fat foods include french fries, doughnuts, ice cream, and cakes  · Eat fewer sweets  Limit foods and drinks that are high in sugar  This includes candy, cookies, regular soda, and sweetened drinks  Exercise:  Exercise at least 30 minutes per day on most days of the week  Some examples of exercise include walking, biking, dancing, and swimming  You can also fit in more physical activity by taking the stairs instead of the elevator or parking farther away from stores  Ask your healthcare provider about the best exercise plan for you  © Copyright [facility] [facility] 2018 Information is for End User's use only and may not be sold, redistributed or otherwise used for commercial purposes   All illustrations and images included in CareNotes® are the copyrighted property of [employer]  or [address] [place] [address]

## 2020-09-24 ENCOUNTER — TELEPHONE (OUTPATIENT)
Dept: FAMILY MEDICINE CLINIC | Facility: CLINIC | Age: 65
End: 2020-09-24

## 2020-09-24 NOTE — TELEPHONE ENCOUNTER
OK Tdap - let them know that it may not be covered by their insurance   The earlier the better for the shot

## 2020-09-24 NOTE — TELEPHONE ENCOUNTER
Patient's wife called  Their daughter in law is pregnant and due Dec 4th  They need Tdap  Ok to schedule?  Also, she is wondering if they should wait to schedule closer to the due date

## 2020-09-25 ENCOUNTER — CLINICAL SUPPORT (OUTPATIENT)
Dept: FAMILY MEDICINE CLINIC | Facility: CLINIC | Age: 65
End: 2020-09-25
Payer: MEDICARE

## 2020-09-25 DIAGNOSIS — Z23 NEED FOR VACCINATION: Primary | ICD-10-CM

## 2020-09-25 PROCEDURE — 90471 IMMUNIZATION ADMIN: CPT | Performed by: FAMILY MEDICINE

## 2020-09-25 PROCEDURE — 90715 TDAP VACCINE 7 YRS/> IM: CPT | Performed by: FAMILY MEDICINE

## 2020-10-02 DIAGNOSIS — I10 ESSENTIAL HYPERTENSION: ICD-10-CM

## 2020-10-02 RX ORDER — AMLODIPINE BESYLATE 5 MG/1
5 TABLET ORAL DAILY
Qty: 90 TABLET | Refills: 3 | Status: SHIPPED | OUTPATIENT
Start: 2020-10-02 | End: 2021-09-22 | Stop reason: SDUPTHER

## 2021-02-13 DIAGNOSIS — Z23 ENCOUNTER FOR IMMUNIZATION: ICD-10-CM

## 2021-03-02 ENCOUNTER — OFFICE VISIT (OUTPATIENT)
Dept: FAMILY MEDICINE CLINIC | Facility: CLINIC | Age: 66
End: 2021-03-02
Payer: MEDICARE

## 2021-03-02 VITALS
HEIGHT: 72 IN | OXYGEN SATURATION: 95 % | SYSTOLIC BLOOD PRESSURE: 132 MMHG | DIASTOLIC BLOOD PRESSURE: 78 MMHG | WEIGHT: 220.4 LBS | BODY MASS INDEX: 29.85 KG/M2 | TEMPERATURE: 98.4 F | HEART RATE: 84 BPM

## 2021-03-02 DIAGNOSIS — Z00.00 MEDICARE ANNUAL WELLNESS VISIT, SUBSEQUENT: Primary | ICD-10-CM

## 2021-03-02 DIAGNOSIS — Z11.59 NEED FOR HEPATITIS C SCREENING TEST: ICD-10-CM

## 2021-03-02 DIAGNOSIS — I10 HYPERTENSION, ESSENTIAL: ICD-10-CM

## 2021-03-02 DIAGNOSIS — E78.2 MIXED HYPERLIPIDEMIA: ICD-10-CM

## 2021-03-02 DIAGNOSIS — Z13.6 SCREENING FOR CARDIOVASCULAR CONDITION: ICD-10-CM

## 2021-03-02 DIAGNOSIS — Z12.5 SCREENING FOR PROSTATE CANCER: ICD-10-CM

## 2021-03-02 PROCEDURE — 1123F ACP DISCUSS/DSCN MKR DOCD: CPT | Performed by: FAMILY MEDICINE

## 2021-03-02 PROCEDURE — G0439 PPPS, SUBSEQ VISIT: HCPCS | Performed by: FAMILY MEDICINE

## 2021-03-02 PROCEDURE — 99214 OFFICE O/P EST MOD 30 MIN: CPT | Performed by: FAMILY MEDICINE

## 2021-03-02 RX ORDER — FENOFIBRATE 48 MG/1
48 TABLET, COATED ORAL DAILY
Qty: 90 TABLET | Refills: 3 | Status: SHIPPED | OUTPATIENT
Start: 2021-03-02 | End: 2022-03-02 | Stop reason: SDUPTHER

## 2021-03-02 NOTE — PROGRESS NOTES
Assessment and Plan:     Problem List Items Addressed This Visit        Cardiovascular and Mediastinum    Hypertension, essential    Relevant Orders    CBC and differential    Comprehensive metabolic panel    Lipid panel       Other    Hyperlipidemia      Other Visit Diagnoses     Medicare annual wellness visit, subsequent    -  Primary    Screening for cardiovascular condition        Screening for prostate cancer        Relevant Orders    PSA, Total Screen           Preventive health issues were discussed with patient, and age appropriate screening tests were ordered as noted in patient's After Visit Summary  Personalized health advice and appropriate referrals for health education or preventive services given if needed, as noted in patient's After Visit Summary  History of Present Illness:     Patient presents for Medicare Annual Wellness visit    Patient Care Team:  Yasmin Pinto MD as PCP - Aguila Nielsen MD as Endoscopist     Problem List:     Patient Active Problem List   Diagnosis    Hyperlipidemia    Hypertension, essential      Past Medical and Surgical History:     Past Medical History:   Diagnosis Date    Elevated cholesterol with high triglycerides      Past Surgical History:   Procedure Laterality Date    HERNIA REPAIR Left     inguinal    MN COLONOSCOPY FLX DX W/COLLJ SPEC WHEN PFRMD N/A 2/24/2017    Procedure: COLONOSCOPY;  Surgeon: Tricia Spivey MD;  Location: [facility];   Service: Colorectal      Family History:     Family History   Problem Relation Age of Onset    Diabetes Mother     Ulcerative colitis Son     Stroke Father         SYNDROME       Social History:        Social History     Socioeconomic History    Marital status: /Civil Union     Spouse name: Not on file    Number of children: Not on file    Years of education: Not on file    Highest education level: Not on file   Occupational History    Not on file   Social Needs    Financial resource strain: Not on file    Food insecurity     Worry: Not on file     Inability: Not on file    Transportation needs     Medical: Not on file     Non-medical: Not on file   Tobacco Use    Smoking status: Never Smoker   Substance and Sexual Activity    Alcohol use: Yes     Comment: 1-2 per week; DENIED: HISTORY OF ALCOHOL USE AS PER ALL SCRIPTS    Drug use: No    Sexual activity: Not on file   Lifestyle    Physical activity     Days per week: Not on file     Minutes per session: Not on file    Stress: Not on file   Relationships    Social connections     Talks on phone: Not on file     Gets together: Not on file     Attends Sabianist service: Not on file     Active member of club or organization: Not on file     Attends meetings of clubs or organizations: Not on file     Relationship status: Not on file    Intimate partner violence     Fear of current or ex partner: Not on file     Emotionally abused: Not on file     Physically abused: Not on file     Forced sexual activity: Not on file   Other Topics Concern    Not on file   Social History Narrative    Denied: history of daily coffee consumption    Daily cola consumption    Denied: history of daily tea consumption       Medications and Allergies:     Current Outpatient Medications   Medication Sig Dispense Refill    amLODIPine (NORVASC) 5 mg tablet Take 1 tablet (5 mg total) by mouth daily 90 tablet 3    fenofibrate (TRICOR) 48 mg tablet Take 1 tablet (48 mg total) by mouth daily 90 tablet 3     No current facility-administered medications for this visit        Allergies   Allergen Reactions    Penicillins Rash      Immunizations:     Immunization History   Administered Date(s) Administered    Influenza Quadrivalent, 6-35 Months IM 11/07/2017    Influenza, high dose seasonal 0 7 mL 09/26/2018, 09/19/2019    Pneumococcal Conjugate 13-Valent 09/26/2018    Tdap 09/25/2020      Health Maintenance:         Topic Date Due    Hepatitis C Screening 1955    Colonoscopy Surveillance  02/24/2020    Colorectal Cancer Screening  02/24/2027         Topic Date Due    Pneumococcal Vaccine: 65+ Years (2 of 2 - PPSV23) 09/26/2019    Influenza Vaccine (1) 09/01/2020      Medicare Health Risk Assessment:     /78   Pulse 84   Temp 98 4 °F (36 9 °C)   Ht 6' (1 829 m)   Wt 100 kg (220 lb 6 4 oz)   SpO2 95%   BMI 29 89 kg/m²          Health Risk Assessment:   Patient rates overall health as very good  Patient feels that their physical health rating is much better  Eyesight was rated as slightly worse  Hearing was rated as same  Patient feels that their emotional and mental health rating is slightly better  Pain experienced in the last 7 days has been none  Patient states that he has experienced no weight loss or gain in last 6 months  Depression Screening:   PHQ-2 Score: 0      Fall Risk Screening: In the past year, patient has experienced: no history of falling in past year      Home Safety:  Patient does not have trouble with stairs inside or outside of their home  Patient has working smoke alarms and has working carbon monoxide detector  Home safety hazards include: none  Nutrition:   Current diet is Regular  Medications:   Patient is currently taking over-the-counter supplements  OTC medications include: see medication list  Patient is able to manage medications  Activities of Daily Living (ADLs)/Instrumental Activities of Daily Living (IADLs):   Walk and transfer into and out of bed and chair?: Yes  Dress and groom yourself?: Yes    Bathe or shower yourself?: Yes    Feed yourself? Yes  Do your laundry/housekeeping?: Yes  Manage your money, pay your bills and track your expenses?: Yes  Make your own meals?: Yes    Do your own shopping?: Yes    Previous Hospitalizations:   Any hospitalizations or ED visits within the last 12 months?: No      Advance Care Planning:   Living will: Yes    Durable POA for healthcare:  Yes    Advanced directive: Yes      Cognitive Screening:   Provider or family/friend/caregiver concerned regarding cognition?: No    PREVENTIVE SCREENINGS      Cardiovascular Screening:    General: Screening Not Indicated and History Lipid Disorder      Diabetes Screening:     General: Screening Not Indicated      Colorectal Cancer Screening:     General: Screening Current      Prostate Cancer Screening:    General: Risks and Benefits Discussed    Due for: PSA      Osteoporosis Screening:    General: Screening Not Indicated      Abdominal Aortic Aneurysm (AAA) Screening:    Risk factors include: age between 73-69 yo        Lung Cancer Screening:     General: Screening Not Indicated      Hepatitis C Screening:    General: Risks and Benefits Discussed    Hep C Screening Accepted: Yes      Other Counseling Topics:   Calcium and vitamin D intake and regular weightbearing exercise         Ash Powell MD

## 2021-03-02 NOTE — PATIENT INSTRUCTIONS
Medicare Preventive Visit Patient Instructions  Thank you for completing your Welcome to Medicare Visit or Medicare Annual Wellness Visit today  Your next wellness visit will be due in one year (3/2/2022)  The screening/preventive services that you may require over the next 5-10 years are detailed below  Some tests may not apply to you based off risk factors and/or age  Screening tests ordered at today's visit but not completed yet may show as past due  Also, please note that scanned in results may not display below  Preventive Screenings:  Service Recommendations Previous Testing/Comments   Colorectal Cancer Screening  · Colonoscopy    · Fecal Occult Blood Test (FOBT)/Fecal Immunochemical Test (FIT)  · Fecal DNA/Cologuard Test  · Flexible Sigmoidoscopy Age: 54-65 years old   Colonoscopy: every 10 years (May be performed more frequently if at higher risk)  OR  FOBT/FIT: every 1 year  OR  Cologuard: every 3 years  OR  Sigmoidoscopy: every 5 years  Screening may be recommended earlier than age 48 if at higher risk for colorectal cancer  Also, an individualized decision between you and your healthcare provider will decide whether screening between the ages of 74-80 would be appropriate  Colonoscopy: 02/24/2017  FOBT/FIT: Not on file  Cologuard: Not on file  Sigmoidoscopy: Not on file    Screening Current     Prostate Cancer Screening Individualized decision between patient and health care provider in men between ages of 53-78   Medicare will cover every 12 months beginning on the day after your 50th birthday PSA: 1 6 ng/mL          Hepatitis C Screening Once for adults born between 1945 and 1965  More frequently in patients at high risk for Hepatitis C Hep C Antibody: Not on file       Diabetes Screening 1-2 times per year if you're at risk for diabetes or have pre-diabetes Fasting glucose: 95 mg/dL   A1C: No results in last 5 years       Cholesterol Screening Once every 5 years if you don't have a lipid disorder  May order more often based on risk factors  Lipid panel: 11/06/2018    Screening Not Indicated  History Lipid Disorder      Other Preventive Screenings Covered by Medicare:  1  Abdominal Aortic Aneurysm (AAA) Screening: covered once if your at risk  You're considered to be at risk if you have a family history of AAA or a male between the age of 73-68 who smoking at least 100 cigarettes in your lifetime  2  Lung Cancer Screening: covers low dose CT scan once per year if you meet all of the following conditions: (1) Age 50-69; (2) No signs or symptoms of lung cancer; (3) Current smoker or have quit smoking within the last 15 years; (4) You have a tobacco smoking history of at least 30 pack years (packs per day x number of years you smoked); (5) You get a written order from a healthcare provider  3  Glaucoma Screening: covered annually if you're considered high risk: (1) You have diabetes OR (2) Family history of glaucoma OR (3)  aged 48 and older OR (3)  American aged 72 and older  3  Osteoporosis Screening: covered every 2 years if you meet one of the following conditions: (1) Have a vertebral abnormality; (2) On glucocorticoid therapy for more than 3 months; (3) Have primary hyperparathyroidism; (4) On osteoporosis medications and need to assess response to drug therapy  5  HIV Screening: covered annually if you're between the age of 12-76  Also covered annually if you are younger than 13 and older than 72 with risk factors for HIV infection  For pregnant patients, it is covered up to 3 times per pregnancy      Immunizations:  Immunization Recommendations   Influenza Vaccine Annual influenza vaccination during flu season is recommended for all persons aged >= 6 months who do not have contraindications   Pneumococcal Vaccine (Prevnar and Pneumovax)  * Prevnar = PCV13  * Pneumovax = PPSV23 Adults 25-60 years old: 1-3 doses may be recommended based on certain risk factors  Adults 65+ years old: Prevnar (PCV13) vaccine recommended followed by Pneumovax (PPSV23) vaccine  If already received PPSV23 since turning 65, then PCV13 recommended at least one year after PPSV23 dose  Hepatitis B Vaccine 3 dose series if at intermediate or high risk (ex: diabetes, end stage renal disease, liver disease)   Tetanus (Td) Vaccine - COST NOT COVERED BY MEDICARE PART B Following completion of primary series, a booster dose should be given every 10 years to maintain immunity against tetanus  Td may also be given as tetanus wound prophylaxis  Tdap Vaccine - COST NOT COVERED BY MEDICARE PART B Recommended at least once for all adults  For pregnant patients, recommended with each pregnancy  Shingles Vaccine (Shingrix) - COST NOT COVERED BY MEDICARE PART B  2 shot series recommended in those aged 48 and above     Health Maintenance Due:      Topic Date Due    Hepatitis C Screening  1955    Colonoscopy Surveillance  02/24/2020    Colorectal Cancer Screening  02/24/2027     Immunizations Due:      Topic Date Due    Pneumococcal Vaccine: 65+ Years (2 of 2 - PPSV23) 09/26/2019    Influenza Vaccine (1) 09/01/2020     Advance Directives   What are advance directives? Advance directives are legal documents that state your wishes and plans for medical care  These plans are made ahead of time in case you lose your ability to make decisions for yourself  Advance directives can apply to any medical decision, such as the treatments you want, and if you want to donate organs  What are the types of advance directives? There are many types of advance directives, and each state has rules about how to use them  You may choose a combination of any of the following:  · Living will: This is a written record of the treatment you want  You can also choose which treatments you do not want, which to limit, and which to stop at a certain time  This includes surgery, medicine, IV fluid, and tube feedings     · Durable power of  for [facility]): This is a written record that states who you want to make healthcare choices for you when you are unable to make them for yourself  This person, called a proxy, is usually a family member or a friend  You may choose more than 1 proxy  · Do not resuscitate (DNR) order:  A DNR order is used in case your heart stops beating or you stop breathing  It is a request not to have certain forms of treatment, such as CPR  A DNR order may be included in other types of advance directives  · Medical directive: This covers the care that you want if you are in a coma, near death, or unable to make decisions for yourself  You can list the treatments you want for each condition  Treatment may include pain medicine, surgery, blood transfusions, dialysis, IV or tube feedings, and a ventilator (breathing machine)  · Values history: This document has questions about your views, beliefs, and how you feel and think about life  This information can help others choose the care that you would choose  Why are advance directives important? An advance directive helps you control your care  Although spoken wishes may be used, it is better to have your wishes written down  Spoken wishes can be misunderstood, or not followed  Treatments may be given even if you do not want them  An advance directive may make it easier for your family to make difficult choices about your care  Weight Management   Why it is important to manage your weight:  Being overweight increases your risk of health conditions such as heart disease, high blood pressure, type 2 diabetes, and certain types of cancer  It can also increase your risk for osteoarthritis, sleep apnea, and other respiratory problems  Aim for a slow, steady weight loss  Even a small amount of weight loss can lower your risk of health problems  How to lose weight safely:  A safe and healthy way to lose weight is to eat fewer calories and get regular exercise   You can lose up about 1 pound a week by decreasing the number of calories you eat by 500 calories each day  Healthy meal plan for weight management:  A healthy meal plan includes a variety of foods, contains fewer calories, and helps you stay healthy  A healthy meal plan includes the following:  · Eat whole-grain foods more often  A healthy meal plan should contain fiber  Fiber is the part of grains, fruits, and vegetables that is not broken down by your body  Whole-grain foods are healthy and provide extra fiber in your diet  Some examples of whole-grain foods are whole-wheat breads and pastas, oatmeal, brown rice, and bulgur  · Eat a variety of vegetables every day  Include dark, leafy greens such as spinach, kale, kris greens, and mustard greens  Eat yellow and orange vegetables such as carrots, sweet potatoes, and winter squash  · Eat a variety of fruits every day  Choose fresh or canned fruit (canned in its own juice or light syrup) instead of juice  Fruit juice has very little or no fiber  · Eat low-fat dairy foods  Drink fat-free (skim) milk or 1% milk  Eat fat-free yogurt and low-fat cottage cheese  Try low-fat cheeses such as mozzarella and other reduced-fat cheeses  · Choose meat and other protein foods that are low in fat  Choose beans or other legumes such as split peas or lentils  Choose fish, skinless poultry (chicken or turkey), or lean cuts of red meat (beef or pork)  Before you cook meat or poultry, cut off any visible fat  · Use less fat and oil  Try baking foods instead of frying them  Add less fat, such as margarine, sour cream, regular salad dressing and mayonnaise to foods  Eat fewer high-fat foods  Some examples of high-fat foods include french fries, doughnuts, ice cream, and cakes  · Eat fewer sweets  Limit foods and drinks that are high in sugar  This includes candy, cookies, regular soda, and sweetened drinks    Exercise:  Exercise at least 30 minutes per day on most days of the week  Some examples of exercise include walking, biking, dancing, and swimming  You can also fit in more physical activity by taking the stairs instead of the elevator or parking farther away from stores  Ask your healthcare provider about the best exercise plan for you  © Copyright [employer] 2018 Information is for End User's use only and may not be sold, redistributed or otherwise used for commercial purposes  All illustrations and images included in CareNotes® are the copyrighted property of [employer]  or [facility] Preventive Visit Patient Instructions  Thank you for completing your Welcome to Medicare Visit or Medicare Annual Wellness Visit today  Your next wellness visit will be due in one year (3/2/2022)  The screening/preventive services that you may require over the next 5-10 years are detailed below  Some tests may not apply to you based off risk factors and/or age  Screening tests ordered at today's visit but not completed yet may show as past due  Also, please note that scanned in results may not display below  Preventive Screenings:  Service Recommendations Previous Testing/Comments   Colorectal Cancer Screening  · Colonoscopy    · Fecal Occult Blood Test (FOBT)/Fecal Immunochemical Test (FIT)  · Fecal DNA/Cologuard Test  · Flexible Sigmoidoscopy Age: 54-65 years old   Colonoscopy: every 10 years (May be performed more frequently if at higher risk)  OR  FOBT/FIT: every 1 year  OR  Cologuard: every 3 years  OR  Sigmoidoscopy: every 5 years  Screening may be recommended earlier than age 48 if at higher risk for colorectal cancer  Also, an individualized decision between you and your healthcare provider will decide whether screening between the ages of 74-80 would be appropriate   Colonoscopy: 02/24/2017  FOBT/FIT: Not on file  Cologuard: Not on file  Sigmoidoscopy: Not on file    Screening Current     Prostate Cancer Screening Individualized decision between patient and health care provider in men between ages of 53-78   Medicare will cover every 12 months beginning on the day after your 50th birthday PSA: 1 6 ng/mL          Hepatitis C Screening Once for adults born between 1945 and 1965  More frequently in patients at high risk for Hepatitis C Hep C Antibody: Not on file       Diabetes Screening 1-2 times per year if you're at risk for diabetes or have pre-diabetes Fasting glucose: 95 mg/dL   A1C: No results in last 5 years       Cholesterol Screening Once every 5 years if you don't have a lipid disorder  May order more often based on risk factors  Lipid panel: 11/06/2018    Screening Not Indicated  History Lipid Disorder      Other Preventive Screenings Covered by Medicare:  6  Abdominal Aortic Aneurysm (AAA) Screening: covered once if your at risk  You're considered to be at risk if you have a family history of AAA or a male between the age of 73-68 who smoking at least 100 cigarettes in your lifetime  7  Lung Cancer Screening: covers low dose CT scan once per year if you meet all of the following conditions: (1) Age 50-69; (2) No signs or symptoms of lung cancer; (3) Current smoker or have quit smoking within the last 15 years; (4) You have a tobacco smoking history of at least 30 pack years (packs per day x number of years you smoked); (5) You get a written order from a healthcare provider  8  Glaucoma Screening: covered annually if you're considered high risk: (1) You have diabetes OR (2) Family history of glaucoma OR (3)  aged 48 and older OR (3)  American aged 72 and older  5  Osteoporosis Screening: covered every 2 years if you meet one of the following conditions: (1) Have a vertebral abnormality; (2) On glucocorticoid therapy for more than 3 months; (3) Have primary hyperparathyroidism; (4) On osteoporosis medications and need to assess response to drug therapy  10  HIV Screening: covered annually if you're between the age of 12-76   Also covered annually if you are younger than 13 and older than 72 with risk factors for HIV infection  For pregnant patients, it is covered up to 3 times per pregnancy  Immunizations:  Immunization Recommendations   Influenza Vaccine Annual influenza vaccination during flu season is recommended for all persons aged >= 6 months who do not have contraindications   Pneumococcal Vaccine (Prevnar and Pneumovax)  * Prevnar = PCV13  * Pneumovax = PPSV23 Adults 25-60 years old: 1-3 doses may be recommended based on certain risk factors  Adults 72 years old: Prevnar (PCV13) vaccine recommended followed by Pneumovax (PPSV23) vaccine  If already received PPSV23 since turning 65, then PCV13 recommended at least one year after PPSV23 dose  Hepatitis B Vaccine 3 dose series if at intermediate or high risk (ex: diabetes, end stage renal disease, liver disease)   Tetanus (Td) Vaccine - COST NOT COVERED BY MEDICARE PART B Following completion of primary series, a booster dose should be given every 10 years to maintain immunity against tetanus  Td may also be given as tetanus wound prophylaxis  Tdap Vaccine - COST NOT COVERED BY MEDICARE PART B Recommended at least once for all adults  For pregnant patients, recommended with each pregnancy  Shingles Vaccine (Shingrix) - COST NOT COVERED BY MEDICARE PART B  2 shot series recommended in those aged 48 and above     Health Maintenance Due:      Topic Date Due    Hepatitis C Screening  1955    Colonoscopy Surveillance  02/24/2020    Colorectal Cancer Screening  02/24/2027     Immunizations Due:      Topic Date Due    Pneumococcal Vaccine: 65+ Years (2 of 2 - PPSV23) 09/26/2019    Influenza Vaccine (1) 09/01/2020     Advance Directives   What are advance directives? Advance directives are legal documents that state your wishes and plans for medical care  These plans are made ahead of time in case you lose your ability to make decisions for yourself   Advance directives can apply to any medical decision, such as the treatments you want, and if you want to donate organs  What are the types of advance directives? There are many types of advance directives, and each state has rules about how to use them  You may choose a combination of any of the following:  · Living will: This is a written record of the treatment you want  You can also choose which treatments you do not want, which to limit, and which to stop at a certain time  This includes surgery, medicine, IV fluid, and tube feedings  · Durable power of  for healthcare [facility]): This is a written record that states who you want to make healthcare choices for you when you are unable to make them for yourself  This person, called a proxy, is usually a family member or a friend  You may choose more than 1 proxy  · Do not resuscitate (DNR) order:  A DNR order is used in case your heart stops beating or you stop breathing  It is a request not to have certain forms of treatment, such as CPR  A DNR order may be included in other types of advance directives  · Medical directive: This covers the care that you want if you are in a coma, near death, or unable to make decisions for yourself  You can list the treatments you want for each condition  Treatment may include pain medicine, surgery, blood transfusions, dialysis, IV or tube feedings, and a ventilator (breathing machine)  · Values history: This document has questions about your views, beliefs, and how you feel and think about life  This information can help others choose the care that you would choose  Why are advance directives important? An advance directive helps you control your care  Although spoken wishes may be used, it is better to have your wishes written down  Spoken wishes can be misunderstood, or not followed  Treatments may be given even if you do not want them   An advance directive may make it easier for your family to make difficult choices about your care    Weight Management   Why it is important to manage your weight:  Being overweight increases your risk of health conditions such as heart disease, high blood pressure, type 2 diabetes, and certain types of cancer  It can also increase your risk for osteoarthritis, sleep apnea, and other respiratory problems  Aim for a slow, steady weight loss  Even a small amount of weight loss can lower your risk of health problems  How to lose weight safely:  A safe and healthy way to lose weight is to eat fewer calories and get regular exercise  You can lose up about 1 pound a week by decreasing the number of calories you eat by 500 calories each day  Healthy meal plan for weight management:  A healthy meal plan includes a variety of foods, contains fewer calories, and helps you stay healthy  A healthy meal plan includes the following:  · Eat whole-grain foods more often  A healthy meal plan should contain fiber  Fiber is the part of grains, fruits, and vegetables that is not broken down by your body  Whole-grain foods are healthy and provide extra fiber in your diet  Some examples of whole-grain foods are whole-wheat breads and pastas, oatmeal, brown rice, and bulgur  · Eat a variety of vegetables every day  Include dark, leafy greens such as spinach, kale, kris greens, and mustard greens  Eat yellow and orange vegetables such as carrots, sweet potatoes, and winter squash  · Eat a variety of fruits every day  Choose fresh or canned fruit (canned in its own juice or light syrup) instead of juice  Fruit juice has very little or no fiber  · Eat low-fat dairy foods  Drink fat-free (skim) milk or 1% milk  Eat fat-free yogurt and low-fat cottage cheese  Try low-fat cheeses such as mozzarella and other reduced-fat cheeses  · Choose meat and other protein foods that are low in fat  Choose beans or other legumes such as split peas or lentils   Choose fish, skinless poultry (chicken or turkey), or lean cuts of red meat (beef or pork)  Before you cook meat or poultry, cut off any visible fat  · Use less fat and oil  Try baking foods instead of frying them  Add less fat, such as margarine, sour cream, regular salad dressing and mayonnaise to foods  Eat fewer high-fat foods  Some examples of high-fat foods include french fries, doughnuts, ice cream, and cakes  · Eat fewer sweets  Limit foods and drinks that are high in sugar  This includes candy, cookies, regular soda, and sweetened drinks  Exercise:  Exercise at least 30 minutes per day on most days of the week  Some examples of exercise include walking, biking, dancing, and swimming  You can also fit in more physical activity by taking the stairs instead of the elevator or parking farther away from stores  Ask your healthcare provider about the best exercise plan for you  © Copyright [employer] 2018 Information is for End User's use only and may not be sold, redistributed or otherwise used for commercial purposes  All illustrations and images included in CareNotes® are the copyrighted property of [employer]  or [facility] Preventive Visit Patient Instructions  Thank you for completing your Welcome to Medicare Visit or Medicare Annual Wellness Visit today  Your next wellness visit will be due in one year (3/2/2022)  The screening/preventive services that you may require over the next 5-10 years are detailed below  Some tests may not apply to you based off risk factors and/or age  Screening tests ordered at today's visit but not completed yet may show as past due  Also, please note that scanned in results may not display below    Preventive Screenings:  Service Recommendations Previous Testing/Comments   Colorectal Cancer Screening  · Colonoscopy    · Fecal Occult Blood Test (FOBT)/Fecal Immunochemical Test (FIT)  · Fecal DNA/Cologuard Test  · Flexible Sigmoidoscopy Age: 54-65 years old   Colonoscopy: every 10 years (May be performed more frequently if at higher risk)  OR  FOBT/FIT: every 1 year  OR  Cologuard: every 3 years  OR  Sigmoidoscopy: every 5 years  Screening may be recommended earlier than age 48 if at higher risk for colorectal cancer  Also, an individualized decision between you and your healthcare provider will decide whether screening between the ages of 74-80 would be appropriate  Colonoscopy: 02/24/2017  FOBT/FIT: Not on file  Cologuard: Not on file  Sigmoidoscopy: Not on file    Screening Current     Prostate Cancer Screening Individualized decision between patient and health care provider in men between ages of 53-78   Medicare will cover every 12 months beginning on the day after your 50th birthday PSA: 1 6 ng/mL          Hepatitis C Screening Once for adults born between 1945 and 1965  More frequently in patients at high risk for Hepatitis C Hep C Antibody: Not on file       Diabetes Screening 1-2 times per year if you're at risk for diabetes or have pre-diabetes Fasting glucose: 95 mg/dL   A1C: No results in last 5 years       Cholesterol Screening Once every 5 years if you don't have a lipid disorder  May order more often based on risk factors  Lipid panel: 11/06/2018    Screening Not Indicated  History Lipid Disorder      Other Preventive Screenings Covered by Medicare:  11  Abdominal Aortic Aneurysm (AAA) Screening: covered once if your at risk  You're considered to be at risk if you have a family history of AAA or a male between the age of 73-68 who smoking at least 100 cigarettes in your lifetime  12  Lung Cancer Screening: covers low dose CT scan once per year if you meet all of the following conditions: (1) Age 50-69; (2) No signs or symptoms of lung cancer; (3) Current smoker or have quit smoking within the last 15 years; (4) You have a tobacco smoking history of at least 30 pack years (packs per day x number of years you smoked); (5) You get a written order from a healthcare provider    13  Glaucoma Screening: covered annually if you're considered high risk: (1) You have diabetes OR (2) Family history of glaucoma OR (3)  aged 48 and older OR (3)  American aged 72 and older  15  Osteoporosis Screening: covered every 2 years if you meet one of the following conditions: (1) Have a vertebral abnormality; (2) On glucocorticoid therapy for more than 3 months; (3) Have primary hyperparathyroidism; (4) On osteoporosis medications and need to assess response to drug therapy  15  HIV Screening: covered annually if you're between the age of 12-76  Also covered annually if you are younger than 13 and older than 72 with risk factors for HIV infection  For pregnant patients, it is covered up to 3 times per pregnancy  Immunizations:  Immunization Recommendations   Influenza Vaccine Annual influenza vaccination during flu season is recommended for all persons aged >= 6 months who do not have contraindications   Pneumococcal Vaccine (Prevnar and Pneumovax)  * Prevnar = PCV13  * Pneumovax = PPSV23 Adults 25-60 years old: 1-3 doses may be recommended based on certain risk factors  Adults 72 years old: Prevnar (PCV13) vaccine recommended followed by Pneumovax (PPSV23) vaccine  If already received PPSV23 since turning 65, then PCV13 recommended at least one year after PPSV23 dose  Hepatitis B Vaccine 3 dose series if at intermediate or high risk (ex: diabetes, end stage renal disease, liver disease)   Tetanus (Td) Vaccine - COST NOT COVERED BY MEDICARE PART B Following completion of primary series, a booster dose should be given every 10 years to maintain immunity against tetanus  Td may also be given as tetanus wound prophylaxis  Tdap Vaccine - COST NOT COVERED BY MEDICARE PART B Recommended at least once for all adults  For pregnant patients, recommended with each pregnancy     Shingles Vaccine (Shingrix) - COST NOT COVERED BY MEDICARE PART B  2 shot series recommended in those aged 48 and above     Health Maintenance Due:      Topic Date Due    Hepatitis C Screening  1955    Colonoscopy Surveillance  02/24/2020    Colorectal Cancer Screening  02/24/2027     Immunizations Due:      Topic Date Due    Pneumococcal Vaccine: 65+ Years (2 of 2 - PPSV23) 09/26/2019    Influenza Vaccine (1) 09/01/2020     Advance Directives   What are advance directives? Advance directives are legal documents that state your wishes and plans for medical care  These plans are made ahead of time in case you lose your ability to make decisions for yourself  Advance directives can apply to any medical decision, such as the treatments you want, and if you want to donate organs  What are the types of advance directives? There are many types of advance directives, and each state has rules about how to use them  You may choose a combination of any of the following:  · Living will: This is a written record of the treatment you want  You can also choose which treatments you do not want, which to limit, and which to stop at a certain time  This includes surgery, medicine, IV fluid, and tube feedings  · Durable power of  for healthcare [facility]): This is a written record that states who you want to make healthcare choices for you when you are unable to make them for yourself  This person, called a proxy, is usually a family member or a friend  You may choose more than 1 proxy  · Do not resuscitate (DNR) order:  A DNR order is used in case your heart stops beating or you stop breathing  It is a request not to have certain forms of treatment, such as CPR  A DNR order may be included in other types of advance directives  · Medical directive: This covers the care that you want if you are in a coma, near death, or unable to make decisions for yourself  You can list the treatments you want for each condition   Treatment may include pain medicine, surgery, blood transfusions, dialysis, IV or tube feedings, and a ventilator (breathing machine)  · Values history: This document has questions about your views, beliefs, and how you feel and think about life  This information can help others choose the care that you would choose  Why are advance directives important? An advance directive helps you control your care  Although spoken wishes may be used, it is better to have your wishes written down  Spoken wishes can be misunderstood, or not followed  Treatments may be given even if you do not want them  An advance directive may make it easier for your family to make difficult choices about your care  Weight Management   Why it is important to manage your weight:  Being overweight increases your risk of health conditions such as heart disease, high blood pressure, type 2 diabetes, and certain types of cancer  It can also increase your risk for osteoarthritis, sleep apnea, and other respiratory problems  Aim for a slow, steady weight loss  Even a small amount of weight loss can lower your risk of health problems  How to lose weight safely:  A safe and healthy way to lose weight is to eat fewer calories and get regular exercise  You can lose up about 1 pound a week by decreasing the number of calories you eat by 500 calories each day  Healthy meal plan for weight management:  A healthy meal plan includes a variety of foods, contains fewer calories, and helps you stay healthy  A healthy meal plan includes the following:  · Eat whole-grain foods more often  A healthy meal plan should contain fiber  Fiber is the part of grains, fruits, and vegetables that is not broken down by your body  Whole-grain foods are healthy and provide extra fiber in your diet  Some examples of whole-grain foods are whole-wheat breads and pastas, oatmeal, brown rice, and bulgur  · Eat a variety of vegetables every day  Include dark, leafy greens such as spinach, kale, kris greens, and mustard greens   Eat yellow and orange vegetables such as carrots, sweet potatoes, and winter squash  · Eat a variety of fruits every day  Choose fresh or canned fruit (canned in its own juice or light syrup) instead of juice  Fruit juice has very little or no fiber  · Eat low-fat dairy foods  Drink fat-free (skim) milk or 1% milk  Eat fat-free yogurt and low-fat cottage cheese  Try low-fat cheeses such as mozzarella and other reduced-fat cheeses  · Choose meat and other protein foods that are low in fat  Choose beans or other legumes such as split peas or lentils  Choose fish, skinless poultry (chicken or turkey), or lean cuts of red meat (beef or pork)  Before you cook meat or poultry, cut off any visible fat  · Use less fat and oil  Try baking foods instead of frying them  Add less fat, such as margarine, sour cream, regular salad dressing and mayonnaise to foods  Eat fewer high-fat foods  Some examples of high-fat foods include french fries, doughnuts, ice cream, and cakes  · Eat fewer sweets  Limit foods and drinks that are high in sugar  This includes candy, cookies, regular soda, and sweetened drinks  Exercise:  Exercise at least 30 minutes per day on most days of the week  Some examples of exercise include walking, biking, dancing, and swimming  You can also fit in more physical activity by taking the stairs instead of the elevator or parking farther away from stores  Ask your healthcare provider about the best exercise plan for you  © Copyright [employer] 2018 Information is for End User's use only and may not be sold, redistributed or otherwise used for commercial purposes   All illustrations and images included in CareNotes® are the copyrighted property of A D A M , [employer]  or [address] [place] [address] [employer]

## 2021-03-02 NOTE — PROGRESS NOTES
Assessment/Plan:    Hypertension, essential  Well controlled  Cont present treatment  Monitor labs  Continue weight loss efforts  BMI Counseling: Body mass index is 29 89 kg/m²  The BMI is above normal  Nutrition recommendations include reducing portion sizes, consuming healthier snacks, moderation in carbohydrate intake, increasing intake of lean protein, reducing intake of saturated fat and trans fat and reducing intake of cholesterol  Recheck 6m      Hyperlipidemia  Check labs  Continue to monitor diet  Continue present meds  Recheck 6m       Diagnoses and all orders for this visit:    Medicare annual wellness visit, subsequent    Hypertension, essential  -     CBC and differential; Future  -     Comprehensive metabolic panel; Future  -     Lipid panel; Future    Mixed hyperlipidemia    Screening for cardiovascular condition  -     Cancel: Lipid panel; Future    Screening for prostate cancer  -     PSA, Total Screen; Future    Need for hepatitis C screening test          Subjective:      Patient ID: Wen Hernandez  is a 77 y o  male  f/u multiple med issues and AWV  - pt feels well  - still tries to exercise / stay active  Continues to work on diet and has still losing weight slowly  Pt denies CP, palpitations, lightheadedness or other CV symptoms with or without exertion  - patient denies any new GI or Gu complaints at present  Patient a little overdue for repeat colonoscopy (was due last year)  - no extremity issues     - AWV done        The following portions of the patient's history were reviewed and updated as appropriate:   He  has a past medical history of Elevated cholesterol with high triglycerides  He   Patient Active Problem List    Diagnosis Date Noted    Hypertension, essential 03/11/2015    Hyperlipidemia 11/08/2012     He  has a past surgical history that includes Hernia repair (Left) and pr colonoscopy flx dx w/collj spec when pfrmd (N/A, 2/24/2017)  He  reports that he has never smoked  He does not have any smokeless tobacco history on file  He reports current alcohol use  He reports that he does not use drugs  Current Outpatient Medications   Medication Sig Dispense Refill    amLODIPine (NORVASC) 5 mg tablet Take 1 tablet (5 mg total) by mouth daily 90 tablet 3    fenofibrate (TRICOR) 48 mg tablet Take 1 tablet (48 mg total) by mouth daily 90 tablet 3     No current facility-administered medications for this visit  He is allergic to penicillins       Review of Systems   Constitutional: Negative  HENT: Negative  Eyes: Negative  Respiratory: Negative  Cardiovascular: Negative  Gastrointestinal: Negative  Endocrine: Negative  Genitourinary: Negative  Musculoskeletal: Negative  Skin: Negative  Allergic/Immunologic: Negative  Neurological: Negative  Hematological: Negative  Psychiatric/Behavioral: Negative  Objective:      /78   Pulse 84   Temp 98 4 °F (36 9 °C)   Ht 6' (1 829 m)   Wt 100 kg (220 lb 6 4 oz)   SpO2 95%   BMI 29 89 kg/m²          Physical Exam  Vitals signs reviewed  Constitutional:       Appearance: He is well-developed  HENT:      Head: Normocephalic and atraumatic  Right Ear: Tympanic membrane, ear canal and external ear normal       Left Ear: Tympanic membrane, ear canal and external ear normal    Eyes:      General: No scleral icterus  Extraocular Movements: Extraocular movements intact  Conjunctiva/sclera: Conjunctivae normal       Pupils: Pupils are equal, round, and reactive to light  Neck:      Musculoskeletal: Normal range of motion and neck supple  No muscular tenderness  Thyroid: No thyromegaly  Vascular: No carotid bruit  Cardiovascular:      Rate and Rhythm: Normal rate and regular rhythm  Pulses: Normal pulses  Heart sounds: Normal heart sounds  No murmur  Pulmonary:      Effort: Pulmonary effort is normal       Breath sounds: Normal breath sounds     Abdominal: General: Bowel sounds are normal  There is no distension  Palpations: Abdomen is soft  There is no mass  Tenderness: There is no abdominal tenderness  Musculoskeletal: Normal range of motion  General: No swelling, tenderness or deformity  Right lower leg: No edema  Left lower leg: No edema  Lymphadenopathy:      Cervical: No cervical adenopathy  Skin:     General: Skin is warm and dry  Capillary Refill: Capillary refill takes less than 2 seconds  Neurological:      Mental Status: He is alert and oriented to person, place, and time  Cranial Nerves: No cranial nerve deficit  Sensory: No sensory deficit  Motor: No weakness  Gait: Gait normal       Comments: Minicog 5/5   Psychiatric:         Mood and Affect: Mood normal          Behavior: Behavior normal          Thought Content:  Thought content normal          Judgment: Judgment normal       Comments: PHQ-9 Depression Screening    PHQ-9:   Frequency of the following problems over the past two weeks:      Little interest or pleasure in doing things: 0 - not at all  Feeling down, depressed, or hopeless: 0 - not at all  PHQ-2 Score: 0

## 2021-03-03 NOTE — ASSESSMENT & PLAN NOTE
Well controlled  Cont present treatment  Monitor labs  Continue weight loss efforts  BMI Counseling: Body mass index is 29 89 kg/m²  The BMI is above normal  Nutrition recommendations include reducing portion sizes, consuming healthier snacks, moderation in carbohydrate intake, increasing intake of lean protein, reducing intake of saturated fat and trans fat and reducing intake of cholesterol    Recheck 6m

## 2021-09-22 ENCOUNTER — OFFICE VISIT (OUTPATIENT)
Dept: FAMILY MEDICINE CLINIC | Facility: CLINIC | Age: 66
End: 2021-09-22
Payer: MEDICARE

## 2021-09-22 VITALS
HEIGHT: 72 IN | HEART RATE: 82 BPM | DIASTOLIC BLOOD PRESSURE: 80 MMHG | TEMPERATURE: 97.9 F | BODY MASS INDEX: 33.05 KG/M2 | SYSTOLIC BLOOD PRESSURE: 122 MMHG | WEIGHT: 244 LBS

## 2021-09-22 DIAGNOSIS — H92.02 LEFT EAR PAIN: ICD-10-CM

## 2021-09-22 DIAGNOSIS — I10 ESSENTIAL HYPERTENSION: Primary | ICD-10-CM

## 2021-09-22 DIAGNOSIS — E78.2 MIXED HYPERLIPIDEMIA: ICD-10-CM

## 2021-09-22 PROCEDURE — 99214 OFFICE O/P EST MOD 30 MIN: CPT | Performed by: FAMILY MEDICINE

## 2021-09-22 RX ORDER — OFLOXACIN 3 MG/ML
10 SOLUTION AURICULAR (OTIC) 2 TIMES DAILY
Qty: 5 ML | Refills: 1 | Status: SHIPPED | OUTPATIENT
Start: 2021-09-22 | End: 2022-05-08

## 2021-09-22 RX ORDER — AMLODIPINE BESYLATE 5 MG/1
5 TABLET ORAL DAILY
Qty: 90 TABLET | Refills: 3 | Status: SHIPPED | OUTPATIENT
Start: 2021-09-22

## 2021-09-22 RX ORDER — OFLOXACIN 3 MG/ML
SOLUTION/ DROPS OPHTHALMIC
COMMUNITY
Start: 2021-06-23 | End: 2021-09-22 | Stop reason: ALTCHOICE

## 2021-09-22 NOTE — PROGRESS NOTES
Assessment/Plan:    Hypertension, essential  Well controlled  Cont present treatment  Monitor labs  Discussed weight loss effect on BP  BMI Counseling: Body mass index is 33 09 kg/m²  The BMI is above normal  Nutrition recommendations include reducing portion sizes, moderation in carbohydrate intake, increasing intake of lean protein, reducing intake of saturated fat and trans fat and reducing intake of cholesterol  Exercise recommendations include exercising 3-5 times per week  Recheck 6m      Hyperlipidemia  Check labs  Continue fenofibrate  Recheck 6m    Left ear pain  ?related to furuncle  Restart Floxin otic susp, bid  Recheck 2-3w if not improving - earlier if worse       Diagnoses and all orders for this visit:    Essential hypertension  -     amLODIPine (NORVASC) 5 mg tablet; Take 1 tablet (5 mg total) by mouth daily    Mixed hyperlipidemia    Left ear pain  Comments:  ?related to furuncle  Restart Floxin otic susp, bid  Recheck 2-3w if not improving - earlier if worse  Orders:  -     ofloxacin (FLOXIN) 0 3 % otic solution; Administer 10 drops into the left ear 2 (two) times a day    Other orders  -     Discontinue: ofloxacin (OCUFLOX) 0 3 % ophthalmic solution; Instill 10 drops left ear once daily          Subjective:      Patient ID: Marjorie Peck  is a 74 y o  male  f/u multiple med issues  - pt feels well    - has been having off and on L ear discomfort for a couple of months  Has used Floxin otic that helps somewhat but symptoms return  No hearing change or vertigo  - still tries to exercise - walks around 20m a week  Pt denies CP, palpitations, lightheadedness or other CV symptoms with or without exertion  - patient denies any new GI or Gu complaints at present  Patient overdue for repeat colonoscopy -  Has delayed it until COVID cases decrease  - no extremity issues              The following portions of the patient's history were reviewed and updated as appropriate:   He  has a past medical history of Elevated cholesterol with high triglycerides  He   Patient Active Problem List    Diagnosis Date Noted    Left ear pain 09/23/2021    Hypertension, essential 03/11/2015    Hyperlipidemia 11/08/2012     He  has a past surgical history that includes Hernia repair (Left) and pr colonoscopy flx dx w/collj spec when pfrmd (N/A, 2/24/2017)  He  reports that he has never smoked  He does not have any smokeless tobacco history on file  He reports current alcohol use  He reports that he does not use drugs  Current Outpatient Medications   Medication Sig Dispense Refill    amLODIPine (NORVASC) 5 mg tablet Take 1 tablet (5 mg total) by mouth daily 90 tablet 3    fenofibrate (TRICOR) 48 mg tablet Take 1 tablet (48 mg total) by mouth daily 90 tablet 3    ofloxacin (FLOXIN) 0 3 % otic solution Administer 10 drops into the left ear 2 (two) times a day 5 mL 1     No current facility-administered medications for this visit  He is allergic to penicillins       Review of Systems   Constitutional: Negative  HENT: Positive for ear pain  Negative for congestion, ear discharge, facial swelling, rhinorrhea, sinus pressure, sinus pain and sore throat  Eyes: Negative  Respiratory: Negative  Cardiovascular: Negative  Gastrointestinal: Negative  Endocrine: Negative  Genitourinary: Negative  Musculoskeletal: Negative  Skin: Negative  Allergic/Immunologic: Negative  Neurological: Negative  Hematological: Negative  Psychiatric/Behavioral: Negative  Objective:      /80   Pulse 82   Temp 97 9 °F (36 6 °C)   Ht 6' (1 829 m)   Wt 111 kg (244 lb)   BMI 33 09 kg/m²          Physical Exam  Vitals reviewed  Constitutional:       Appearance: He is well-developed  HENT:      Head: Normocephalic and atraumatic        Right Ear: Tympanic membrane, ear canal and external ear normal       Left Ear: Tympanic membrane and external ear normal       Ears:      Comments: ?small furuncle like area in L canal near the orifice  No drainage  Sl TTP  Eyes:      General: No scleral icterus  Extraocular Movements: Extraocular movements intact  Conjunctiva/sclera: Conjunctivae normal       Pupils: Pupils are equal, round, and reactive to light  Neck:      Thyroid: No thyromegaly  Vascular: No carotid bruit  Cardiovascular:      Rate and Rhythm: Normal rate and regular rhythm  Pulses: Normal pulses  Heart sounds: Normal heart sounds  No murmur heard  Pulmonary:      Effort: Pulmonary effort is normal       Breath sounds: Normal breath sounds  Abdominal:      General: Bowel sounds are normal  There is no distension  Palpations: Abdomen is soft  There is no mass  Tenderness: There is no abdominal tenderness  Musculoskeletal:         General: No swelling, tenderness or deformity  Normal range of motion  Cervical back: Normal range of motion and neck supple  No muscular tenderness  Right lower leg: No edema  Left lower leg: No edema  Lymphadenopathy:      Cervical: No cervical adenopathy  Skin:     General: Skin is warm and dry  Capillary Refill: Capillary refill takes less than 2 seconds  Neurological:      Mental Status: He is alert and oriented to person, place, and time  Cranial Nerves: No cranial nerve deficit  Sensory: No sensory deficit  Motor: No weakness        Gait: Gait normal    Psychiatric:         Mood and Affect: Mood normal

## 2021-09-23 PROBLEM — H92.02 LEFT EAR PAIN: Status: ACTIVE | Noted: 2021-09-23

## 2021-09-23 NOTE — ASSESSMENT & PLAN NOTE
?related to furuncle  Restart Floxin otic susp, bid   Recheck 2-3w if not improving - earlier if worse

## 2021-09-23 NOTE — ASSESSMENT & PLAN NOTE
Well controlled  Cont present treatment  Monitor labs  Discussed weight loss effect on BP  BMI Counseling: Body mass index is 33 09 kg/m²  The BMI is above normal  Nutrition recommendations include reducing portion sizes, moderation in carbohydrate intake, increasing intake of lean protein, reducing intake of saturated fat and trans fat and reducing intake of cholesterol  Exercise recommendations include exercising 3-5 times per week     Recheck 6m

## 2022-03-02 DIAGNOSIS — E78.2 MIXED HYPERLIPIDEMIA: ICD-10-CM

## 2022-03-02 RX ORDER — FENOFIBRATE 48 MG/1
48 TABLET, COATED ORAL DAILY
Qty: 90 TABLET | Refills: 3 | Status: SHIPPED | OUTPATIENT
Start: 2022-03-02 | End: 2022-07-01 | Stop reason: SDUPTHER

## 2022-03-22 ENCOUNTER — OFFICE VISIT (OUTPATIENT)
Dept: FAMILY MEDICINE CLINIC | Facility: CLINIC | Age: 67
End: 2022-03-22
Payer: MEDICARE

## 2022-03-22 ENCOUNTER — APPOINTMENT (OUTPATIENT)
Dept: LAB | Facility: CLINIC | Age: 67
End: 2022-03-22
Payer: MEDICARE

## 2022-03-22 VITALS
DIASTOLIC BLOOD PRESSURE: 90 MMHG | BODY MASS INDEX: 34.4 KG/M2 | HEART RATE: 75 BPM | WEIGHT: 254 LBS | OXYGEN SATURATION: 98 % | TEMPERATURE: 99 F | HEIGHT: 72 IN | SYSTOLIC BLOOD PRESSURE: 134 MMHG

## 2022-03-22 DIAGNOSIS — Z00.00 MEDICARE ANNUAL WELLNESS VISIT, SUBSEQUENT: Primary | ICD-10-CM

## 2022-03-22 DIAGNOSIS — Z11.59 NEED FOR HEPATITIS C SCREENING TEST: ICD-10-CM

## 2022-03-22 DIAGNOSIS — E78.2 MIXED HYPERLIPIDEMIA: ICD-10-CM

## 2022-03-22 DIAGNOSIS — Z12.11 SCREENING FOR COLORECTAL CANCER: ICD-10-CM

## 2022-03-22 DIAGNOSIS — I10 HYPERTENSION, ESSENTIAL: ICD-10-CM

## 2022-03-22 DIAGNOSIS — Z12.12 SCREENING FOR COLORECTAL CANCER: ICD-10-CM

## 2022-03-22 DIAGNOSIS — R73.9 HYPERGLYCEMIA: ICD-10-CM

## 2022-03-22 DIAGNOSIS — Z12.5 SCREENING FOR PROSTATE CANCER: ICD-10-CM

## 2022-03-22 PROBLEM — H92.02 LEFT EAR PAIN: Status: RESOLVED | Noted: 2021-09-23 | Resolved: 2022-03-22

## 2022-03-22 LAB
ALBUMIN SERPL BCP-MCNC: 4.1 G/DL (ref 3.5–5)
ALP SERPL-CCNC: 94 U/L (ref 46–116)
ALT SERPL W P-5'-P-CCNC: 61 U/L (ref 12–78)
ANION GAP SERPL CALCULATED.3IONS-SCNC: 7 MMOL/L (ref 4–13)
AST SERPL W P-5'-P-CCNC: 43 U/L (ref 5–45)
BILIRUB SERPL-MCNC: 0.8 MG/DL (ref 0.2–1)
BUN SERPL-MCNC: 10 MG/DL (ref 5–25)
CALCIUM SERPL-MCNC: 9.4 MG/DL (ref 8.3–10.1)
CHLORIDE SERPL-SCNC: 109 MMOL/L (ref 100–108)
CHOLEST SERPL-MCNC: 228 MG/DL
CO2 SERPL-SCNC: 24 MMOL/L (ref 21–32)
CREAT SERPL-MCNC: 0.99 MG/DL (ref 0.6–1.3)
GFR SERPL CREATININE-BSD FRML MDRD: 77 ML/MIN/1.73SQ M
GLUCOSE P FAST SERPL-MCNC: 110 MG/DL (ref 65–99)
HCV AB SER QL: NORMAL
HDLC SERPL-MCNC: 36 MG/DL
LDLC SERPL CALC-MCNC: 160 MG/DL (ref 0–100)
NONHDLC SERPL-MCNC: 192 MG/DL
POTASSIUM SERPL-SCNC: 4 MMOL/L (ref 3.5–5.3)
PROT SERPL-MCNC: 7.5 G/DL (ref 6.4–8.2)
PSA SERPL-MCNC: 2.1 NG/ML (ref 0–4)
SODIUM SERPL-SCNC: 140 MMOL/L (ref 136–145)
TRIGL SERPL-MCNC: 160 MG/DL

## 2022-03-22 PROCEDURE — 1123F ACP DISCUSS/DSCN MKR DOCD: CPT | Performed by: FAMILY MEDICINE

## 2022-03-22 PROCEDURE — 36415 COLL VENOUS BLD VENIPUNCTURE: CPT

## 2022-03-22 PROCEDURE — 86803 HEPATITIS C AB TEST: CPT

## 2022-03-22 PROCEDURE — 80053 COMPREHEN METABOLIC PANEL: CPT

## 2022-03-22 PROCEDURE — 80061 LIPID PANEL: CPT

## 2022-03-22 PROCEDURE — 99214 OFFICE O/P EST MOD 30 MIN: CPT | Performed by: FAMILY MEDICINE

## 2022-03-22 PROCEDURE — G0439 PPPS, SUBSEQ VISIT: HCPCS | Performed by: FAMILY MEDICINE

## 2022-03-22 PROCEDURE — 83036 HEMOGLOBIN GLYCOSYLATED A1C: CPT

## 2022-03-22 PROCEDURE — G0103 PSA SCREENING: HCPCS

## 2022-03-22 NOTE — PROGRESS NOTES
Assessment/Plan:    Hypertension, essential  Well controlled  Cont present treatment  Monitor labs  Recheck 6m      Hyperlipidemia  Overdue for labs  Continue present care  Check labs - adjust meds/treatment if not at goal  Recheck 6m       Diagnoses and all orders for this visit:    Medicare annual wellness visit, subsequent    Hypertension, essential  -     CBC and differential; Future  -     Comprehensive metabolic panel; Future  -     Lipid panel; Future    Mixed hyperlipidemia    Screening for prostate cancer  -     PSA, Total Screen; Future    Need for hepatitis C screening test  -     Hepatitis C antibody; Future    Screening for colorectal cancer  -     Ambulatory referral to Colorectal Surgery; Future          Subjective:      Patient ID: Shanique Lezama  is a 74 y o  male  f/u multiple med issues  - pt feels well  - pt has gained a little weight over the winter  Has fallen off of his diet, but is now working onit  Still walking and trying to increase now that the weather is better  Denies CP, palpitations, lightheadedness or other CV symptoms with or without exertion  - patient denies any new GI or Gu complaints at present  Patient to schedule a colonoscopy this year  - no new extremity complaints  - AWV done        The following portions of the patient's history were reviewed and updated as appropriate:   He  has a past medical history of Elevated cholesterol with high triglycerides  He   Patient Active Problem List    Diagnosis Date Noted    Hypertension, essential 03/11/2015    Hyperlipidemia 11/08/2012     He  has a past surgical history that includes Hernia repair (Left) and pr colonoscopy flx dx w/collj spec when pfrmd (N/A, 2/24/2017)  He  reports that he has never smoked  He has never used smokeless tobacco  He reports current alcohol use  He reports that he does not use drugs    Current Outpatient Medications   Medication Sig Dispense Refill    amLODIPine (NORVASC) 5 mg tablet Take 1 tablet (5 mg total) by mouth daily 90 tablet 3    fenofibrate (TRICOR) 48 mg tablet Take 1 tablet (48 mg total) by mouth daily 90 tablet 3    ofloxacin (FLOXIN) 0 3 % otic solution Administer 10 drops into the left ear 2 (two) times a day (Patient not taking: Reported on 3/22/2022 ) 5 mL 1     No current facility-administered medications for this visit  He is allergic to penicillins       Review of Systems   Constitutional: Negative  HENT: Negative  Eyes: Negative  Respiratory: Negative  Cardiovascular: Negative  Gastrointestinal: Negative  Endocrine: Negative  Genitourinary: Negative  Musculoskeletal: Negative  Skin: Negative  Allergic/Immunologic: Negative  Neurological: Negative  Hematological: Negative  Psychiatric/Behavioral: Negative  Objective:      /90 (BP Location: Left arm, Patient Position: Sitting, Cuff Size: Large)   Pulse 75   Temp 99 °F (37 2 °C) (Tympanic)   Ht 6' (1 829 m)   Wt 115 kg (254 lb)   SpO2 98%   BMI 34 45 kg/m²          Physical Exam  Vitals reviewed  Constitutional:       Appearance: He is well-developed  HENT:      Head: Normocephalic and atraumatic  Right Ear: Tympanic membrane, ear canal and external ear normal       Left Ear: Tympanic membrane, ear canal and external ear normal    Eyes:      General: No scleral icterus  Extraocular Movements: Extraocular movements intact  Conjunctiva/sclera: Conjunctivae normal       Pupils: Pupils are equal, round, and reactive to light  Neck:      Thyroid: No thyromegaly  Vascular: No carotid bruit  Cardiovascular:      Rate and Rhythm: Normal rate and regular rhythm  Pulses: Normal pulses  Heart sounds: Normal heart sounds  No murmur heard  Pulmonary:      Effort: Pulmonary effort is normal       Breath sounds: Normal breath sounds  Abdominal:      General: Bowel sounds are normal  There is no distension  Palpations: Abdomen is soft  There is no mass  Tenderness: There is no abdominal tenderness  Musculoskeletal:         General: No swelling, tenderness or deformity  Normal range of motion  Cervical back: Normal range of motion and neck supple  No muscular tenderness  Right lower leg: No edema  Left lower leg: No edema  Lymphadenopathy:      Cervical: No cervical adenopathy  Skin:     General: Skin is warm and dry  Capillary Refill: Capillary refill takes less than 2 seconds  Neurological:      Mental Status: He is alert and oriented to person, place, and time  Cranial Nerves: No cranial nerve deficit  Sensory: No sensory deficit  Motor: No weakness  Gait: Gait normal       Comments: Minicog 3/5 (1/3 recall)   Psychiatric:         Mood and Affect: Mood normal          Behavior: Behavior normal          Thought Content:  Thought content normal          Judgment: Judgment normal       Comments: PHQ-2/9 Depression Screening    Little interest or pleasure in doing things: 0 - not at all  Feeling down, depressed, or hopeless: 0 - not at all  PHQ-2 Score: 0  PHQ-2 Interpretation: Negative depression screen

## 2022-03-22 NOTE — PROGRESS NOTES
Assessment and Plan:     Problem List Items Addressed This Visit        Cardiovascular and Mediastinum    Hypertension, essential     Well controlled  Cont present treatment  Monitor labs  Recheck 6m           Relevant Orders    CBC and differential    Comprehensive metabolic panel    Lipid panel       Other    Hyperlipidemia     Overdue for labs  Continue present care  Check labs - adjust meds/treatment if not at goal  Recheck 6m           Other Visit Diagnoses     Medicare annual wellness visit, subsequent    -  Primary    Screening for prostate cancer        Relevant Orders    PSA, Total Screen (Completed)    Need for hepatitis C screening test        Relevant Orders    Hepatitis C antibody    Screening for colorectal cancer        Relevant Orders    Ambulatory referral to Colorectal Surgery           Preventive health issues were discussed with patient, and age appropriate screening tests were ordered as noted in patient's After Visit Summary  Personalized health advice and appropriate referrals for health education or preventive services given if needed, as noted in patient's After Visit Summary  History of Present Illness:     Patient presents for Medicare Annual Wellness visit    Patient Care Team:  Shahnaz Dee MD as PCP - Faith Velarde MD as Endoscopist     Problem List:     Patient Active Problem List   Diagnosis    Hyperlipidemia    Hypertension, essential      Past Medical and Surgical History:     Past Medical History:   Diagnosis Date    Elevated cholesterol with high triglycerides      Past Surgical History:   Procedure Laterality Date    HERNIA REPAIR Left     inguinal    OK COLONOSCOPY FLX DX W/COLLJ SPEC WHEN PFRMD N/A 2/24/2017    Procedure: COLONOSCOPY;  Surgeon: Mary Jo Castillo MD;  Location: [facility];   Service: Colorectal      Family History:     Family History   Problem Relation Age of Onset    Diabetes Mother     Ulcerative colitis Son     Stroke Father         SYNDROME       Social History:     Social History     Socioeconomic History    Marital status: /Civil Union     Spouse name: None    Number of children: None    Years of education: None    Highest education level: None   Occupational History    None   Tobacco Use    Smoking status: Never Smoker    Smokeless tobacco: Never Used   Vaping Use    Vaping Use: Never used   Substance and Sexual Activity    Alcohol use: Yes     Comment: 1-2 per week; DENIED: HISTORY OF ALCOHOL USE AS PER ALL SCRIPTS    Drug use: No    Sexual activity: None   Other Topics Concern    None   Social History Narrative    Denied: history of daily coffee consumption    Daily cola consumption    Denied: history of daily tea consumption      Social Determinants of Health     Financial Resource Strain: Not on file   Food Insecurity: Not on file   Transportation Needs: Not on file   Physical Activity: Not on file   Stress: Not on file   Social Connections: Not on file   Intimate Partner Violence: Not on file   Housing Stability: Not on file      Medications and Allergies:     Current Outpatient Medications   Medication Sig Dispense Refill    amLODIPine (NORVASC) 5 mg tablet Take 1 tablet (5 mg total) by mouth daily 90 tablet 3    fenofibrate (TRICOR) 48 mg tablet Take 1 tablet (48 mg total) by mouth daily 90 tablet 3    ofloxacin (FLOXIN) 0 3 % otic solution Administer 10 drops into the left ear 2 (two) times a day (Patient not taking: Reported on 3/22/2022 ) 5 mL 1     No current facility-administered medications for this visit       Allergies   Allergen Reactions    Penicillins Rash      Immunizations:     Immunization History   Administered Date(s) Administered    COVID-19 MODERNA VACC 0 5 ML IM 01/19/2021, 02/16/2021    Influenza Quadrivalent, 6-35 Months IM 11/07/2017    Influenza, high dose seasonal 0 7 mL 09/26/2018, 09/19/2019    Pneumococcal Conjugate 13-Valent 09/26/2018    Tdap 09/25/2020 Health Maintenance:         Topic Date Due    Hepatitis C Screening  Never done    Colorectal Cancer Screening  02/24/2020         Topic Date Due    Pneumococcal Vaccine: 65+ Years (2 of 2 - PPSV23) 09/26/2019    COVID-19 Vaccine (3 - Booster for Moderna series) 07/16/2021    Influenza Vaccine (1) 09/01/2021      Medicare Health Risk Assessment:     /90 (BP Location: Left arm, Patient Position: Sitting, Cuff Size: Large)   Pulse 75   Temp 99 °F (37 2 °C) (Tympanic)   Ht 6' (1 829 m)   Wt 115 kg (254 lb)   SpO2 98%   BMI 34 45 kg/m²          Health Risk Assessment:   Patient rates overall health as very good  Patient feels that their physical health rating is same  Patient is very satisfied with their life  Eyesight was rated as slightly worse  Hearing was rated as same  Patient feels that their emotional and mental health rating is same  Patients states they are sometimes angry  Patient states they are never, rarely unusually tired/fatigued  Pain experienced in the last 7 days has been some  Patient's pain rating has been 1/10  Patient states that he has experienced no weight loss or gain in last 6 months  Thumb pain    Depression Screening:   PHQ-2 Score: 0      Fall Risk Screening: In the past year, patient has experienced: no history of falling in past year      Home Safety:  Patient does not have trouble with stairs inside or outside of their home  Patient has working smoke alarms and has no working carbon monoxide detector  Home safety hazards include: none  Nutrition:   Current diet is Unhealthy  Medications:   Patient is not currently taking any over-the-counter supplements  Patient is able to manage medications  Activities of Daily Living (ADLs)/Instrumental Activities of Daily Living (IADLs):   Walk and transfer into and out of bed and chair?: Yes  Dress and groom yourself?: Yes    Bathe or shower yourself?: Yes    Feed yourself?  Yes  Do your laundry/housekeeping?: Yes  Manage your money, pay your bills and track your expenses?: Yes  Make your own meals?: Yes    Do your own shopping?: Yes    Previous Hospitalizations:   Any hospitalizations or ED visits within the last 12 months?: No      Advance Care Planning:   Living will: Yes    Durable POA for healthcare: Yes    Advanced directive: Yes    Advanced directive counseling given: Yes      Cognitive Screening:   Provider or family/friend/caregiver concerned regarding cognition?: No    PREVENTIVE SCREENINGS      Cardiovascular Screening:    General: Screening Not Indicated and History Lipid Disorder      Colorectal Cancer Screening:     General: Screening Current      Prostate Cancer Screening:    General: Risks and Benefits Discussed    Due for: PSA      Osteoporosis Screening:    General: Screening Not Indicated      Abdominal Aortic Aneurysm (AAA) Screening:    Risk factors include: age between 73-69 yo        Lung Cancer Screening:     General: Screening Not Indicated      Hepatitis C Screening:    General: Risks and Benefits Discussed    Hep C Screening Accepted: No     Screening, Brief Intervention, and Referral to Treatment (SBIRT)    Screening      AUDIT-C Screenin) How often did you have a drink containing alcohol in the past year? 2 to 4 times a month  2) How many drinks did you have on a typical day when you were drinking in the past year? 1 to 2  3) How often did you have 6 or more drinks on one occasion in the past year? never    AUDIT-C Score: 2  Interpretation: Score 0-3 (male): Negative screen for alcohol misuse    Single Item Drug Screening:  How often have you used an illegal drug (including marijuana) or a prescription medication for non-medical reasons in the past year? never    Single Item Drug Screen Score: 0  Interpretation: Negative screen for possible drug use disorder    BMI Counseling: Body mass index is 34 45 kg/m²   The BMI is above normal  Nutrition recommendations include reducing portion sizes, moderation in carbohydrate intake, increasing intake of lean protein, reducing intake of saturated fat and trans fat and reducing intake of cholesterol  Exercise recommendations include exercising 3-5 times per week      Humberto Mcneil MD

## 2022-03-22 NOTE — PATIENT INSTRUCTIONS
Medicare Preventive Visit Patient Instructions  Thank you for completing your Welcome to Medicare Visit or Medicare Annual Wellness Visit today  Your next wellness visit will be due in one year (3/23/2023)  The screening/preventive services that you may require over the next 5-10 years are detailed below  Some tests may not apply to you based off risk factors and/or age  Screening tests ordered at today's visit but not completed yet may show as past due  Also, please note that scanned in results may not display below  Preventive Screenings:  Service Recommendations Previous Testing/Comments   Colorectal Cancer Screening  · Colonoscopy    · Fecal Occult Blood Test (FOBT)/Fecal Immunochemical Test (FIT)  · Fecal DNA/Cologuard Test  · Flexible Sigmoidoscopy Age: 54-65 years old   Colonoscopy: every 10 years (May be performed more frequently if at higher risk)  OR  FOBT/FIT: every 1 year  OR  Cologuard: every 3 years  OR  Sigmoidoscopy: every 5 years  Screening may be recommended earlier than age 48 if at higher risk for colorectal cancer  Also, an individualized decision between you and your healthcare provider will decide whether screening between the ages of 74-80 would be appropriate   Colonoscopy: 02/24/2017  FOBT/FIT: Not on file  Cologuard: Not on file  Sigmoidoscopy: Not on file    Screening Current     Prostate Cancer Screening Individualized decision between patient and health care provider in men between ages of 53-78   Medicare will cover every 12 months beginning on the day after your 50th birthday PSA: 1 6 ng/mL           Hepatitis C Screening Once for adults born between 1945 and 1965  More frequently in patients at high risk for Hepatitis C Hep C Antibody: Not on file        Diabetes Screening 1-2 times per year if you're at risk for diabetes or have pre-diabetes Fasting glucose: 95 mg/dL   A1C: No results in last 5 years        Cholesterol Screening Once every 5 years if you don't have a lipid disorder  May order more often based on risk factors  Lipid panel: 11/06/2018    Screening Not Indicated  History Lipid Disorder      Other Preventive Screenings Covered by Medicare:  1  Abdominal Aortic Aneurysm (AAA) Screening: covered once if your at risk  You're considered to be at risk if you have a family history of AAA or a male between the age of 73-68 who smoking at least 100 cigarettes in your lifetime  2  Lung Cancer Screening: covers low dose CT scan once per year if you meet all of the following conditions: (1) Age 50-69; (2) No signs or symptoms of lung cancer; (3) Current smoker or have quit smoking within the last 15 years; (4) You have a tobacco smoking history of at least 30 pack years (packs per day x number of years you smoked); (5) You get a written order from a healthcare provider  3  Glaucoma Screening: covered annually if you're considered high risk: (1) You have diabetes OR (2) Family history of glaucoma OR (3)  aged 48 and older OR (3)  American aged 72 and older  3  Osteoporosis Screening: covered every 2 years if you meet one of the following conditions: (1) Have a vertebral abnormality; (2) On glucocorticoid therapy for more than 3 months; (3) Have primary hyperparathyroidism; (4) On osteoporosis medications and need to assess response to drug therapy  5  HIV Screening: covered annually if you're between the age of 12-76  Also covered annually if you are younger than 13 and older than 72 with risk factors for HIV infection  For pregnant patients, it is covered up to 3 times per pregnancy      Immunizations:  Immunization Recommendations   Influenza Vaccine Annual influenza vaccination during flu season is recommended for all persons aged >= 6 months who do not have contraindications   Pneumococcal Vaccine (Prevnar and Pneumovax)  * Prevnar = PCV13  * Pneumovax = PPSV23 Adults 25-60 years old: 1-3 doses may be recommended based on certain risk factors  Adults 65+ years old: Prevnar (PCV13) vaccine recommended followed by Pneumovax (PPSV23) vaccine  If already received PPSV23 since turning 65, then PCV13 recommended at least one year after PPSV23 dose  Hepatitis B Vaccine 3 dose series if at intermediate or high risk (ex: diabetes, end stage renal disease, liver disease)   Tetanus (Td) Vaccine - COST NOT COVERED BY MEDICARE PART B Following completion of primary series, a booster dose should be given every 10 years to maintain immunity against tetanus  Td may also be given as tetanus wound prophylaxis  Tdap Vaccine - COST NOT COVERED BY MEDICARE PART B Recommended at least once for all adults  For pregnant patients, recommended with each pregnancy  Shingles Vaccine (Shingrix) - COST NOT COVERED BY MEDICARE PART B  2 shot series recommended in those aged 48 and above     Health Maintenance Due:      Topic Date Due    Hepatitis C Screening  Never done    Colorectal Cancer Screening  02/24/2020     Immunizations Due:      Topic Date Due    Pneumococcal Vaccine: 65+ Years (2 of 2 - PPSV23) 09/26/2019    COVID-19 Vaccine (3 - Booster for Moderna series) 07/16/2021    Influenza Vaccine (1) 09/01/2021     Advance Directives   What are advance directives? Advance directives are legal documents that state your wishes and plans for medical care  These plans are made ahead of time in case you lose your ability to make decisions for yourself  Advance directives can apply to any medical decision, such as the treatments you want, and if you want to donate organs  What are the types of advance directives? There are many types of advance directives, and each state has rules about how to use them  You may choose a combination of any of the following:  · Living will: This is a written record of the treatment you want  You can also choose which treatments you do not want, which to limit, and which to stop at a certain time   This includes surgery, medicine, IV fluid, and tube feedings  · Durable power of  for healthcare [facility]): This is a written record that states who you want to make healthcare choices for you when you are unable to make them for yourself  This person, called a proxy, is usually a family member or a friend  You may choose more than 1 proxy  · Do not resuscitate (DNR) order:  A DNR order is used in case your heart stops beating or you stop breathing  It is a request not to have certain forms of treatment, such as CPR  A DNR order may be included in other types of advance directives  · Medical directive: This covers the care that you want if you are in a coma, near death, or unable to make decisions for yourself  You can list the treatments you want for each condition  Treatment may include pain medicine, surgery, blood transfusions, dialysis, IV or tube feedings, and a ventilator (breathing machine)  · Values history: This document has questions about your views, beliefs, and how you feel and think about life  This information can help others choose the care that you would choose  Why are advance directives important? An advance directive helps you control your care  Although spoken wishes may be used, it is better to have your wishes written down  Spoken wishes can be misunderstood, or not followed  Treatments may be given even if you do not want them  An advance directive may make it easier for your family to make difficult choices about your care  Weight Management   Why it is important to manage your weight:  Being overweight increases your risk of health conditions such as heart disease, high blood pressure, type 2 diabetes, and certain types of cancer  It can also increase your risk for osteoarthritis, sleep apnea, and other respiratory problems  Aim for a slow, steady weight loss  Even a small amount of weight loss can lower your risk of health problems    How to lose weight safely:  A safe and healthy way to lose weight is to eat fewer calories and get regular exercise  You can lose up about 1 pound a week by decreasing the number of calories you eat by 500 calories each day  Healthy meal plan for weight management:  A healthy meal plan includes a variety of foods, contains fewer calories, and helps you stay healthy  A healthy meal plan includes the following:  · Eat whole-grain foods more often  A healthy meal plan should contain fiber  Fiber is the part of grains, fruits, and vegetables that is not broken down by your body  Whole-grain foods are healthy and provide extra fiber in your diet  Some examples of whole-grain foods are whole-wheat breads and pastas, oatmeal, brown rice, and bulgur  · Eat a variety of vegetables every day  Include dark, leafy greens such as spinach, kale, kris greens, and mustard greens  Eat yellow and orange vegetables such as carrots, sweet potatoes, and winter squash  · Eat a variety of fruits every day  Choose fresh or canned fruit (canned in its own juice or light syrup) instead of juice  Fruit juice has very little or no fiber  · Eat low-fat dairy foods  Drink fat-free (skim) milk or 1% milk  Eat fat-free yogurt and low-fat cottage cheese  Try low-fat cheeses such as mozzarella and other reduced-fat cheeses  · Choose meat and other protein foods that are low in fat  Choose beans or other legumes such as split peas or lentils  Choose fish, skinless poultry (chicken or turkey), or lean cuts of red meat (beef or pork)  Before you cook meat or poultry, cut off any visible fat  · Use less fat and oil  Try baking foods instead of frying them  Add less fat, such as margarine, sour cream, regular salad dressing and mayonnaise to foods  Eat fewer high-fat foods  Some examples of high-fat foods include french fries, doughnuts, ice cream, and cakes  · Eat fewer sweets  Limit foods and drinks that are high in sugar  This includes candy, cookies, regular soda, and sweetened drinks    Exercise:  Exercise at least 30 minutes per day on most days of the week  Some examples of exercise include walking, biking, dancing, and swimming  You can also fit in more physical activity by taking the stairs instead of the elevator or parking farther away from stores  Ask your healthcare provider about the best exercise plan for you  © Copyright [employer] 2018 Information is for End User's use only and may not be sold, redistributed or otherwise used for commercial purposes  All illustrations and images included in CareNotes® are the copyrighted property of [employer]  or [facility] Preventive Visit Patient Instructions  Thank you for completing your Welcome to Medicare Visit or Medicare Annual Wellness Visit today  Your next wellness visit will be due in one year (3/23/2023)  The screening/preventive services that you may require over the next 5-10 years are detailed below  Some tests may not apply to you based off risk factors and/or age  Screening tests ordered at today's visit but not completed yet may show as past due  Also, please note that scanned in results may not display below  Preventive Screenings:  Service Recommendations Previous Testing/Comments   Colorectal Cancer Screening  · Colonoscopy    · Fecal Occult Blood Test (FOBT)/Fecal Immunochemical Test (FIT)  · Fecal DNA/Cologuard Test  · Flexible Sigmoidoscopy Age: 54-65 years old   Colonoscopy: every 10 years (May be performed more frequently if at higher risk)  OR  FOBT/FIT: every 1 year  OR  Cologuard: every 3 years  OR  Sigmoidoscopy: every 5 years  Screening may be recommended earlier than age 48 if at higher risk for colorectal cancer  Also, an individualized decision between you and your healthcare provider will decide whether screening between the ages of 74-80 would be appropriate   Colonoscopy: 02/24/2017  FOBT/FIT: Not on file  Cologuard: Not on file  Sigmoidoscopy: Not on file    Screening Current     Prostate Cancer Screening Individualized decision between patient and health care provider in men between ages of 53-78   Medicare will cover every 12 months beginning on the day after your 50th birthday PSA: 1 6 ng/mL           Hepatitis C Screening Once for adults born between 1945 and 1965  More frequently in patients at high risk for Hepatitis C Hep C Antibody: Not on file        Diabetes Screening 1-2 times per year if you're at risk for diabetes or have pre-diabetes Fasting glucose: 95 mg/dL   A1C: No results in last 5 years        Cholesterol Screening Once every 5 years if you don't have a lipid disorder  May order more often based on risk factors  Lipid panel: 11/06/2018    Screening Not Indicated  History Lipid Disorder      Other Preventive Screenings Covered by Medicare:  6  Abdominal Aortic Aneurysm (AAA) Screening: covered once if your at risk  You're considered to be at risk if you have a family history of AAA or a male between the age of 73-68 who smoking at least 100 cigarettes in your lifetime  7  Lung Cancer Screening: covers low dose CT scan once per year if you meet all of the following conditions: (1) Age 50-69; (2) No signs or symptoms of lung cancer; (3) Current smoker or have quit smoking within the last 15 years; (4) You have a tobacco smoking history of at least 30 pack years (packs per day x number of years you smoked); (5) You get a written order from a healthcare provider  8  Glaucoma Screening: covered annually if you're considered high risk: (1) You have diabetes OR (2) Family history of glaucoma OR (3)  aged 48 and older OR (3)  American aged 72 and older  5  Osteoporosis Screening: covered every 2 years if you meet one of the following conditions: (1) Have a vertebral abnormality; (2) On glucocorticoid therapy for more than 3 months; (3) Have primary hyperparathyroidism; (4) On osteoporosis medications and need to assess response to drug therapy    10  HIV Screening: covered annually if you're between the age of 12-76  Also covered annually if you are younger than 13 and older than 72 with risk factors for HIV infection  For pregnant patients, it is covered up to 3 times per pregnancy  Immunizations:  Immunization Recommendations   Influenza Vaccine Annual influenza vaccination during flu season is recommended for all persons aged >= 6 months who do not have contraindications   Pneumococcal Vaccine (Prevnar and Pneumovax)  * Prevnar = PCV13  * Pneumovax = PPSV23 Adults 25-60 years old: 1-3 doses may be recommended based on certain risk factors  Adults 72 years old: Prevnar (PCV13) vaccine recommended followed by Pneumovax (PPSV23) vaccine  If already received PPSV23 since turning 65, then PCV13 recommended at least one year after PPSV23 dose  Hepatitis B Vaccine 3 dose series if at intermediate or high risk (ex: diabetes, end stage renal disease, liver disease)   Tetanus (Td) Vaccine - COST NOT COVERED BY MEDICARE PART B Following completion of primary series, a booster dose should be given every 10 years to maintain immunity against tetanus  Td may also be given as tetanus wound prophylaxis  Tdap Vaccine - COST NOT COVERED BY MEDICARE PART B Recommended at least once for all adults  For pregnant patients, recommended with each pregnancy  Shingles Vaccine (Shingrix) - COST NOT COVERED BY MEDICARE PART B  2 shot series recommended in those aged 48 and above     Health Maintenance Due:      Topic Date Due    Hepatitis C Screening  Never done    Colorectal Cancer Screening  02/24/2020     Immunizations Due:      Topic Date Due    Pneumococcal Vaccine: 65+ Years (2 of 2 - PPSV23) 09/26/2019    COVID-19 Vaccine (3 - Booster for Moderna series) 07/16/2021    Influenza Vaccine (1) 09/01/2021     Advance Directives   What are advance directives? Advance directives are legal documents that state your wishes and plans for medical care   These plans are made ahead of time in case you lose your ability to make decisions for yourself  Advance directives can apply to any medical decision, such as the treatments you want, and if you want to donate organs  What are the types of advance directives? There are many types of advance directives, and each state has rules about how to use them  You may choose a combination of any of the following:  · Living will: This is a written record of the treatment you want  You can also choose which treatments you do not want, which to limit, and which to stop at a certain time  This includes surgery, medicine, IV fluid, and tube feedings  · Durable power of  for healthcare [facility]): This is a written record that states who you want to make healthcare choices for you when you are unable to make them for yourself  This person, called a proxy, is usually a family member or a friend  You may choose more than 1 proxy  · Do not resuscitate (DNR) order:  A DNR order is used in case your heart stops beating or you stop breathing  It is a request not to have certain forms of treatment, such as CPR  A DNR order may be included in other types of advance directives  · Medical directive: This covers the care that you want if you are in a coma, near death, or unable to make decisions for yourself  You can list the treatments you want for each condition  Treatment may include pain medicine, surgery, blood transfusions, dialysis, IV or tube feedings, and a ventilator (breathing machine)  · Values history: This document has questions about your views, beliefs, and how you feel and think about life  This information can help others choose the care that you would choose  Why are advance directives important? An advance directive helps you control your care  Although spoken wishes may be used, it is better to have your wishes written down  Spoken wishes can be misunderstood, or not followed  Treatments may be given even if you do not want them   An advance directive may make it easier for your family to make difficult choices about your care  Weight Management   Why it is important to manage your weight:  Being overweight increases your risk of health conditions such as heart disease, high blood pressure, type 2 diabetes, and certain types of cancer  It can also increase your risk for osteoarthritis, sleep apnea, and other respiratory problems  Aim for a slow, steady weight loss  Even a small amount of weight loss can lower your risk of health problems  How to lose weight safely:  A safe and healthy way to lose weight is to eat fewer calories and get regular exercise  You can lose up about 1 pound a week by decreasing the number of calories you eat by 500 calories each day  Healthy meal plan for weight management:  A healthy meal plan includes a variety of foods, contains fewer calories, and helps you stay healthy  A healthy meal plan includes the following:  · Eat whole-grain foods more often  A healthy meal plan should contain fiber  Fiber is the part of grains, fruits, and vegetables that is not broken down by your body  Whole-grain foods are healthy and provide extra fiber in your diet  Some examples of whole-grain foods are whole-wheat breads and pastas, oatmeal, brown rice, and bulgur  · Eat a variety of vegetables every day  Include dark, leafy greens such as spinach, kale, kris greens, and mustard greens  Eat yellow and orange vegetables such as carrots, sweet potatoes, and winter squash  · Eat a variety of fruits every day  Choose fresh or canned fruit (canned in its own juice or light syrup) instead of juice  Fruit juice has very little or no fiber  · Eat low-fat dairy foods  Drink fat-free (skim) milk or 1% milk  Eat fat-free yogurt and low-fat cottage cheese  Try low-fat cheeses such as mozzarella and other reduced-fat cheeses  · Choose meat and other protein foods that are low in fat  Choose beans or other legumes such as split peas or lentils   Choose fish, skinless poultry (chicken or turkey), or lean cuts of red meat (beef or pork)  Before you cook meat or poultry, cut off any visible fat  · Use less fat and oil  Try baking foods instead of frying them  Add less fat, such as margarine, sour cream, regular salad dressing and mayonnaise to foods  Eat fewer high-fat foods  Some examples of high-fat foods include french fries, doughnuts, ice cream, and cakes  · Eat fewer sweets  Limit foods and drinks that are high in sugar  This includes candy, cookies, regular soda, and sweetened drinks  Exercise:  Exercise at least 30 minutes per day on most days of the week  Some examples of exercise include walking, biking, dancing, and swimming  You can also fit in more physical activity by taking the stairs instead of the elevator or parking farther away from stores  Ask your healthcare provider about the best exercise plan for you  © Copyright [employer] 2018 Information is for End User's use only and may not be sold, redistributed or otherwise used for commercial purposes  All illustrations and images included in CareNotes® are the copyrighted property of [employer]  or [facility] Preventive Visit Patient Instructions  Thank you for completing your Welcome to Medicare Visit or Medicare Annual Wellness Visit today  Your next wellness visit will be due in one year (3/23/2023)  The screening/preventive services that you may require over the next 5-10 years are detailed below  Some tests may not apply to you based off risk factors and/or age  Screening tests ordered at today's visit but not completed yet may show as past due  Also, please note that scanned in results may not display below    Preventive Screenings:  Service Recommendations Previous Testing/Comments   Colorectal Cancer Screening  · Colonoscopy    · Fecal Occult Blood Test (FOBT)/Fecal Immunochemical Test (FIT)  · Fecal DNA/Cologuard Test  · Flexible Sigmoidoscopy Age: 54-65 years old   Colonoscopy: every 10 years (May be performed more frequently if at higher risk)  OR  FOBT/FIT: every 1 year  OR  Cologuard: every 3 years  OR  Sigmoidoscopy: every 5 years  Screening may be recommended earlier than age 48 if at higher risk for colorectal cancer  Also, an individualized decision between you and your healthcare provider will decide whether screening between the ages of 74-80 would be appropriate  Colonoscopy: 02/24/2017  FOBT/FIT: Not on file  Cologuard: Not on file  Sigmoidoscopy: Not on file    Screening Current     Prostate Cancer Screening Individualized decision between patient and health care provider in men between ages of 53-78   Medicare will cover every 12 months beginning on the day after your 50th birthday PSA: 1 6 ng/mL           Hepatitis C Screening Once for adults born between 1945 and 1965  More frequently in patients at high risk for Hepatitis C Hep C Antibody: Not on file        Diabetes Screening 1-2 times per year if you're at risk for diabetes or have pre-diabetes Fasting glucose: 95 mg/dL   A1C: No results in last 5 years        Cholesterol Screening Once every 5 years if you don't have a lipid disorder  May order more often based on risk factors  Lipid panel: 11/06/2018    Screening Not Indicated  History Lipid Disorder      Other Preventive Screenings Covered by Medicare:  11  Abdominal Aortic Aneurysm (AAA) Screening: covered once if your at risk  You're considered to be at risk if you have a family history of AAA or a male between the age of 73-68 who smoking at least 100 cigarettes in your lifetime    12  Lung Cancer Screening: covers low dose CT scan once per year if you meet all of the following conditions: (1) Age 50-69; (2) No signs or symptoms of lung cancer; (3) Current smoker or have quit smoking within the last 15 years; (4) You have a tobacco smoking history of at least 30 pack years (packs per day x number of years you smoked); (5) You get a written order from a healthcare provider  15  Glaucoma Screening: covered annually if you're considered high risk: (1) You have diabetes OR (2) Family history of glaucoma OR (3)  aged 48 and older OR (3)  American aged 72 and older  15  Osteoporosis Screening: covered every 2 years if you meet one of the following conditions: (1) Have a vertebral abnormality; (2) On glucocorticoid therapy for more than 3 months; (3) Have primary hyperparathyroidism; (4) On osteoporosis medications and need to assess response to drug therapy  15  HIV Screening: covered annually if you're between the age of 12-76  Also covered annually if you are younger than 13 and older than 72 with risk factors for HIV infection  For pregnant patients, it is covered up to 3 times per pregnancy  Immunizations:  Immunization Recommendations   Influenza Vaccine Annual influenza vaccination during flu season is recommended for all persons aged >= 6 months who do not have contraindications   Pneumococcal Vaccine (Prevnar and Pneumovax)  * Prevnar = PCV13  * Pneumovax = PPSV23 Adults 25-60 years old: 1-3 doses may be recommended based on certain risk factors  Adults 72 years old: Prevnar (PCV13) vaccine recommended followed by Pneumovax (PPSV23) vaccine  If already received PPSV23 since turning 65, then PCV13 recommended at least one year after PPSV23 dose  Hepatitis B Vaccine 3 dose series if at intermediate or high risk (ex: diabetes, end stage renal disease, liver disease)   Tetanus (Td) Vaccine - COST NOT COVERED BY MEDICARE PART B Following completion of primary series, a booster dose should be given every 10 years to maintain immunity against tetanus  Td may also be given as tetanus wound prophylaxis  Tdap Vaccine - COST NOT COVERED BY MEDICARE PART B Recommended at least once for all adults  For pregnant patients, recommended with each pregnancy     Shingles Vaccine (Shingrix) - COST NOT COVERED BY MEDICARE PART B  2 shot series recommended in those aged 48 and above     Health Maintenance Due:      Topic Date Due    Hepatitis C Screening  Never done    Colorectal Cancer Screening  02/24/2020     Immunizations Due:      Topic Date Due    Pneumococcal Vaccine: 65+ Years (2 of 2 - PPSV23) 09/26/2019    COVID-19 Vaccine (3 - Booster for Moderna series) 07/16/2021    Influenza Vaccine (1) 09/01/2021     Advance Directives   What are advance directives? Advance directives are legal documents that state your wishes and plans for medical care  These plans are made ahead of time in case you lose your ability to make decisions for yourself  Advance directives can apply to any medical decision, such as the treatments you want, and if you want to donate organs  What are the types of advance directives? There are many types of advance directives, and each state has rules about how to use them  You may choose a combination of any of the following:  · Living will: This is a written record of the treatment you want  You can also choose which treatments you do not want, which to limit, and which to stop at a certain time  This includes surgery, medicine, IV fluid, and tube feedings  · Durable power of  for healthcare [facility]): This is a written record that states who you want to make healthcare choices for you when you are unable to make them for yourself  This person, called a proxy, is usually a family member or a friend  You may choose more than 1 proxy  · Do not resuscitate (DNR) order:  A DNR order is used in case your heart stops beating or you stop breathing  It is a request not to have certain forms of treatment, such as CPR  A DNR order may be included in other types of advance directives  · Medical directive: This covers the care that you want if you are in a coma, near death, or unable to make decisions for yourself  You can list the treatments you want for each condition   Treatment may include pain medicine, surgery, blood transfusions, dialysis, IV or tube feedings, and a ventilator (breathing machine)  · Values history: This document has questions about your views, beliefs, and how you feel and think about life  This information can help others choose the care that you would choose  Why are advance directives important? An advance directive helps you control your care  Although spoken wishes may be used, it is better to have your wishes written down  Spoken wishes can be misunderstood, or not followed  Treatments may be given even if you do not want them  An advance directive may make it easier for your family to make difficult choices about your care  Weight Management   Why it is important to manage your weight:  Being overweight increases your risk of health conditions such as heart disease, high blood pressure, type 2 diabetes, and certain types of cancer  It can also increase your risk for osteoarthritis, sleep apnea, and other respiratory problems  Aim for a slow, steady weight loss  Even a small amount of weight loss can lower your risk of health problems  How to lose weight safely:  A safe and healthy way to lose weight is to eat fewer calories and get regular exercise  You can lose up about 1 pound a week by decreasing the number of calories you eat by 500 calories each day  Healthy meal plan for weight management:  A healthy meal plan includes a variety of foods, contains fewer calories, and helps you stay healthy  A healthy meal plan includes the following:  · Eat whole-grain foods more often  A healthy meal plan should contain fiber  Fiber is the part of grains, fruits, and vegetables that is not broken down by your body  Whole-grain foods are healthy and provide extra fiber in your diet  Some examples of whole-grain foods are whole-wheat breads and pastas, oatmeal, brown rice, and bulgur  · Eat a variety of vegetables every day    Include dark, leafy greens such as spinach, kale, kris greens, and mustard greens  Eat yellow and orange vegetables such as carrots, sweet potatoes, and winter squash  · Eat a variety of fruits every day  Choose fresh or canned fruit (canned in its own juice or light syrup) instead of juice  Fruit juice has very little or no fiber  · Eat low-fat dairy foods  Drink fat-free (skim) milk or 1% milk  Eat fat-free yogurt and low-fat cottage cheese  Try low-fat cheeses such as mozzarella and other reduced-fat cheeses  · Choose meat and other protein foods that are low in fat  Choose beans or other legumes such as split peas or lentils  Choose fish, skinless poultry (chicken or turkey), or lean cuts of red meat (beef or pork)  Before you cook meat or poultry, cut off any visible fat  · Use less fat and oil  Try baking foods instead of frying them  Add less fat, such as margarine, sour cream, regular salad dressing and mayonnaise to foods  Eat fewer high-fat foods  Some examples of high-fat foods include french fries, doughnuts, ice cream, and cakes  · Eat fewer sweets  Limit foods and drinks that are high in sugar  This includes candy, cookies, regular soda, and sweetened drinks  Exercise:  Exercise at least 30 minutes per day on most days of the week  Some examples of exercise include walking, biking, dancing, and swimming  You can also fit in more physical activity by taking the stairs instead of the elevator or parking farther away from stores  Ask your healthcare provider about the best exercise plan for you  © Copyright [employer] 2018 Information is for End User's use only and may not be sold, redistributed or otherwise used for commercial purposes   All illustrations and images included in CareNotes® are the copyrighted property of A D A M , [employer]  or [address] [place] [address] [employer]

## 2022-03-22 NOTE — ASSESSMENT & PLAN NOTE
Overdue for labs  Continue present care   Check labs - adjust meds/treatment if not at goal  Recheck 6m

## 2022-03-23 LAB
EST. AVERAGE GLUCOSE BLD GHB EST-MCNC: 123 MG/DL
HBA1C MFR BLD: 5.9 %

## 2022-04-07 ENCOUNTER — APPOINTMENT (OUTPATIENT)
Dept: LAB | Facility: CLINIC | Age: 67
End: 2022-04-07
Payer: MEDICARE

## 2022-04-07 DIAGNOSIS — I10 BENIGN ESSENTIAL HYPERTENSION: ICD-10-CM

## 2022-04-07 LAB
BASOPHILS # BLD AUTO: 0.09 THOUSANDS/ΜL (ref 0–0.1)
BASOPHILS NFR BLD AUTO: 1 % (ref 0–1)
EOSINOPHIL # BLD AUTO: 0.29 THOUSAND/ΜL (ref 0–0.61)
EOSINOPHIL NFR BLD AUTO: 4 % (ref 0–6)
ERYTHROCYTE [DISTWIDTH] IN BLOOD BY AUTOMATED COUNT: 12.8 % (ref 11.6–15.1)
HCT VFR BLD AUTO: 49.8 % (ref 36.5–49.3)
HGB BLD-MCNC: 16.5 G/DL (ref 12–17)
IMM GRANULOCYTES # BLD AUTO: 0.04 THOUSAND/UL (ref 0–0.2)
IMM GRANULOCYTES NFR BLD AUTO: 1 % (ref 0–2)
LYMPHOCYTES # BLD AUTO: 1.41 THOUSANDS/ΜL (ref 0.6–4.47)
LYMPHOCYTES NFR BLD AUTO: 19 % (ref 14–44)
MCH RBC QN AUTO: 29.9 PG (ref 26.8–34.3)
MCHC RBC AUTO-ENTMCNC: 33.1 G/DL (ref 31.4–37.4)
MCV RBC AUTO: 90 FL (ref 82–98)
MONOCYTES # BLD AUTO: 0.66 THOUSAND/ΜL (ref 0.17–1.22)
MONOCYTES NFR BLD AUTO: 9 % (ref 4–12)
NEUTROPHILS # BLD AUTO: 5.06 THOUSANDS/ΜL (ref 1.85–7.62)
NEUTS SEG NFR BLD AUTO: 66 % (ref 43–75)
NRBC BLD AUTO-RTO: 0 /100 WBCS
PLATELET # BLD AUTO: 262 THOUSANDS/UL (ref 149–390)
PMV BLD AUTO: 10.7 FL (ref 8.9–12.7)
RBC # BLD AUTO: 5.51 MILLION/UL (ref 3.88–5.62)
WBC # BLD AUTO: 7.55 THOUSAND/UL (ref 4.31–10.16)

## 2022-04-07 PROCEDURE — 36415 COLL VENOUS BLD VENIPUNCTURE: CPT

## 2022-04-07 PROCEDURE — 85025 COMPLETE CBC W/AUTO DIFF WBC: CPT

## 2022-05-08 ENCOUNTER — APPOINTMENT (EMERGENCY)
Dept: CT IMAGING | Facility: HOSPITAL | Age: 67
DRG: 064 | End: 2022-05-08
Payer: MEDICARE

## 2022-05-08 ENCOUNTER — HOSPITAL ENCOUNTER (INPATIENT)
Facility: HOSPITAL | Age: 67
LOS: 9 days | DRG: 064 | End: 2022-05-17
Attending: EMERGENCY MEDICINE | Admitting: INTERNAL MEDICINE
Payer: MEDICARE

## 2022-05-08 ENCOUNTER — APPOINTMENT (EMERGENCY)
Dept: RADIOLOGY | Facility: HOSPITAL | Age: 67
DRG: 064 | End: 2022-05-08
Payer: MEDICARE

## 2022-05-08 DIAGNOSIS — I63.9 ISCHEMIC STROKE (HCC): ICD-10-CM

## 2022-05-08 DIAGNOSIS — R29.90 STROKE-LIKE EPISODE: ICD-10-CM

## 2022-05-08 DIAGNOSIS — I77.71 DISSECTION OF CAROTID ARTERY (HCC): ICD-10-CM

## 2022-05-08 DIAGNOSIS — R29.90 STROKE-LIKE SYMPTOMS: ICD-10-CM

## 2022-05-08 DIAGNOSIS — R53.1 WEAKNESS: Primary | ICD-10-CM

## 2022-05-08 DIAGNOSIS — R32 URINARY INCONTINENCE: ICD-10-CM

## 2022-05-08 LAB
ANION GAP SERPL CALCULATED.3IONS-SCNC: 8 MMOL/L (ref 4–13)
APAP SERPL-MCNC: <10 UG/ML (ref 10–20)
APTT PPP: 32 SECONDS (ref 23–37)
BUN SERPL-MCNC: 14 MG/DL (ref 5–25)
CALCIUM SERPL-MCNC: 9.3 MG/DL (ref 8.4–10.2)
CARDIAC TROPONIN I PNL SERPL HS: 3 NG/L
CHLORIDE SERPL-SCNC: 108 MMOL/L (ref 96–108)
CO2 SERPL-SCNC: 23 MMOL/L (ref 21–32)
CREAT SERPL-MCNC: 0.93 MG/DL (ref 0.6–1.3)
ERYTHROCYTE [DISTWIDTH] IN BLOOD BY AUTOMATED COUNT: 13 % (ref 11.6–15.1)
ETHANOL SERPL-MCNC: <10 MG/DL
FLUAV RNA RESP QL NAA+PROBE: NEGATIVE
FLUBV RNA RESP QL NAA+PROBE: NEGATIVE
GFR SERPL CREATININE-BSD FRML MDRD: 84 ML/MIN/1.73SQ M
GLUCOSE SERPL-MCNC: 191 MG/DL (ref 65–140)
GLUCOSE SERPL-MCNC: 198 MG/DL (ref 65–140)
HCT VFR BLD AUTO: 46.4 % (ref 36.5–49.3)
HGB BLD-MCNC: 15.7 G/DL (ref 12–17)
INR PPP: 0.87 (ref 0.84–1.19)
MCH RBC QN AUTO: 30.4 PG (ref 26.8–34.3)
MCHC RBC AUTO-ENTMCNC: 33.8 G/DL (ref 31.4–37.4)
MCV RBC AUTO: 90 FL (ref 82–98)
PLATELET # BLD AUTO: 248 THOUSANDS/UL (ref 149–390)
PMV BLD AUTO: 10.3 FL (ref 8.9–12.7)
POTASSIUM SERPL-SCNC: 4.3 MMOL/L (ref 3.5–5.3)
PROTHROMBIN TIME: 11.9 SECONDS (ref 11.6–14.5)
RBC # BLD AUTO: 5.17 MILLION/UL (ref 3.88–5.62)
RSV RNA RESP QL NAA+PROBE: NEGATIVE
SALICYLATES SERPL-MCNC: <5 MG/DL (ref 3–20)
SARS-COV-2 RNA RESP QL NAA+PROBE: NEGATIVE
SODIUM SERPL-SCNC: 139 MMOL/L (ref 135–147)
WBC # BLD AUTO: 8.55 THOUSAND/UL (ref 4.31–10.16)

## 2022-05-08 PROCEDURE — 85730 THROMBOPLASTIN TIME PARTIAL: CPT | Performed by: EMERGENCY MEDICINE

## 2022-05-08 PROCEDURE — 80061 LIPID PANEL: CPT | Performed by: NURSE PRACTITIONER

## 2022-05-08 PROCEDURE — 85610 PROTHROMBIN TIME: CPT | Performed by: EMERGENCY MEDICINE

## 2022-05-08 PROCEDURE — 0241U HB NFCT DS VIR RESP RNA 4 TRGT: CPT | Performed by: EMERGENCY MEDICINE

## 2022-05-08 PROCEDURE — 93005 ELECTROCARDIOGRAM TRACING: CPT

## 2022-05-08 PROCEDURE — 82948 REAGENT STRIP/BLOOD GLUCOSE: CPT

## 2022-05-08 PROCEDURE — 84484 ASSAY OF TROPONIN QUANT: CPT | Performed by: EMERGENCY MEDICINE

## 2022-05-08 PROCEDURE — 80143 DRUG ASSAY ACETAMINOPHEN: CPT | Performed by: EMERGENCY MEDICINE

## 2022-05-08 PROCEDURE — 99285 EMERGENCY DEPT VISIT HI MDM: CPT | Performed by: EMERGENCY MEDICINE

## 2022-05-08 PROCEDURE — 83036 HEMOGLOBIN GLYCOSYLATED A1C: CPT | Performed by: NURSE PRACTITIONER

## 2022-05-08 PROCEDURE — 36415 COLL VENOUS BLD VENIPUNCTURE: CPT | Performed by: EMERGENCY MEDICINE

## 2022-05-08 PROCEDURE — 99285 EMERGENCY DEPT VISIT HI MDM: CPT

## 2022-05-08 PROCEDURE — 82077 ASSAY SPEC XCP UR&BREATH IA: CPT | Performed by: EMERGENCY MEDICINE

## 2022-05-08 PROCEDURE — 71045 X-RAY EXAM CHEST 1 VIEW: CPT

## 2022-05-08 PROCEDURE — 85027 COMPLETE CBC AUTOMATED: CPT | Performed by: EMERGENCY MEDICINE

## 2022-05-08 PROCEDURE — 80179 DRUG ASSAY SALICYLATE: CPT | Performed by: EMERGENCY MEDICINE

## 2022-05-08 PROCEDURE — 80048 BASIC METABOLIC PNL TOTAL CA: CPT | Performed by: EMERGENCY MEDICINE

## 2022-05-08 RX ORDER — ASPIRIN 325 MG
325 TABLET ORAL ONCE
Status: COMPLETED | OUTPATIENT
Start: 2022-05-08 | End: 2022-05-09

## 2022-05-09 ENCOUNTER — APPOINTMENT (INPATIENT)
Dept: MRI IMAGING | Facility: HOSPITAL | Age: 67
DRG: 064 | End: 2022-05-09
Payer: MEDICARE

## 2022-05-09 ENCOUNTER — APPOINTMENT (INPATIENT)
Dept: NON INVASIVE DIAGNOSTICS | Facility: HOSPITAL | Age: 67
DRG: 064 | End: 2022-05-09
Payer: MEDICARE

## 2022-05-09 PROBLEM — R29.90 STROKE-LIKE SYMPTOMS: Status: ACTIVE | Noted: 2022-05-09

## 2022-05-09 PROBLEM — R27.0 ATAXIA: Status: ACTIVE | Noted: 2022-05-09

## 2022-05-09 LAB
2HR DELTA HS TROPONIN: 0 NG/L
4HR DELTA HS TROPONIN: 1 NG/L
ANION GAP SERPL CALCULATED.3IONS-SCNC: 7 MMOL/L (ref 4–13)
AORTIC ROOT: 3.7 CM
APICAL FOUR CHAMBER EJECTION FRACTION: 61 %
ASCENDING AORTA: 3.5 CM (ref 2.24–3.35)
ATRIAL RATE: 71 BPM
AV LVOT PEAK GRADIENT: 4 MMHG
AV PEAK GRADIENT: 8 MMHG
BASOPHILS # BLD AUTO: 0.06 THOUSANDS/ΜL (ref 0–0.1)
BASOPHILS NFR BLD AUTO: 1 % (ref 0–1)
BUN SERPL-MCNC: 12 MG/DL (ref 5–25)
CALCIUM SERPL-MCNC: 9.3 MG/DL (ref 8.4–10.2)
CARDIAC TROPONIN I PNL SERPL HS: 3 NG/L
CARDIAC TROPONIN I PNL SERPL HS: 4 NG/L
CHLORIDE SERPL-SCNC: 109 MMOL/L (ref 96–108)
CHOLEST SERPL-MCNC: 206 MG/DL
CO2 SERPL-SCNC: 24 MMOL/L (ref 21–32)
CREAT SERPL-MCNC: 0.87 MG/DL (ref 0.6–1.3)
DOP CALC RVOT PEAK VEL: 0.83 M/S
E WAVE DECELERATION TIME: 222 MS
EOSINOPHIL # BLD AUTO: 0.1 THOUSAND/ΜL (ref 0–0.61)
EOSINOPHIL NFR BLD AUTO: 1 % (ref 0–6)
ERYTHROCYTE [DISTWIDTH] IN BLOOD BY AUTOMATED COUNT: 13 % (ref 11.6–15.1)
EST. AVERAGE GLUCOSE BLD GHB EST-MCNC: 123 MG/DL
FRACTIONAL SHORTENING: 32 % (ref 28–44)
GFR SERPL CREATININE-BSD FRML MDRD: 88 ML/MIN/1.73SQ M
GLUCOSE SERPL-MCNC: 134 MG/DL (ref 65–140)
HBA1C MFR BLD: 5.9 %
HCT VFR BLD AUTO: 46.6 % (ref 36.5–49.3)
HDLC SERPL-MCNC: 32 MG/DL
HGB BLD-MCNC: 15.3 G/DL (ref 12–17)
IMM GRANULOCYTES # BLD AUTO: 0.05 THOUSAND/UL (ref 0–0.2)
IMM GRANULOCYTES NFR BLD AUTO: 1 % (ref 0–2)
INTERVENTRICULAR SEPTUM IN DIASTOLE (PARASTERNAL SHORT AXIS VIEW): 1 CM
INTERVENTRICULAR SEPTUM: 1 CM (ref 0.58–1.08)
LDLC SERPL CALC-MCNC: 141 MG/DL (ref 0–100)
LEFT ATRIUM AREA SYSTOLE SINGLE PLANE A4C: 19.4 CM2
LEFT ATRIUM SIZE: 3.8 CM
LEFT INTERNAL DIMENSION IN SYSTOLE: 3.2 CM (ref 5.81–8.81)
LEFT VENTRICULAR INTERNAL DIMENSION IN DIASTOLE: 4.7 CM (ref 9.82–14.64)
LEFT VENTRICULAR POSTERIOR WALL IN END DIASTOLE: 1 CM (ref 0.56–1.07)
LEFT VENTRICULAR STROKE VOLUME: 60 ML
LVSV (TEICH): 60 ML
LYMPHOCYTES # BLD AUTO: 1.22 THOUSANDS/ΜL (ref 0.6–4.47)
LYMPHOCYTES NFR BLD AUTO: 14 % (ref 14–44)
MCH RBC QN AUTO: 29.8 PG (ref 26.8–34.3)
MCHC RBC AUTO-ENTMCNC: 32.8 G/DL (ref 31.4–37.4)
MCV RBC AUTO: 91 FL (ref 82–98)
MONOCYTES # BLD AUTO: 0.63 THOUSAND/ΜL (ref 0.17–1.22)
MONOCYTES NFR BLD AUTO: 7 % (ref 4–12)
MV E'TISSUE VEL-SEP: 7 CM/S
MV PEAK A VEL: 0.93 M/S
MV PEAK E VEL: 57 CM/S
MV STENOSIS PRESSURE HALF TIME: 64 MS
MV VALVE AREA P 1/2 METHOD: 3.44 CM2
NEUTROPHILS # BLD AUTO: 6.93 THOUSANDS/ΜL (ref 1.85–7.62)
NEUTS SEG NFR BLD AUTO: 76 % (ref 43–75)
NRBC BLD AUTO-RTO: 0 /100 WBCS
P AXIS: 58 DEGREES
PLATELET # BLD AUTO: 245 THOUSANDS/UL (ref 149–390)
PMV BLD AUTO: 10.7 FL (ref 8.9–12.7)
POTASSIUM SERPL-SCNC: 3.9 MMOL/L (ref 3.5–5.3)
PR INTERVAL: 204 MS
PV PEAK GRADIENT: 4 MMHG
QRS AXIS: 78 DEGREES
QRSD INTERVAL: 76 MS
QT INTERVAL: 398 MS
QTC INTERVAL: 424 MS
RBC # BLD AUTO: 5.13 MILLION/UL (ref 3.88–5.62)
RIGHT ATRIUM AREA SYSTOLE A4C: 18 CM2
RIGHT VENTRICLE ID DIMENSION: 4.8 CM
SL CV LV EF: 65
SL CV PED ECHO LEFT VENTRICLE DIASTOLIC VOLUME (MOD BIPLANE) 2D: 101 ML
SL CV PED ECHO LEFT VENTRICLE SYSTOLIC VOLUME (MOD BIPLANE) 2D: 41 ML
SL CV RVOT MAX GRADIENT: 3 MMHG
SODIUM SERPL-SCNC: 140 MMOL/L (ref 135–147)
T WAVE AXIS: 47 DEGREES
TR MAX PG: 15 MMHG
TR PEAK VELOCITY: 1.9 M/S
TRICUSPID VALVE PEAK REGURGITATION VELOCITY: 1.92 M/S
TRIGL SERPL-MCNC: 163 MG/DL
VENTRICULAR RATE: 68 BPM
WBC # BLD AUTO: 8.99 THOUSAND/UL (ref 4.31–10.16)
Z-SCORE OF ASCENDING AORTA: 2.5
Z-SCORE OF INTERVENTRICULAR SEPTUM IN END DIASTOLE: 1.33
Z-SCORE OF LEFT VENTRICULAR DIMENSION IN END DIASTOLE: -9.37
Z-SCORE OF LEFT VENTRICULAR DIMENSION IN END SYSTOLE: -6.36
Z-SCORE OF LEFT VENTRICULAR POSTERIOR WALL IN END DIASTOLE: 1.44

## 2022-05-09 PROCEDURE — 99223 1ST HOSP IP/OBS HIGH 75: CPT | Performed by: PSYCHIATRY & NEUROLOGY

## 2022-05-09 PROCEDURE — 84484 ASSAY OF TROPONIN QUANT: CPT | Performed by: EMERGENCY MEDICINE

## 2022-05-09 PROCEDURE — 70547 MR ANGIOGRAPHY NECK W/O DYE: CPT

## 2022-05-09 PROCEDURE — 70551 MRI BRAIN STEM W/O DYE: CPT

## 2022-05-09 PROCEDURE — 93306 TTE W/DOPPLER COMPLETE: CPT | Performed by: INTERNAL MEDICINE

## 2022-05-09 PROCEDURE — 85025 COMPLETE CBC W/AUTO DIFF WBC: CPT | Performed by: NURSE PRACTITIONER

## 2022-05-09 PROCEDURE — 36415 COLL VENOUS BLD VENIPUNCTURE: CPT | Performed by: EMERGENCY MEDICINE

## 2022-05-09 PROCEDURE — 99222 1ST HOSP IP/OBS MODERATE 55: CPT | Performed by: GENERAL PRACTICE

## 2022-05-09 PROCEDURE — G1004 CDSM NDSC: HCPCS

## 2022-05-09 PROCEDURE — 93306 TTE W/DOPPLER COMPLETE: CPT

## 2022-05-09 PROCEDURE — NC001 PR NO CHARGE: Performed by: PSYCHIATRY & NEUROLOGY

## 2022-05-09 PROCEDURE — 80048 BASIC METABOLIC PNL TOTAL CA: CPT | Performed by: NURSE PRACTITIONER

## 2022-05-09 PROCEDURE — 93010 ELECTROCARDIOGRAM REPORT: CPT | Performed by: INTERNAL MEDICINE

## 2022-05-09 PROCEDURE — 97163 PT EVAL HIGH COMPLEX 45 MIN: CPT

## 2022-05-09 PROCEDURE — 97116 GAIT TRAINING THERAPY: CPT

## 2022-05-09 PROCEDURE — 97166 OT EVAL MOD COMPLEX 45 MIN: CPT

## 2022-05-09 PROCEDURE — 70544 MR ANGIOGRAPHY HEAD W/O DYE: CPT

## 2022-05-09 RX ORDER — ATORVASTATIN CALCIUM 40 MG/1
40 TABLET, FILM COATED ORAL EVERY EVENING
Status: DISCONTINUED | OUTPATIENT
Start: 2022-05-09 | End: 2022-05-17 | Stop reason: HOSPADM

## 2022-05-09 RX ORDER — CLOPIDOGREL BISULFATE 75 MG/1
75 TABLET ORAL DAILY
Status: DISCONTINUED | OUTPATIENT
Start: 2022-05-10 | End: 2022-05-13

## 2022-05-09 RX ORDER — ASPIRIN 81 MG/1
81 TABLET, CHEWABLE ORAL DAILY
Status: DISCONTINUED | OUTPATIENT
Start: 2022-05-09 | End: 2022-05-13

## 2022-05-09 RX ORDER — FENOFIBRATE 48 MG/1
48 TABLET, COATED ORAL DAILY
Status: DISCONTINUED | OUTPATIENT
Start: 2022-05-09 | End: 2022-05-17 | Stop reason: HOSPADM

## 2022-05-09 RX ORDER — CLOPIDOGREL BISULFATE 75 MG/1
300 TABLET ORAL ONCE
Status: COMPLETED | OUTPATIENT
Start: 2022-05-09 | End: 2022-05-09

## 2022-05-09 RX ORDER — ENOXAPARIN SODIUM 100 MG/ML
40 INJECTION SUBCUTANEOUS DAILY
Status: DISCONTINUED | OUTPATIENT
Start: 2022-05-09 | End: 2022-05-13

## 2022-05-09 RX ADMIN — FENOFIBRATE 48 MG: 48 TABLET, FILM COATED ORAL at 11:36

## 2022-05-09 RX ADMIN — ASPIRIN 81 MG CHEWABLE TABLET 81 MG: 81 TABLET CHEWABLE at 11:36

## 2022-05-09 RX ADMIN — ENOXAPARIN SODIUM 40 MG: 40 INJECTION SUBCUTANEOUS at 11:36

## 2022-05-09 RX ADMIN — ASPIRIN 325 MG ORAL TABLET 325 MG: 325 PILL ORAL at 00:06

## 2022-05-09 RX ADMIN — ATORVASTATIN CALCIUM 40 MG: 40 TABLET, FILM COATED ORAL at 17:32

## 2022-05-09 RX ADMIN — CLOPIDOGREL BISULFATE 300 MG: 75 TABLET ORAL at 05:43

## 2022-05-09 NOTE — QUICK NOTE
Stroke Alert Note   Charo Schwab  74 y o  male  MRN: [id number]   Unit/Bed#: ED 26 Encounter: [id number]     Stroke alert text received at: 10:38pm  Call from  received at: 10:40pm  Neurology response by phone was immediate    66M with HLD and borderline DM was sent to the ED from urgent care after he had presented there due to concern about multiple symptoms he had had this evening  Around 7pm he had a transient episode of dizziness/lightheadedness, and around 9pm, he stumbled while going upstairs, causing him to fall onto his knees, which he thinks was due to difficulty lifting his left leg  At urgent care he was found to have left-sided "ataxia" and was sent to the ED for further evaluation  In the ED, his BP was 169/80 when he arrived  Exam per ED attending revealed mild drift in the LUE with no weakness on motor testing and no ataxia or any other abnormalities on exam   He was fully oriented, answered questions appropriately, and followed commands, but seemed to be a little confused at times, eg after his gait was assessed and he was told to go back to the bed, he started leaving his room for another bed in the arguello  NIHSSS 1 (LUE drift)    CT head wo: concern for hyperdense right MCA in the area of the M1 bifurcation, which could represent acute thrombus vs calcification vs artifact  CTA head/neck: not completed due to contrast shortage and his not being a candidate for thrombectomy due to low NIHSS    IV tPA was not given due to minimal and nondisabling symptoms with low NIHSS, and pt being unaware of when his only current abnormal exam finding began  - Admit to stroke pathway  Should he develop significant new stroke symptoms, another stroke alert should be called and CTA can be reconsidered at that time   - Recommend loading with aspirin 325mg once, then continuing 81 mg daily, and with plavix 300mg once, followed by 75mg daily    - start lipitor 40mg Qday  - MRI brain wo, MRA head/neck  Echo, lipid panel, HbA1c  - permissive HTN to 220/110 for 24 hours, monitor on telemetry  - normothermia, euglycemia  - avoid hypotonic fluids        Desire Garcia MD

## 2022-05-09 NOTE — ASSESSMENT & PLAN NOTE
Stroke alert on 5/8/2022 10:24 PM with initial NIHSS of 1 and LKW 1900, initial Blood Pressure: 169/80  Initial presenting deficits were left upper extremity weakness  As a result of low score pt was determined to not be a candidate for tPA   Exam on 05/10/22: resolved back to baseline, very minimal drift of the LUE   Current Blood Pressure: 144/83, BP over 24 hours: BP  Min: 141/71  Max: 170/83    Home meds: no AP/AC at home    Workup:  Lab Results   Component Value Date    HGBA1C 5 9 (H) 05/08/2022    CHOLESTEROL 206 (H) 05/08/2022    LDLCALC 141 (H) 05/08/2022    TRIG 163 (H) 05/08/2022    INR 0 87 05/08/2022       CTH: no acute findings   CTA: did not receive due to contrast shortage   MRI: R hemispheric infarcts watershed territory, MRA head and neck showing R ICA and R MCA occlusions and L vertebral artery stenosis/occlusion   Echocardiogram: LVEF 40%, grad 1 diastolic dysfunction, no LA dilation   o LUCY: pending read   Telemetry: no events    Pertinent scores:  - NIHSS: 1  Stroke Modified Ct Score: 0 (No baseline symptoms/disability)    Impression: LVO stroke 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    Plan:  - Stroke pathway  - Discussed plan with neurology attending, Dr Milton Ta  - BP: -160 allow for some amount of permissve HTN due to stenosis of numerous intracranial vessels  - Obtain lipids and A1c  - atorvastatin 40mg qhs  - Maintain glucose <180, SSI for coverage if indicated  - Medical management as per primary team appreciated  - Antiplatelet agents: ASA 81 mg, Plavix 75 mg and continue for 3 months due to intracranial athero  - DVT ppx and SCDs  - Monitor on telemetry  - PT/OT/Speech/PM&R input appreciated   - Pending LUCY result  If normal is appropriate for d/c on ASA/Plavix x 3 months   If not normal may need medication re-evaluation prior to d/c

## 2022-05-09 NOTE — PLAN OF CARE
Problem: PHYSICAL THERAPY ADULT  Goal: Performs mobility at highest level of function for planned discharge setting  See evaluation for individualized goals  Description: Treatment/Interventions: Functional transfer training,LE strengthening/ROM,Elevations,Therapeutic exercise,Patient/family training,Endurance training,Equipment eval/education,Bed mobility,Gait training          See flowsheet documentation for full assessment, interventions and recommendations  Note: Prognosis: Good  Problem List: Decreased strength,Decreased endurance,Impaired balance,Decreased mobility,Decreased safety awareness,Impaired vision,Obesity  Assessment: Pt presents with complaints of transient dizziness, lightheadedness and 2 episodes of left-sided ataxia  Dx: stroke-like symptoms and essential HTN  order placed for PT eval and tx  pt presents w/ comorbidities of HTN and hypercholesterolemia and personal factors of advanced age, living in 2 story house, stair(s) to enter home, positive fall history and visual impairments  pt presents w/ weakness, decreased endurance, impaired balance, gait deviations, visual impairment, decreased safety awareness and fall risk  these impairments are evident in findings from physical examination (weakness and impaired vision), mobility assessment (need for standby to min assist w/ all phases of mobility when usually mobilizing independently, tolerance to only 50 feet of ambulation and need for cueing for mobility technique), and Barthel Index: 75/100  pt needed input for mobility technique and safety  pt is at risk for falls due to physical and safety awareness deficits  pt's clinical presentation is unstable/unpredictable (evident in poor blood pressure control, need for assist w/ all phases of mobility when usually mobilizing independently, tolerance to only 50 feet of ambulation and need for input for mobility technique/safety)   pt needs inpatient PT tx to improve mobility deficits and progress mobility training as appropriate  discharge recommendation is for outpatient PT to reduce fall risk and maximize level of functional independence  PT Discharge Recommendation: Home with outpatient rehabilitation          See flowsheet documentation for full assessment

## 2022-05-09 NOTE — PLAN OF CARE
Problem: OCCUPATIONAL THERAPY ADULT  Goal: Performs self-care activities at highest level of function for planned discharge setting  See evaluation for individualized goals  Description: Treatment Interventions: ADL retraining,Visual perceptual retraining,Functional transfer training,Endurance training,Patient/family training,Equipment evaluation/education,Fine motor coordination activities,Neuromuscular reeducation,Continued evaluation,Energy conservation,Activityengagement  Equipment Recommended: Shower/Tub chair with back ($)       See flowsheet documentation for full assessment, interventions and recommendations  Note: Limitation: Decreased ADL status,Decreased UE strength,Decreased endurance,Decreased self-care trans,Decreased fine motor control,Visual deficit,Decreased high-level ADLs     Assessment: Pt is a 63yo male admitted to [facility] on 5/8/22  Pt presents w/ stroke - like symptoms (dizziness, light headedness, and 2 episodes of L sided ataxia) and fall from home  Pt w/ active OT orders and activity orders on stroke pathway  Significant PMH impacting his occupational performance includes HTN  Pt reports living w/ wife in [facility]  Pt reports I w/ ADL/ IADL w/ out use of AD or DME  Personal factors impacting performance includes multi level home environment  Upon eval, pt alert and oriented  Pt required S to complete feeding, min A to complete grooming standing at sink  Pt required S to complete UBD and min A to complete LBD  Pt required S to complete toileting standing at toilet in bathroom  Pt engaged in functional mobility w/ min A w/ out use of AD and S using cane  Pt presents w/ decreased activity tolerance, decreased endurance, decreased standing tolerance, decreased standing balance, decreased coordination impacting his I w/ dressing, bathing, oral hygiene, feeding, functional mobility, functional transfers, food prep, and clothing mgmt   Pt completing ADL below baseline level of I and would benefit from OT while in acute care to address deficits  Recommend DC home w/ OPOT when medically stable for discharge from acute care at this time using AD as needed for functional mobility   Will continue to follow     OT Discharge Recommendation: Home with outpatient rehabilitation (OP OT/PT)

## 2022-05-09 NOTE — OCCUPATIONAL THERAPY NOTE
Occupational Therapy Evaluation     Patient Name: Merari Judd  Today's Date: 5/9/2022  Problem List  Principal Problem:    Stroke-like symptoms  Active Problems:    Hyperlipidemia    Hypertension, essential    Past Medical History  Past Medical History:   Diagnosis Date    Elevated cholesterol with high triglycerides      Past Surgical History  Past Surgical History:   Procedure Laterality Date    HERNIA REPAIR Left     inguinal    [place] COLONOSCOPY FLX DX W/COLLJ SPEC WHEN PFRMD N/A 2/24/2017    Procedure: COLONOSCOPY;  Surgeon: Calin Alvares MD;  [facility]; Service: Colorectal        05/09/22 1501   OT Last Visit   OT Visit Date 05/09/22  (Monday)   Note Type   Note type Evaluation   Restrictions/Precautions   Weight Bearing Precautions Per Order No   Other Precautions Chair Alarm;Visual impairment;Telemetry; Fall Risk   Pain Assessment   Pain Assessment Tool 0-10   Pain Score No Pain   Home Living   Type of Home House   Home Layout Two level;Bed/bath upstairs; Other (Comment)  (full bath on main level)   Bathroom Shower/Tub Walk-in shower  (tub/shower on first floor)   [address] [employer] [employer] [place] Other (Comment)  (no DME)   [address]   (not using AD or DME PTA)   Additional Comments Pt reports living w/ wife in [address] [place] [address]   Prior Function   Level of Day Independent with ADLs and functional mobility   Lives With Spouse   ADL Assistance Independent   IADLs Independent  (+ drive)   Falls in the last 6 months 0   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)   Comments Pt reports I w/ ADL/ IADL PTA w/ out use of AD or DME   Lifestyle   Autonomy Pt reports I w/ ADL/ IADL PTA w/ out use of AD    Reciprocal Relationships Pt reports living w/ supportive wife, present during eval   Service to Others 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   Continues to be involved w/ sports teams   Intrinsic Gratification Pt reports enjoying sports and played saxophone   Psychosocial   Patient Behaviors/Mood Calm; Cooperative   Subjective   Subjective "I need to use the bathroom"   ADL   Where Assessed Chair  (vs bathroom)   Eating Assistance 5  Supervision/Setup  (anticipate cues to consistently locate obejcts L)   Eating Deficit Setup; Increased time to complete; Other (Comment)  (demo ROM / coordination to complete)   Grooming Assistance 4  Minimal Assistance   Grooming Deficit Setup;Steadying;Supervision/safety; Increased time to complete;Verbal cueing   UB Bathing Assistance Unable to assess   LB Bathing Assistance Unable to assess   UB Dressing Assistance 5  Supervision/Setup   UB Dressing Deficit Setup;Supervision/safety; Increased time to complete; Fasteners   LB Dressing Assistance 4  Minimal Assistance   LB Dressing Deficit Thread RLE into pants; Thread LLE into pants;Pull up over hips; Fasteners;Setup; Increased time to complete;Verbal cueing   Toileting Assistance  5  Supervision/Setup   Toileting Deficit Setup; Increased time to complete;Supervison/safety  (standing at toilet)   Bed Mobility   Supine to Sit Unable to assess   Sit to Supine Unable to assess   Additional Comments Pt seated OOB In chair upon arrival and post eval w/ needs met, call bell in reach and chair alarm activated  Care coordination w/ PT, RJ due to decreased activity tolerance and L trunk lean  Transfers   Sit to Stand 5  Supervision   Additional items Assist x 1; Armrests; Increased time required;Verbal cues   Stand to Sit 5  Supervision   Additional items Assist x 1; Armrests; Increased time required;Verbal cues   Functional Mobility   Functional Mobility 4  Minimal assistance  (CG/ steadying)   Additional Comments Pt engaged in functional mobility w/ min A w/ out use of AD and (close) S using cane  Engaged in functional mobility household distances using cane w/ S   Cues / prompts to consistently negotiate/ attend to obestacles on L   Additional items   (no AD vs cane) Balance   Static Sitting Fair +   Static Standing Fair -   Ambulatory Fair -   Activity Tolerance   Activity Tolerance Patient limited by fatigue   Medical Staff Made Aware care coordination w/ PT, RJ   Nurse Made Aware per RN, Kaden Harding / Radha Collado appropriate to see pt   RUE Assessment   RUE Assessment WFL   RUE Strength   RUE Overall Strength Within Functional Limits - able to perform ADL tasks with strength   LUE Assessment   LUE Assessment WFL   LUE Strength   LUE Overall Strength Within Functional Limits - able to perform ADL tasks with strength   Hand Function   Gross Motor Coordination Functional   Fine Motor Coordination Impaired  (+ time, finger to nose)   Sensation   Light Touch No apparent deficits  (B UE to light touch)   Sharp/Dull Not tested   Vision-Basic Assessment   Current Vision Wears glasses all the time   Vision - Complex Assessment   Ocular Range of Motion WFL   Head Position WDL   Tracking Able to track stimulus in all quads without difficulty   Saccades Decreased speed of pursuit between targets  (to L horizontally)   Visual Fields   (cues to attend /detect objects on L)   Additional Comments Decreased attention and difficulty following directions during visual assessment  Difficulty focusing gaze on stationary objects in front    Perception   Inattention/Neglect Cues to maintain midline in standing;Cues to attend left visual field   Motor Planning Appears intact   Perseveration   (cues to sustain attention to task)   Cognition   Overall Cognitive Status [facility]   Arousal/Participation Alert; Cooperative   Attention Attends with cues to redirect   Orientation Level Oriented to person;Oriented to place;Oriented to situation   Memory   (appears [facility], appropriate long term)   Following Commands Follows multistep commands with increased time or repetition   Comments Identified pt by full name and birthdate  Alert and generally oriented  Able to participate in conversation, follow directions w/ + time   Slow to respond at times  Assessment   Limitation Decreased ADL status; Decreased UE strength;Decreased endurance;Decreased self-care trans;Decreased fine motor control;Visual deficit; Decreased high-level ADLs   Assessment Pt is a 63yo male [facility]  Pt presents w/ stroke - like symptoms (dizziness, light headedness, and 2 episodes of L sided ataxia) and fall from home  Pt w/ active OT orders and activity orders on stroke pathway  Significant PMH impacting his occupational performance includes HTN  Pt reports living w/ wife in [facility] PTA  Pt reports I w/ ADL/ IADL w/ out use of AD or DME  Personal factors impacting performance includes multi level home environment  Upon eval, pt alert and oriented  Pt required S to complete feeding, min A to complete grooming standing at sink  Pt required S to complete UBD and min A to complete LBD  Pt required S to complete toileting standing at toilet in bathroom  Pt engaged in functional mobility w/ min A w/ out use of AD and S using cane  Pt presents w/ decreased activity tolerance, decreased endurance, decreased standing tolerance, decreased standing balance, decreased coordination impacting his I w/ dressing, bathing, oral hygiene, feeding, functional mobility, functional transfers, food prep, and clothing mgmt  Pt completing ADL below baseline level of I and would benefit from OT while in acute care to address deficits  Recommend DC home w/ OPOT when medically stable for discharge from acute care at this time using AD as needed for functional mobility  Will continue to follow   Goals   Patient Goals Pt stated that he would like to return home and to baseline level of I  Plan   Treatment Interventions ADL retraining;Visual perceptual retraining;Functional transfer training; Endurance training;Patient/family training;Equipment evaluation/education; Fine motor coordination activities; Neuromuscular reeducation;Continued evaluation; Energy conservation; Activityengagement   Goal Expiration Date 05/16/22   OT Frequency 2-3x/wk   Recommendation   OT Discharge Recommendation Home with outpatient rehabilitation  (OP OT/PT)   Equipment Recommended Shower/Tub chair with back ($)   AM-PAC Daily Activity Inpatient   Lower Body Dressing 3   Bathing 3   Toileting 3   Upper Body Dressing 3   Grooming 4   Eating 4   Daily Activity Raw Score 20   Daily Activity Standardized Score (Calc for Raw Score >=11) 42 03   AM-PAC Applied Cognition Inpatient   Following a Speech/Presentation 3   Understanding Ordinary Conversation 4   Taking Medications 3   Remembering Where Things Are Placed or Put Away 4   Remembering List of 4-5 Errands 3   Taking Care of Complicated Tasks 3   Applied Cognition Raw Score 20   Applied Cognition Standardized Score 41 76   Barthel Index   Feeding 10   Bathing 0   Grooming Score 5   Dressing Score 5   Bladder Score 10   Bowels Score 10   Toilet Use Score 5   Transfers (Bed/Chair) Score 10   Mobility (Level Surface) Score 10   Stairs Score 0   Barthel Index Score 65   Modified Lares Scale   Modified Lares Scale 3   The patient's raw score on the AM-PAC Daily Activity inpatient short form is 20, standardized score is 42 03, greater than 39 4  Patients at this level are likely to benefit from discharge to home  Please refer to the recommendation of the Occupational Therapist for safe discharge planning       Pt goals to be met by 5/16/22:  -Pt will complete functional transfers to bed, chair, and toilet using AD, DME as needed to max I w/ ADLs w/ mod I to return home to PLOF    -Pt will consistently engage in functional mobility using AD as needed w/ S household distances to max I and improve engagement to return home and watch grand daughter    -Pt will complete LBD w/ S to max I w/ ADLs    -Pt will complete bed mobility supine <> sit w/ mod I to max I and return home    -Pt will demonstrate good attention and participation in continued evaluation to assess functional vision to assist in DC planning and max I w/ ADLs    -Pt will consistently demonstrate good attention and understanding compensatory strategies to max I w/ ADLs and improve engagement    [facility], OTR/L

## 2022-05-09 NOTE — ASSESSMENT & PLAN NOTE
 Blood pressure is reviewed and acceptable   o Last recorded pressure:  152/80   PTA medication of amlodipine, will hold at this time to allow for permissive hypertension in the setting of CVA pathway   Monitor blood pressure

## 2022-05-09 NOTE — SPEECH THERAPY NOTE
Speech Language/Pathology  Speech/Language Pathology  Assessment    Patient Name: Wen Hernandez  Today's Date: 5/9/2022     Problem List  Principal Problem:    Stroke-like symptoms  Active Problems:    Hyperlipidemia    Hypertension, essential    Past Medical History  Past Medical History:   Diagnosis Date    Elevated cholesterol with high triglycerides      Past Surgical History  Past Surgical History:   Procedure Laterality Date    HERNIA REPAIR Left     inguinal    [place] COLONOSCOPY FLX DX W/COLLJ SPEC WHEN PFRMD N/A 2/24/2017    Procedure: COLONOSCOPY;  Surgeon: Juanito Rodriguez MD;  [facility]; Service: Colorectal     Wen Hernandez  is a 74 y o  male with a PMH of hypertension and  who presents with complaints of transient dizziness, lightheadedness and 2 episodes of left-sided ataxia that began around 1900 on 05/08/2022  Patient reported that his left leg did not lift up all the way and that is why he stumbled when attempting to walk up 1 step in his home  Patient endorses episodes similarly of feeling off balance in the past when he had an ear infection  Patient originally presented to urgent care who referred him to the ED for further evaluation  Upon evaluation ED, patient was noted to have deficits of a very subtle pronator drift to the left arm as well as subtle confusion shown wondering out of his room       Consult received for speech/swallow eval on stroke pathway  Pt passed nsg swallow screen; pt seen, denied difficulty swallowing breakfast and lunch  No reported change in speech  NIH score is 1  MRI: pending  No need for formal speech/swallow eval at this time  Reconsult if needed      Neftaly Artis CCC-SLP  Speech Pathologist  Available via  tiger text

## 2022-05-09 NOTE — ASSESSMENT & PLAN NOTE
· Presentation:  Patient presents with complaints of transient dizziness, lightheadedness and 2 episodes of left-sided ataxia that began around 1900 on 05/08/2022  Patient reported that his left leg did not lift up all the way and that is why he stumbled when attempting to walk up 1 step in his home  Patient endorses episodes similarly of feeling off balance in the past when he had an ear infection  Patient originally presented to urgent care who referred him to the ED for further evaluation  Upon evaluation ED, patient was noted to have deficits of a very subtle pronator drift to the left arm as well as subtle confusion shown wondering out of his room  · CT head: "No acute territorial infarct, hemorrhage, or midline shift  Asymmetric hyperattenuated appearance distal right M1/perisylvian branches of the right MCA suspicious for acute thrombus given patient symptomatology "  · Due to patient's mild symptoms, it was determined that he was not a candidate for tPA nor thrombectomy and possibly the CT results could be artifact   Stroke Pathway  o Frequent vital signs  o Neuro checks  o Telemetry  o Check lipid panel and A1c   o Aspirin and statin   Obtain MRI brain  [facility]   PT/OT consultation

## 2022-05-09 NOTE — CONSULTS
Duplicate consult  Please see original consult note written by Laura Birmingham MD and cosigned by Cyndy Lynn MD on 5/9/22

## 2022-05-09 NOTE — H&P
[facility]  [facility]- Aviva Leyva  1955, 74 y o  male MRN: [id number]  Unit/Bed#: ED 26 Encounter: [id number]  Primary Care Provider: Darren Greenwood MD   Date and time admitted to hospital: 5/8/2022 10:24 PM    * Stroke-like symptoms  Assessment & Plan  · Presentation:  Patient presents with complaints of transient dizziness, lightheadedness and 2 episodes of left-sided ataxia that began around 1900 on 05/08/2022  Patient reported that his left leg did not lift up all the way and that is why he stumbled when attempting to walk up 1 step in his home  Patient endorses episodes similarly of feeling off balance in the past when he had an ear infection  Patient originally presented to urgent care who referred him to the ED for further evaluation  Upon evaluation ED, patient was noted to have deficits of a very subtle pronator drift to the left arm as well as subtle confusion shown wondering out of his room  · CT head: "No acute territorial infarct, hemorrhage, or midline shift  Asymmetric hyperattenuated appearance distal right M1/perisylvian branches of the right MCA suspicious for acute thrombus given patient symptomatology "  · Due to patient's mild symptoms, it was determined that he was not a candidate for tPA nor thrombectomy and possibly the CT results could be artifact   Stroke Pathway  o Frequent vital signs  o Neuro checks  o Telemetry  o Check lipid panel and A1c   o Aspirin and statin   Obtain MRI brain  [facility] Neurology   PT/OT consultation  Hypertension, essential  Assessment & Plan   Blood pressure is reviewed and acceptable   o Last recorded pressure:  152/80   PTA medication of amlodipine, will hold at this time to allow for permissive hypertension in the setting of CVA pathway   Monitor blood pressure  Hyperlipidemia  Assessment & Plan  · PTA medication of fenofibrate  · Lipid panel pending  · Initiate statin      VTE Pharmacologic Prophylaxis: VTE Score: 3 Moderate Risk (Score 3-4) - Pharmacological DVT Prophylaxis Ordered: enoxaparin (Lovenox)  Code Status: Level 1 - Full Code   Discussion with family: Updated  (wife) at bedside  Anticipated Length of Stay: Patient will be admitted on an inpatient basis with an anticipated length of stay of greater than 2 midnights secondary to CVA pathway  Total Time for Visit, including Counseling / Coordination of Care: 70 minutes Greater than 50% of this total time spent on direct patient counseling and coordination of care  Chief Complaint: Dizziness, lightheadedness and left sided imbalance    History of Present Illness:  Jeannie Grullon  is a 74 y o  male with a PMH of hypertension and  who presents with complaints of transient dizziness, lightheadedness and 2 episodes of left-sided ataxia that began around 1900 on 05/08/2022  Patient reported that his left leg did not lift up all the way and that is why he stumbled when attempting to walk up 1 step in his home  Patient endorses episodes similarly of feeling off balance in the past when he had an ear infection  Patient originally presented to urgent care who referred him to the ED for further evaluation  Upon evaluation ED, patient was noted to have deficits of a very subtle pronator drift to the left arm as well as subtle confusion shown wondering out of his room       Patient will be admitted for CVA pathway including neurology consult and MRI of brain due to abnormal CT head  Review of Systems:  Review of Systems   Constitutional: Negative for chills and fever  HENT: Negative for trouble swallowing  Eyes: Negative for visual disturbance  Respiratory: Negative for cough and shortness of breath  Cardiovascular: Negative for chest pain  Gastrointestinal: Negative for abdominal pain, nausea and vomiting  Musculoskeletal: Positive for gait problem     Neurological: Positive for dizziness, weakness and light-headedness  Negative for syncope, facial asymmetry, speech difficulty, numbness and headaches  All other systems reviewed and are negative  Past Medical and Surgical History:   Past Medical History:   Diagnosis Date    Elevated cholesterol with high triglycerides        Past Surgical History:   Procedure Laterality Date    HERNIA REPAIR Left     inguinal    [place] COLONOSCOPY FLX DX W/COLLJ SPEC WHEN PFRMD N/A 2/24/2017    Procedure: COLONOSCOPY;  Surgeon: Adri Almaraz MD;  [facility]; Service: Colorectal       Meds/Allergies:  Prior to Admission medications    Medication Sig Start Date End Date Taking? Authorizing Provider   amLODIPine (NORVASC) 5 mg tablet Take 1 tablet (5 mg total) by mouth daily 9/22/21  Yes Chen Gant MD   fenofibrate (TRICOR) 48 mg tablet Take 1 tablet (48 mg total) by mouth daily 3/2/22 2/25/23 Yes Chen Gant MD     I have reviewed home medications with patient personally  Allergies:    Allergies   Allergen Reactions    Penicillins Rash       Social History:  Marital Status: /Civil Union   Occupation: Retired teacher  Patient Pre-hospital Living Situation: Home, With spouse  Patient Pre-hospital Level of Mobility: walks  Patient Pre-hospital Diet Restrictions: None  Substance Use History:   Social History     Substance and Sexual Activity   Alcohol Use Yes    Comment: 1-2 per week; DENIED: HISTORY OF ALCOHOL USE AS PER ALL SCRIPTS     Social History     Tobacco Use   Smoking Status Never Smoker   Smokeless Tobacco Never Used     Social History     Substance and Sexual Activity   Drug Use No       Family History:  Family History   Problem Relation Age of Onset    Diabetes Mother     Ulcerative colitis Son     Stroke Father         SYNDROME        Physical Exam:     Vitals:   Blood Pressure: 152/80 (05/09/22 0000)  Pulse: 68 (05/09/22 0000)  Temperature: 97 6 °F (36 4 °C) (05/08/22 2325)  Temp Source: Oral (05/08/22 8145)  Respirations: 20 (05/09/22 0000)  Height: 6' (182 9 cm) (05/08/22 2246)  Weight - Scale: 114 kg (250 lb 14 1 oz) (05/08/22 2229)  SpO2: 95 % (05/09/22 0000)    Physical Exam  Vitals and nursing note reviewed  Constitutional:       General: He is not in acute distress  Appearance: He is obese  He is not ill-appearing, toxic-appearing or diaphoretic  HENT:      Head: Normocephalic  Nose: Nose normal       Mouth/Throat:      Pharynx: Oropharynx is clear  Eyes:      General: No visual field deficit or scleral icterus  Extraocular Movements: Extraocular movements intact  Conjunctiva/sclera: Conjunctivae normal       Pupils: Pupils are equal, round, and reactive to light  Cardiovascular:      Rate and Rhythm: Normal rate and regular rhythm  Pulses: Normal pulses  Heart sounds: Normal heart sounds  No murmur heard  Pulmonary:      Effort: Pulmonary effort is normal  No respiratory distress  Breath sounds: Normal breath sounds  No wheezing, rhonchi or rales  Chest:      Chest wall: No tenderness  Abdominal:      General: Bowel sounds are normal  There is no distension  Palpations: Abdomen is soft  Tenderness: There is no abdominal tenderness  Musculoskeletal:         General: No swelling or deformity  Normal range of motion  Cervical back: Normal range of motion  Skin:     General: Skin is warm and dry  Neurological:      Mental Status: He is alert and oriented to person, place, and time  Cranial Nerves: No dysarthria  Sensory: No sensory deficit  Motor: Pronator drift (left, very slight) present  Coordination: Finger-Nose-Finger Test and Heel to [place] gopal Test normal       Comments: Slight left mouth droop when face is resting  Symmetrical smile  Midline tongue     Psychiatric:         Speech: Speech normal           Additional Data:     Lab Results:  Results from last 7 days   Lab Units 05/08/22 2244   WBC Thousand/uL 8 55 HEMOGLOBIN g/dL 15 7   HEMATOCRIT % 46 4   PLATELETS Thousands/uL 248     Results from last 7 days   Lab Units 05/08/22  2244   SODIUM mmol/L 139   POTASSIUM mmol/L 4 3   CHLORIDE mmol/L 108   CO2 mmol/L 23   BUN mg/dL 14   CREATININE mg/dL 0 93   ANION GAP mmol/L 8   CALCIUM mg/dL 9 3   GLUCOSE RANDOM mg/dL 198*     Results from last 7 days   Lab Units 05/08/22  2244   INR  0 87     Results from last 7 days   Lab Units 05/08/22  2237   POC GLUCOSE mg/dl 191*               Imaging: Reviewed radiology reports from this admission including: CT head  CT stroke alert brain   Final Result by Tyrone Canales MD (05/08 2335)      No acute territorial infarct, hemorrhage, or midline shift  Asymmetric hyperattenuated appearance distal right M1/perisylvian branches of the right MCA suspicious for acute thrombus given patient symptomatology  Findings were directly discussed with Dr Desmond Serrano at 11:15 PM       Workstation performed: [id number]         XR chest 1 view portable    (Results Pending)   MRI Inpatient Order    (Results Pending)       EKG and Other Studies Reviewed on Admission:   · EKG: NSR  HR 68     ** Please Note: This note has been constructed using a voice recognition system   **

## 2022-05-09 NOTE — PLAN OF CARE
Problem: Potential for Falls  Goal: Patient will remain free of falls  Description: INTERVENTIONS:  - Educate patient/family on patient safety including physical limitations  - Instruct patient to call for assistance with activity   - Consult OT/PT to assist with strengthening/mobility   - Keep Call bell within reach  - Keep bed low and locked with side rails adjusted as appropriate  - Keep care items and personal belongings within reach  - Initiate and maintain comfort rounds  - Make Fall Risk Sign visible to staff  - Offer Toileting every  Hours, in advance of need  - Initiate/Maintain alarm  - Obtain necessary fall risk management equipment:   - Apply yellow socks and bracelet for high fall risk patients  - Consider moving patient to room near nurses station  Outcome: Progressing     Problem: Prexisting or High Potential for Compromised Skin Integrity  Goal: Skin integrity is maintained or improved  Description: INTERVENTIONS:  - Identify patients at risk for skin breakdown  - Assess and monitor skin integrity  - Assess and monitor nutrition and hydration status  - Monitor labs   - Assess for incontinence   - Turn and reposition patient  - Assist with mobility/ambulation  - Relieve pressure over bony prominences  - Avoid friction and shearing  - Provide appropriate hygiene as needed including keeping skin clean and dry  - Evaluate need for skin moisturizer/barrier cream  - Collaborate with interdisciplinary team   - Patient/family teaching  - Consider wound care consult   Outcome: Progressing     Problem: MOBILITY - ADULT  Goal: Maintain or return to baseline ADL function  Description: INTERVENTIONS:  -  Assess patient's ability to carry out ADLs; assess patient's baseline for ADL function and identify physical deficits which impact ability to perform ADLs (bathing, care of mouth/teeth, toileting, grooming, dressing, etc )  - Assess/evaluate cause of self-care deficits   - Assess range of motion  - Assess patient's mobility; develop plan if impaired  - Assess patient's need for assistive devices and provide as appropriate  - Encourage maximum independence but intervene and supervise when necessary  - Involve family in performance of ADLs  - Assess for home care needs following discharge   - Consider OT consult to assist with ADL evaluation and planning for discharge  - Provide patient education as appropriate  Outcome: Progressing  Goal: Maintains/Returns to pre admission functional level  Description: INTERVENTIONS:  - Perform BMAT or MOVE assessment daily    - Set and communicate daily mobility goal to care team and patient/family/caregiver  - Collaborate with rehabilitation services on mobility goals if consulted  - Perform Range of Motion  times a day  - Reposition patient every  hours  - Dangle patient  times a day  - Stand patient  times a day  - Ambulate patient  times a day  - Out of bed to chair  times a day   - Out of bed for meals  times a day  - Out of bed for toileting  - Record patient progress and toleration of activity level   Outcome: Progressing     Problem: Neurological Deficit  Goal: Neurological status is stable or improving  Description: Interventions:  - Monitor and assess patient's level of consciousness, motor function, sensory function, and level of assistance needed for ADLs  - Monitor and report changes from baseline  Collaborate with interdisciplinary team to initiate plan and implement interventions as ordered  - Provide and maintain a safe environment  - Consider seizure precautions  - Consider fall precautions  - Consider aspiration precautions  - Consider bleeding precautions  Outcome: Progressing     Problem: Activity Intolerance/Impaired Mobility  Goal: Mobility/activity is maintained at optimum level for patient  Description: Interventions:  - Assess and monitor patient  barriers to mobility and need for assistive/adaptive devices    - Assess patient's emotional response to limitations  - Collaborate with interdisciplinary team and initiate plans and interventions as ordered  - Encourage independent activity per ability   - Maintain proper body alignment  - Perform active/passive rom as tolerated/ordered  - Plan activities to conserve energy   - Turn patient as appropriate  Outcome: Progressing     Problem: Communication Impairment  Goal: Ability to express needs and understand communication  Description: Assess patient's communication skills and ability to understand information  Patient will demonstrate use of effective communication techniques, alternative methods of communication and understanding even if not able to speak  - Encourage communication and provide alternate methods of communication as needed  - Collaborate with case management/ for discharge needs  - Include patient/family/caregiver in decisions related to communication  Outcome: Progressing     Problem: Potential for Aspiration  Goal: Non-ventilated patient's risk of aspiration is minimized  Description: Assess and monitor vital signs, respiratory status, and labs (WBC)  Monitor for signs of aspiration (tachypnea, cough, rales, wheezing, cyanosis, fever)  - Assess and monitor patient's ability to swallow  - Place patient up in chair to eat if possible  - HOB up at 90 degrees to eat if unable to get patient up into chair   - Supervise patient during oral intake  - Instruct patient/ family to take small bites  - Instruct patient/ family to take small single sips when taking liquids  - Follow patient-specific strategies generated by speech pathologist   Outcome: Progressing     Problem: Nutrition  Goal: Nutrition/Hydration status is improving  Description: Monitor and assess patient's nutrition/hydration status for malnutrition (ex- brittle hair, bruises, dry skin, pale skin and conjunctiva, muscle wasting, smooth red tongue, and disorientation)   Collaborate with interdisciplinary team and initiate plan and interventions as ordered  Monitor patient's weight and dietary intake as ordered or per policy  Utilize nutrition screening tool and intervene per policy  Determine patient's food preferences and provide high-protein, high-caloric foods as appropriate  - Assist patient with eating   - Allow adequate time for meals   - Encourage patient to take dietary supplement as ordered  - Collaborate with clinical nutritionist   - Include patient/family/caregiver in decisions related to nutrition    Outcome: Progressing

## 2022-05-09 NOTE — ED PROVIDER NOTES
History  Chief Complaint   Patient presents with    CVA/TIA-like Symptoms     Per EMS patient became dizzy and fell to one knee  denies LOC/headstrike  [facility] and [facility] over due to L sided ataxia  denies any neuro symptoms at this time     HPI    Patient is a pleasant 67-year-old male that reports to the emergency department with reported left-sided ataxia  At 7:00 a m  He noticed an episode of lightheadedness and some stumbling when walking up the stairs  He noted that he felt like his left leg did not lift up all the way and this is why stumble  No head strike, no loss consciousness  Lightheadedness was mild  He continued to notice the symptoms and went to urgent care, at urgent care they notice some left-sided ataxia and sent him in  During my exam/, on initial presentation, patient with very subtle pronator drift to the left arm, otherwise barely perceptible if present at all weakness in the left hand /left arm  Full strength and sensation bilateral lower extremities  Patient does have some very subtle confusion as well that and wondered slightly out of his room but otherwise he is awake alert and oriented x4  Absolutely no other focal neurological symptoms  No ataxia on my exam   Normal finger-to-nose, normal gait  Normal sensation  Discussed the case with Neurology at length given the patient's mild symptoms  The CT did show hyperdense MCA sign, however, given the very mild nature of his presenting symptoms he is not a candidate for tPA nor thrombectomy and indeed, the CT results may simply be a artifact  For now, will admit, will put on close neurological monitoring, start aspirin, if patient develops worsening symptoms consistent with a large MCA clot, will repeat stroke alert, consider tPA, CTA, CT perfusion study, thrombectomy at that time                  REVIEW OF SYSTEMS  Positive for lightheadedness  All other systems reviewed and are negative unless noted in this section or otherwise in the chart  Physical Exam  Vitals and nursing note reviewed  Constitutional:    Appearance:  Patient is well-developed  No diaphoresis  HENT:   Head: Normocephalic and atraumatic  Right Ear: External ear normal    Left Ear: External ear normal    Nose: No congestion  Eyes:   Conjunctiva/sclera: Conjunctivae normal    Right eye: No discharge  Left eye: No discharge  Extraocular Movements: Extraocular movements intact  Neck:   Vascular: No JVD  Trachea: No tracheal deviation  Cardiovascular:   Rate and Rhythm: Normal rate and regular rhythm  Heart sounds: Normal heart sounds  Pulmonary:   Effort: Pulmonary effort is normal  No respiratory distress  Breath sounds: No wheezing or rales  Abdominal:   Palpations: Abdomen is soft  Tenderness: There is no abdominal tenderness  There is no guarding or rebound  Musculoskeletal:      General: No tenderness  Cervical back: Normal range of motion and neck supple  Skin:  General: Skin is warm and dry  Findings: No erythema or rash  Neurological:   Patient with very subtle pronator drift to the left arm, otherwise barely perceptible if present at all weakness in the left hand /left arm  Full strength and sensation bilateral lower extremities  Patient does have some very subtle confusion as well that and wondered slightly out of his room but otherwise he is awake alert and oriented x4  Absolutely no other focal neurological symptoms  No ataxia on my exam   Normal finger-to-nose, normal gait  Normal sensation  Psychiatric:      Behavior: Behavior normal       Thought Content: Thought content normal                                  Prior to Admission Medications   Prescriptions Last Dose Informant Patient Reported? Taking?    amLODIPine (NORVASC) 5 mg tablet 5/8/2022 at Unknown time  No Yes   Sig: Take 1 tablet (5 mg total) by mouth daily   fenofibrate (TRICOR) 48 mg tablet 5/8/2022 at Unknown time No Yes   Sig: Take 1 tablet (48 mg total) by mouth daily      Facility-Administered Medications: None       Past Medical History:   Diagnosis Date    Elevated cholesterol with high triglycerides        Past Surgical History:   Procedure Laterality Date    HERNIA REPAIR Left     inguinal    NE COLONOSCOPY FLX DX W/COLLJ SPEC WHEN PFRMD N/A 2/24/2017    Procedure: COLONOSCOPY;  Surgeon: Felicity Munguia MD;  Location: [facility]; Service: Colorectal       Family History   Problem Relation Age of Onset    Diabetes Mother     Ulcerative colitis Son     Stroke Father         SYNDROME      I have reviewed and agree with the history as documented      E-Cigarette/Vaping    E-Cigarette Use Never User      E-Cigarette/Vaping Substances    Nicotine No     THC No     CBD No     Flavoring No     Other No     Unknown No      Social History     Tobacco Use    Smoking status: Never Smoker    Smokeless tobacco: Never Used   Vaping Use    Vaping Use: Never used   Substance Use Topics    Alcohol use: Yes     Comment: 1-2 per week; DENIED: HISTORY OF ALCOHOL USE AS PER ALL SCRIPTS    Drug use: No       Review of Systems    Physical Exam  Physical Exam    Vital Signs  ED Triage Vitals   Temperature Pulse Respirations Blood Pressure SpO2   05/08/22 2325 05/08/22 2229 05/08/22 2229 05/08/22 2229 05/08/22 2229   97 6 °F (36 4 °C) 75 20 169/80 97 %      Temp Source Heart Rate Source Patient Position - Orthostatic VS BP Location FiO2 (%)   05/08/22 2325 05/08/22 2229 05/08/22 2229 05/08/22 2229 --   Oral Monitor Lying Right arm       Pain Score       --                  Vitals:    05/08/22 2239 05/08/22 2245 05/08/22 2300 05/08/22 2315   BP: 169/80 157/83 153/78 159/73   Pulse:  72 72 70   Patient Position - Orthostatic VS: Sitting Sitting  Sitting         Visual Acuity  Visual Acuity      Most Recent Value   L Pupil Size (mm) 3   R Pupil Size (mm) 3          ED Medications  Medications   aspirin tablet 325 mg (has no administration in time range)       Diagnostic Studies  Results Reviewed     Procedure Component Value Units Date/Time    COVID/FLU/RSV - 2 hour TAT [[id number]]  (Normal) Collected: 05/08/22 2244    Lab Status: Final result Specimen: Nares from Nose Updated: 05/08/22 2338     SARS-CoV-2 Negative     INFLUENZA A PCR Negative     INFLUENZA B PCR Negative     RSV PCR Negative    Narrative:      FOR PEDIATRIC PATIENTS - copy/paste COVID Guidelines URL to browser: [email or web address]  [employer]    SARS-CoV-2 assay is a Nucleic Acid Amplification assay intended for the  qualitative detection of nucleic acid from SARS-CoV-2 in nasopharyngeal  swabs  Results are for the presumptive identification of SARS-CoV-2 RNA  Positive results are indicative of infection with SARS-CoV-2, the virus  causing COVID-19, but do not rule out bacterial infection or co-infection  with other viruses  Laboratories within the [place] and its  territories are required to report all positive results to the appropriate  public health authorities  Negative results do not preclude SARS-CoV-2  infection and should not be used as the sole basis for treatment or other  patient management decisions  Negative results must be combined with  clinical observations, patient history, and epidemiological information  This test has not been FDA cleared or approved  This test has been authorized by FDA under an Emergency Use Authorization  (EUA)  This test is only authorized for the duration of time the  declaration that circumstances exist justifying the authorization of the  emergency use of an in vitro diagnostic tests for detection of SARS-CoV-2  virus and/or diagnosis of COVID-19 infection under section 564(b)(1) of  the Act, 21 U  S C  502ITP-0(O)(5), unless the authorization is terminated  or revoked sooner  The test has been validated but independent review by FDA  and CLIA is pending      Test performed using [employer] GeneXpert: This RT-PCR assay targets N2,  a region unique to SARS-CoV-2  A conserved region in the E-gene was chosen  for pan-Sarbecovirus detection which includes SARS-CoV-2  HS Troponin I 2hr [[id number]]     Lab Status: No result Specimen: Blood     HS Troponin 0hr (reflex protocol) [[id number]]  (Normal) Collected: 05/08/22 2244    Lab Status: Final result Specimen: Blood from Arm, Right Updated: 05/08/22 2322     hs TnI 0hr 3 ng/L     Salicylate level [[id number]]  (Normal) Collected: 05/08/22 2244    Lab Status: Final result Specimen: Blood from Arm, Right Updated: 62/20/02 8942     Salicylate Lvl <5 mg/dL     Basic metabolic panel [[id number]]  (Abnormal) Collected: 05/08/22 2244    Lab Status: Final result Specimen: Blood from Arm, Right Updated: 05/08/22 2315     Sodium 139 mmol/L      Potassium 4 3 mmol/L      Chloride 108 mmol/L      CO2 23 mmol/L      ANION GAP 8 mmol/L      BUN 14 mg/dL      Creatinine 0 93 mg/dL      Glucose 198 mg/dL      Calcium 9 3 mg/dL      eGFR 84 ml/min/1 73sq m     Narrative:      [place] guidelines for Chronic Kidney Disease (CKD):     Stage 1 with normal or high GFR (GFR > 90 mL/min/1 73 square meters)    Stage 2 Mild CKD (GFR = 60-89 mL/min/1 73 square meters)    Stage 3A Moderate CKD (GFR = 45-59 mL/min/1 73 square meters)    Stage 3B Moderate CKD (GFR = 30-44 mL/min/1 73 square meters)    Stage 4 Severe CKD (GFR = 15-29 mL/min/1 73 square meters)    Stage 5 End Stage CKD (GFR <15 mL/min/1 73 square meters)  Note: GFR calculation is accurate only with a steady state creatinine    Acetaminophen level-If concentration is detectable, please discuss with medical  on call   [[id number]]  (Abnormal) Collected: 05/08/22 2244    Lab Status: Final result Specimen: Blood from Arm, Right Updated: 05/08/22 2315     Acetaminophen Level <10 ug/mL     Ethanol [[id number]]  (Normal) Collected: 05/08/22 2244    Lab Status: Final result Specimen: Blood from Arm, Right Updated: 05/08/22 2313     Ethanol Lvl <10 mg/dL     Protime-INR [[id number]]  (Normal) Collected: 05/08/22 2244    Lab Status: Final result Specimen: Blood from Arm, Right Updated: 05/08/22 2304     Protime 11 9 seconds      INR 0 87    APTT [[id number]]  (Normal) Collected: 05/08/22 2244    Lab Status: Final result Specimen: Blood from Arm, Right Updated: 05/08/22 2304     PTT 32 seconds     Fingerstick Glucose (POCT) [[id number]]  (Abnormal) Collected: 05/08/22 2237    Lab Status: Final result Updated: 05/08/22 2258     POC Glucose 191 mg/dl     CBC and Platelet [[id number]]  (Normal) Collected: 05/08/22 2244    Lab Status: Final result Specimen: Blood from Arm, Right Updated: 05/08/22 2254     WBC 8 55 Thousand/uL      RBC 5 17 Million/uL      Hemoglobin 15 7 g/dL      Hematocrit 46 4 %      MCV 90 fL      MCH 30 4 pg      MCHC 33 8 g/dL      RDW 13 0 %      Platelets 425 Thousands/uL      MPV 10 3 fL                  CT stroke alert brain   Final Result by Shannan Melissa MD (05/08 2335)      No acute territorial infarct, hemorrhage, or midline shift  Asymmetric hyperattenuated appearance distal right M1/perisylvian branches of the right MCA suspicious for acute thrombus given patient symptomatology  Findings were directly discussed with Dr Mickey Carreno at 11:15 PM       Workstation performed: [id number]         XR chest 1 view portable    (Results Pending)              Procedures  Procedures         ED Course  ED Course as of 05/08/22 2347   Sun May 08, 2022   2333 Spoke with Dr Rendon Gave again - no tpa given very mild symptoms, obviously no indication for thrombectomy either, CT may be artifactual - that said - If significant worsening consider repeat stroke alert, CTA and CT perfusion study  Till then admit to [facility]                     Stroke Assessment     Row Name 05/08/22 2344             NIH Stroke Scale    Interval --      Level of Consciousness (1a ) 0      LOC Questions (1b ) 0      LOC Commands (1c ) 0      Best Gaze (2 ) 0      Visual (3 ) 0      Facial Palsy (4 ) 0      Motor Arm, Left (5a ) 1      Motor Arm, Right (5b ) 0      Motor Leg, Left (6a ) 0      Motor Leg, Right (6b ) 0      Limb Ataxia (7 ) 0      Sensory (8 ) 0      Best Language (9 ) 0      Dysarthria (10 ) 0      Extinction and Inattention (11 ) (Formerly Neglect) 0      Total 1                Most Recent Value   TPA Decision Options    TPA Decision Patient not a TPA candidate  SBIRT 22yo+      Most Recent Value   SBIRT (22 yo +)    In order to provide better care to our patients, we are screening all of our patients for alcohol and drug use  Would it be okay to ask you these screening questions? No Filed at: 05/08/2022 9007                    MDM    Disposition  Final diagnoses:   Weakness     Time reflects when diagnosis was documented in both MDM as applicable and the Disposition within this note     Time User Action Codes Description Comment    5/8/2022 10:39 PM Tamie Read Add [R53 1] Weakness       ED Disposition     None      Follow-up Information    None         Patient's Medications   Discharge Prescriptions    No medications on file       No discharge procedures on file      PDMP Review     None          ED Provider  Electronically Signed by           Kelly Chan MD  05/08/22 3263 Olivia Fernández MD  05/08/22 7752

## 2022-05-09 NOTE — ED NOTES
Pt transported to [address] on tele, receiving RN aware of pt arrival      Shreya Strauss  05/09/22 3359

## 2022-05-09 NOTE — CONSULTS
NEUROLOGY RESIDENCY CONSULT NOTE     Name: Randy Smith  Age & Sex: 74 y o  male   MRN: [id number]  Unit/Bed#: S -01   Encounter: [id number]  Length of Stay: 1    ASSESSMENT & PLAN     * Stroke-like symptoms  Assessment & Plan  Stroke alert on 5/8/2022 10:24 PM with initial NIHSS of 1 and LKW 1900, initial Blood Pressure: 169/80  Initial presenting deficits were left upper extremity weakness  As a result of low score pt was determined to not be a candidate for tPA   Exam on 05/09/22: resolved back to baseline   Current Blood Pressure: 162/78, BP over 24 hours: BP  Min: 125/58  Max: 169/80    Home meds: no AP/AC at home    Workup:  Lab Results   Component Value Date    HGBA1C 5 9 (H) 05/08/2022    CHOLESTEROL 206 (H) 05/08/2022    LDLCALC 141 (H) 05/08/2022    TRIG 163 (H) 05/08/2022    INR 0 87 05/08/2022       CTH: no acute findings   CTA: did not receive due to contrast shortage   MRI: pending   Echocardiogram: LVEF 59%, grad 1 diastolic dysfunction, no LA dilation   Telemetry: no events    Pertinent scores:  - NIHSS: 1  Stroke Modified Ct Score: 0 (No baseline symptoms/disability)    Impression: TIA versus stroke, imaging is still pending    Plan:  - Stroke pathway  - Discussed plan with neurology attending, Dr Shu Hoffmann  - BP: Permissive hypertension for first 24 hours up to 220 SBP  - Obtain lipids and A1c  - atorvastatin 40mg qhs  - Maintain glucose <180, SSI for coverage if indicated  - Medical management as per primary team appreciated  - Antiplatelet agents: ASA 81 mg, Plavix 75 mg, continue for 21 days and then discontinue plavix and continue ASA indefinitely  - MRI brain pending  - DVT ppx and SCDs  - Monitor on telemetry  - PT/OT/Speech/PM&R input appreciated      Randy Smith  will need follow up in in 6 weeks with neurovascular attending, ap, resident  He will not require outpatient neurological testing        SUBJECTIVE     Reason for Consult / Principal Problem: stroke alert  Hx and PE limited by: nothing    Mandy Wilson  is a 74 y o  male who [facility] on 5/8/2022 2224 as a stroke alert; symptoms reportedly started 3 5 hours prior at [facility] when he initially felt dizzy (lightheaded) and off balance and then fell twice  Initial presenting deficits were left upper extremity weakness and BP was 169/80  He has a past medical history of HTN, HLD, preDM  He does not take AC/AP at home  On presentation in the ED, He had left upper extremity weakness according to original stroke alert note with NIH of 1  CTH demonstrated no acute findings and CTA was not completed due to contrast shortage  Since admission, his symptoms have resolved  He has no residual symptoms and has full strength  He is still waiting on MRI brain with MRA to rule out stroke as cause of his symptoms  Differentials include but not limited to TIA versus stroke  Inpatient consult to Neurology  Consult performed by: Naheed Rodriguez MD  Consult ordered by: CARLOS ALBERTO Wallace          Historical Information   Past Medical History:   Diagnosis Date    Elevated cholesterol with high triglycerides      Past Surgical History:   Procedure Laterality Date    HERNIA REPAIR Left     inguinal    WI COLONOSCOPY FLX DX W/COLLJ SPEC WHEN PFRMD N/A 2/24/2017    Procedure: COLONOSCOPY;  Surgeon: Angelo Dowd MD;  Location: [facility];   Service: Colorectal     Social History   Social History     Substance and Sexual Activity   Alcohol Use Yes    Comment: 1-2 per week; DENIED: HISTORY OF ALCOHOL USE AS PER ALL SCRIPTS     Social History     Substance and Sexual Activity   Drug Use No     E-Cigarette/Vaping    E-Cigarette Use Never User      E-Cigarette/Vaping Substances    Nicotine No     THC No     CBD No     Flavoring No     Other No     Unknown No      Social History     Tobacco Use   Smoking Status Never Smoker   Smokeless Tobacco Never Used     Family History: non-contributory  Meds/Allergies   all current active meds have been reviewed  Allergies   Allergen Reactions    Penicillins Rash       Review of previous medical records was completed  Review of Systems   Constitutional: Negative for fever  Eyes: Negative for visual disturbance  Respiratory: Negative for shortness of breath  Cardiovascular: Negative for chest pain, palpitations and leg swelling  Gastrointestinal: Negative for abdominal pain  Musculoskeletal: Negative for back pain  Neurological: Positive for dizziness, weakness and light-headedness  Negative for numbness and headaches  Psychiatric/Behavioral: Negative for agitation  The patient is not nervous/anxious  OBJECTIVE     Patient ID: Aviva Leyva  is a 74 y o  male  Vitals:   Vitals:    05/09/22 0700 05/09/22 0934 05/09/22 1100 05/09/22 1305   BP: 163/79 (!) 168/125 162/78 162/78   BP Location:  Right arm Right arm    Pulse: 62 76 72 72   Resp: 18 16 18    Temp:   97 6 °F (36 4 °C)    TempSrc:   Oral    SpO2: 95% 95% 97%    Weight:    113 kg (250 lb)   Height:    6' (1 829 m)      Body mass index is 33 91 kg/m²  Intake/Output Summary (Last 24 hours) at 5/9/2022 1637  Last data filed at 5/9/2022 1414  Gross per 24 hour   Intake 180 ml   Output 0 ml   Net 180 ml       Physical Exam  Vitals and nursing note reviewed  Constitutional:       Appearance: He is well-developed  HENT:      Head: Normocephalic and atraumatic  Eyes:      Extraocular Movements: EOM normal       Conjunctiva/sclera: Conjunctivae normal       Pupils: Pupils are equal, round, and reactive to light  Cardiovascular:      Rate and Rhythm: Normal rate and regular rhythm  Heart sounds: Normal heart sounds  No murmur heard  Pulmonary:      Effort: Pulmonary effort is normal       Breath sounds: Normal breath sounds  Musculoskeletal:      Cervical back: Normal range of motion and neck supple  Skin:     General: Skin is warm and dry  Capillary Refill: Capillary refill takes less than 2 seconds  Neurological:      Mental Status: He is oriented to person, place, and time  Coordination: Finger-Nose-Finger Test and Heel to NIX [facility] Test normal       Deep Tendon Reflexes: Strength normal    Psychiatric:         Speech: Speech normal           Neurologic Exam     Mental Status   Oriented to person, place, and time  Attention: normal  Concentration: normal    Speech: speech is normal   Level of consciousness: alert  Knowledge: good  Normal comprehension  Cranial Nerves     CN II   Visual fields full to confrontation  CN III, IV, VI   Pupils are equal, round, and reactive to light  Extraocular motions are normal      CN V   Facial sensation intact  CN VII   Facial expression full, symmetric  CN VIII   CN VIII normal      CN IX, X   CN IX normal    CN X normal      CN XI   CN XI normal      CN XII   CN XII normal      Motor Exam   Muscle bulk: normal  Overall muscle tone: normal  Right arm pronator drift: absent  Left arm pronator drift: absent    Strength   Strength 5/5 throughout  Sensory Exam   Light touch normal    Pinprick normal      Gait, Coordination, and Reflexes     Coordination   Finger to nose coordination: normal  Heel to shin coordination: normal    Reflexes   Reflexes 2+ except as noted  Right plantar: normal  Left plantar: normal       LABORATORY DATA     Labs: I have personally reviewed pertinent reports      Results from last 7 days   Lab Units 05/09/22  0424 05/08/22  2244   WBC Thousand/uL 8 99 8 55   HEMOGLOBIN g/dL 15 3 15 7   HEMATOCRIT % 46 6 46 4   PLATELETS Thousands/uL 245 248   NEUTROS PCT % 76*  --    MONOS PCT % 7  --       Results from last 7 days   Lab Units 05/09/22  0424 05/08/22  2244   POTASSIUM mmol/L 3 9 4 3   CHLORIDE mmol/L 109* 108   CO2 mmol/L 24 23   BUN mg/dL 12 14   CREATININE mg/dL 0 87 0 93   CALCIUM mg/dL 9 3 9 3              Results from last 7 days   Lab Units 05/08/22  2244 INR  0 87   PTT seconds 32               IMAGING & DIAGNOSTIC TESTING     Radiology Results: I have personally reviewed pertinent films in PACS  XR chest 1 view portable   Final Result by Mahendra Macias MD (05/09 1718)      No acute cardiopulmonary disease  Workstation performed: [id number]         CT stroke alert brain   Final Result by Landon Wilson MD (05/08 8413)      No acute territorial infarct, hemorrhage, or midline shift  Asymmetric hyperattenuated appearance distal right M1/perisylvian branches of the right MCA suspicious for acute thrombus given patient symptomatology  Findings were directly discussed with Dr Jeff Lee at 11:15 PM       Workstation performed: [id number]         MRI brain wo contrast    (Results Pending)   MRA head wo contrast    (Results Pending)   MRA carotids wo contrast    (Results Pending)       Other Diagnostic Testing: I have personally reviewed pertinent reports  ACTIVE MEDICATIONS     Current Facility-Administered Medications   Medication Dose Route Frequency    aspirin chewable tablet 81 mg  81 mg Oral Daily    atorvastatin (LIPITOR) tablet 40 mg  40 mg Oral QPM    [START ON 5/10/2022] clopidogrel (PLAVIX) tablet 75 mg  75 mg Oral Daily    enoxaparin (LOVENOX) subcutaneous injection 40 mg  40 mg Subcutaneous Daily    fenofibrate (TRICOR) tablet 48 mg  48 mg Oral Daily       Prior to Admission medications    Medication Sig Start Date End Date Taking?  Authorizing Provider   amLODIPine (NORVASC) 5 mg tablet Take 1 tablet (5 mg total) by mouth daily 9/22/21  Yes Steve Wilson MD   fenofibrate (TRICOR) 48 mg tablet Take 1 tablet (48 mg total) by mouth daily 3/2/22 2/25/23 Yes Steve Wilson MD       CODE STATUS & ADVANCED DIRECTIVES     Code Status: Level 1 - Full Code  Advance Directive and Living Will:      Power of :    POLST:      ==  MD Caitlin Wakefield [facility] Neurology Residency, PGY-3

## 2022-05-09 NOTE — PHYSICAL THERAPY NOTE
PHYSICAL THERAPY EVALUATION NOTE    Patient Name: Fatoumata Bustillo  Today's Date: 5/9/2022  AGE:   74 y o  Mrn:   [id number]  ADMIT DX:  Weakness [R53 1]  Stroke-like symptoms [R29 90]  Stroke-like episode [R29 90]    Past Medical History:   Diagnosis Date    Elevated cholesterol with high triglycerides      Length Of Stay: 1  PHYSICAL THERAPY EVALUATION :    05/09/22 1423   PT Last Visit   PT Visit Date 05/09/22   Pain Assessment   Pain Assessment Tool 0-10   Pain Score No Pain   Restrictions/Precautions   Other Precautions Chair Alarm; Bed Alarm;Multiple lines; Fall Risk;Hard of hearing   Home Living   Type of [address] [place] [address] Two level; Other (Comment)  (2nd floor bedroom, 1st floor bathroom  3 FABRICE)   Additional Comments lives w/ spouse  ambulates w/o device  owns cane  independent w/ ADLs and driving  1 fall in last 6 months  Prior Function   Comments pt initially was wearing oxygen via nasal cannula but Catarina NSG stated to remove it  room air resting pulse ox 98% and 73 BPM   General   Additional Pertinent History 5/9/22 at 9:34, blood pressure was 168/125   Family/Caregiver Present Yes   Cognition   Arousal/Participation Alert   Orientation Level Oriented to person; Other (Comment)  (pt was identified w/ full name, birth date)   Following Commands Follows one step commands without difficulty   Comments pt had decreased awareness of left side  Subjective   Subjective pt seen supine in bed w/ spouse and family present  pt agreed to PT eval  denied pain or dizziness     RUE Assessment   RUE Assessment WFL   LUE Assessment   LUE Assessment WFL   RLE Assessment   RLE Assessment WFL  (4- to 4/5)   LLE Assessment   LLE Assessment WFL  (4- to 4/5)   Coordination   Movements are Fluid and Coordinated 1   Finger to Nose & Finger to Finger  Intact   Heel to Shin Intact   Rapid Alternating Movements Intact   Light Touch   RLE Light Touch Grossly intact   LLE Light Touch Grossly intact   Bed Mobility   Supine to Sit 5  Supervision   Additional items HOB elevated; Increased time required;Verbal cues;LE management  (for trunk/LE positioning)   Additional Comments intermittent left trunk lean noted in seated and standing positions  pt noted being aware of it but had limited ability to correct consistently  Transfers   Sit to Stand 5  Supervision   Additional items Increased time required   Stand to Sit 5  Supervision   Additional items Increased time required;Verbal cues  (for body positioning)   Additional Comments pt stood completing weight shifts and advance/retreat 3x bilaterally w/ supervision  pt then ambulated as noted below  Ambulation/Elevation   Gait pattern Wide NIXON; Foward flexed   Gait Assistance 4  Minimal assist   Additional items Assist x 1;Verbal cues  (for full step length, safety)   Assistive Device None   Distance 20 feet  seated rest break  40 feet   Stair Management Assistance Not tested   Balance   Static Sitting Fair +   Static Standing Fair -   Ambulatory Poor +   Activity Tolerance   Activity Tolerance Patient limited by fatigue   Nurse Made Aware spoke to [facility], Sami OTLuis Alberto CM, Joby Shea AP   Assessment   Prognosis Good   Problem List Decreased strength;Decreased endurance; Impaired balance;Decreased mobility; Decreased safety awareness; Impaired vision;Obesity   Assessment Pt presents with complaints of transient dizziness, lightheadedness and 2 episodes of left-sided ataxia  Dx: stroke-like symptoms and essential HTN  order placed for PT eval and tx  pt presents w/ comorbidities of HTN and hypercholesterolemia and personal factors of advanced age, living in 2 story house, stair(s) to enter home, positive fall history and visual impairments  pt presents w/ weakness, decreased endurance, impaired balance, gait deviations, visual impairment, decreased safety awareness and fall risk   these impairments are evident in findings from physical examination (weakness and impaired vision), mobility assessment (need for standby to min assist w/ all phases of mobility when usually mobilizing independently, tolerance to only 50 feet of ambulation and need for cueing for mobility technique), and Barthel Index: 75/100  pt needed input for mobility technique and safety  pt is at risk for falls due to physical and safety awareness deficits  pt's clinical presentation is unstable/unpredictable (evident in poor blood pressure control, need for assist w/ all phases of mobility when usually mobilizing independently, tolerance to only 50 feet of ambulation and need for input for mobility technique/safety)  pt needs inpatient PT tx to improve mobility deficits and progress mobility training as appropriate  discharge recommendation is for outpatient PT to reduce fall risk and maximize level of functional independence  Goals   Patient Goals resume a normal life, babysit my granddaughter  STG Expiration Date 05/19/22   Short Term Goal #1 pt will: Increase bilateral LE strength 1/2 grade to facilitate independent mobility, Perform bed mobility independently to increase level of independence, Perform all transfers independently to improve independence, Ambulate 300 ft  with least restrictive assistive device independently w/o LOB to improve functional independence, Navigate 10 stair(s) independently with unilateral handrail to facilitate return to previous living environment, Increase ambulatory balance 1 grade to decrease risk for falls, Complete exercise program independently to increase strength and endurance, Tolerate 3 hr OOB to faciliate upright tolerance, Improve gait speed to 0 60 m/s to reduce fall risk and increase independence and Improve Barthel Index score to 95 or greater to facilitate independence   PT Treatment Day 1   Plan   Treatment/Interventions Functional transfer training;LE strengthening/ROM; Elevations; Therapeutic exercise;Patient/family training; Endurance training;Equipment eval/education; Bed mobility;Gait training   PT Frequency Other (Comment)  (5x/week)   Recommendation   PT Discharge Recommendation Home with outpatient rehabilitation   Additional Comments recommend single point cane use w/ mobility   AM-PAC Basic Mobility Inpatient   Turning in Bed Without Bedrails 4   Lying on Back to Sitting on Edge of Flat Bed 3   Moving Bed to Chair 3   Standing Up From Chair 3   Walk in Room 3   Climb 3-5 Stairs 1   Basic Mobility Inpatient Raw Score 17   Basic Mobility Standardized Score 39 67   Highest Level Of Mobility   -HL Goal 5: Stand one or more mins   -HLM Highest Level of Mobility 7: Walk 25 feet or more   JH-HLM Goal Achieved Yes   Barthel Index   Feeding 10   Bathing 0   Grooming Score 5   Dressing Score 10   Bladder Score 10   Bowels Score 10   Toilet Use Score 10   Transfers (Bed/Chair) Score 10   Mobility (Level Surface) Score 10   Stairs Score 0   Barthel Index Score 75   Additional Treatment Session   Start Time 1423   End Time 1500   Treatment Assessment Pt agreed to participate in PT intervention  Therapist introduced use of full length mirror to address left trunk lean  pt stood in front of mirror and completed bilateral advance/retreat  3x bilaterally and walked 1 step forward/backward w/ supervision  pt continues to have left lean  pt ambulates 100 feet w/o assistive device w/ minx1  standing rest break  Therapist introduced single point cane use w/ ambulation to improve gait stability  pt ambulated 140 feet w/ single point cane w/ supervision  Comfortable Gait Speed w/ cane was 0 50 m/s  Pt was noted to have improvement w/ use of cane w/  increased ambulation distance and decreased level of assist to maintain safety  continued inpatient PT tx is indicated to reduce fall risk     Equipment Use single point cane, full length mirror   Additional Treatment Day 1   End of Consult   Patient Position at End of Consult Bedside chair;Bed/Chair alarm activated; All needs within reach     Comfortable Gait Speed w/ cane: 0 50 m/s  Gait Speed Interpretation:  Gain of 0 1 m/s is a predictor of well-being in those w/ abnormal walking speed compared to age-patched peers  <0 7m/s is at an increased risk for falls    Household ambulator: <0 4 m/s  Limited community ambulator: 0 4-0 8 m/s  [employer] ambulator: 0 8-1 2 m/s  Able to safely cross streets: >1 2 m/s    The patient's AM-PAC Basic Mobility Inpatient Short Form Raw Score is 17  A Raw score of greater than 16 suggests the patient may benefit from discharge to home  Please also refer to the recommendation of the Physical Therapist for safe discharge planning  Skilled PT recommended while in hospital and upon DC to progress pt toward treatment goals       Sundeep Macias, PT

## 2022-05-10 PROBLEM — I63.9 ISCHEMIC STROKE (HCC): Status: ACTIVE | Noted: 2022-05-09

## 2022-05-10 LAB
ANION GAP SERPL CALCULATED.3IONS-SCNC: 9 MMOL/L (ref 4–13)
BUN SERPL-MCNC: 12 MG/DL (ref 5–25)
CALCIUM SERPL-MCNC: 8.6 MG/DL (ref 8.4–10.2)
CHLORIDE SERPL-SCNC: 107 MMOL/L (ref 96–108)
CO2 SERPL-SCNC: 23 MMOL/L (ref 21–32)
CREAT SERPL-MCNC: 0.86 MG/DL (ref 0.6–1.3)
ERYTHROCYTE [DISTWIDTH] IN BLOOD BY AUTOMATED COUNT: 12.8 % (ref 11.6–15.1)
GFR SERPL CREATININE-BSD FRML MDRD: 89 ML/MIN/1.73SQ M
GLUCOSE SERPL-MCNC: 114 MG/DL (ref 65–140)
HCT VFR BLD AUTO: 46.2 % (ref 36.5–49.3)
HGB BLD-MCNC: 15.6 G/DL (ref 12–17)
MCH RBC QN AUTO: 30.5 PG (ref 26.8–34.3)
MCHC RBC AUTO-ENTMCNC: 33.8 G/DL (ref 31.4–37.4)
MCV RBC AUTO: 90 FL (ref 82–98)
PLATELET # BLD AUTO: 218 THOUSANDS/UL (ref 149–390)
PMV BLD AUTO: 9.8 FL (ref 8.9–12.7)
POTASSIUM SERPL-SCNC: 3.8 MMOL/L (ref 3.5–5.3)
RBC # BLD AUTO: 5.12 MILLION/UL (ref 3.88–5.62)
SODIUM SERPL-SCNC: 139 MMOL/L (ref 135–147)
TSH SERPL DL<=0.05 MIU/L-ACNC: 2.85 UIU/ML (ref 0.45–4.5)
WBC # BLD AUTO: 9.22 THOUSAND/UL (ref 4.31–10.16)

## 2022-05-10 PROCEDURE — 99233 SBSQ HOSP IP/OBS HIGH 50: CPT | Performed by: PSYCHIATRY & NEUROLOGY

## 2022-05-10 PROCEDURE — 84443 ASSAY THYROID STIM HORMONE: CPT | Performed by: INTERNAL MEDICINE

## 2022-05-10 PROCEDURE — 80048 BASIC METABOLIC PNL TOTAL CA: CPT | Performed by: INTERNAL MEDICINE

## 2022-05-10 PROCEDURE — 85027 COMPLETE CBC AUTOMATED: CPT | Performed by: INTERNAL MEDICINE

## 2022-05-10 PROCEDURE — 99232 SBSQ HOSP IP/OBS MODERATE 35: CPT | Performed by: INTERNAL MEDICINE

## 2022-05-10 RX ORDER — AMLODIPINE BESYLATE 5 MG/1
5 TABLET ORAL DAILY
Status: DISCONTINUED | OUTPATIENT
Start: 2022-05-10 | End: 2022-05-17 | Stop reason: HOSPADM

## 2022-05-10 RX ORDER — LANOLIN ALCOHOL/MO/W.PET/CERES
3 CREAM (GRAM) TOPICAL
Status: DISCONTINUED | OUTPATIENT
Start: 2022-05-10 | End: 2022-05-17 | Stop reason: HOSPADM

## 2022-05-10 RX ORDER — LISINOPRIL 5 MG/1
5 TABLET ORAL DAILY
Status: DISCONTINUED | OUTPATIENT
Start: 2022-05-10 | End: 2022-05-10

## 2022-05-10 RX ADMIN — ENOXAPARIN SODIUM 40 MG: 40 INJECTION SUBCUTANEOUS at 08:50

## 2022-05-10 RX ADMIN — ASPIRIN 81 MG CHEWABLE TABLET 81 MG: 81 TABLET CHEWABLE at 08:50

## 2022-05-10 RX ADMIN — FENOFIBRATE 48 MG: 48 TABLET, FILM COATED ORAL at 08:50

## 2022-05-10 RX ADMIN — CLOPIDOGREL BISULFATE 75 MG: 75 TABLET ORAL at 08:50

## 2022-05-10 RX ADMIN — AMLODIPINE BESYLATE 5 MG: 5 TABLET ORAL at 15:35

## 2022-05-10 RX ADMIN — ATORVASTATIN CALCIUM 40 MG: 40 TABLET, FILM COATED ORAL at 17:42

## 2022-05-10 RX ADMIN — Medication 3 MG: at 23:55

## 2022-05-10 NOTE — PROGRESS NOTES
NEUROLOGY RESIDENCY PROGRESS NOTE     Name: Jono White  Age & Sex: 74 y o  male   MRN: [id number]  Unit/Bed#: S -01   Encounter: [id number]    ASSESSMENT & PLAN     * Stroke-like symptoms  Assessment & Plan  Stroke alert on 5/8/2022 10:24 PM with initial NIHSS of 1 and LKW 1900, initial Blood Pressure: 169/80  Initial presenting deficits were left upper extremity weakness  As a result of low score pt was determined to not be a candidate for tPA      Exam on 05/10/22: resolved back to baseline, very minimal drift of the LUE   Current Blood Pressure: 144/83, BP over 24 hours: BP  Min: 141/71  Max: 170/83    Home meds: no AP/AC at home    Workup:  Lab Results   Component Value Date    HGBA1C 5 9 (H) 05/08/2022    CHOLESTEROL 206 (H) 05/08/2022    LDLCALC 141 (H) 05/08/2022    TRIG 163 (H) 05/08/2022    INR 0 87 05/08/2022       CTH: no acute findings   CTA: did not receive due to contrast shortage   MRI: R hemispheric infarcts watershed territory, MRA head and neck showing R ICA and R MCA occlusions and L vertebral artery stenosis/occlusion   Echocardiogram: LVEF 38%, grad 1 diastolic dysfunction, no LA dilation   Telemetry: no events    Pertinent scores:  - NIHSS: 1  Stroke Modified [place] Score: 0 (No baseline symptoms/disability)    Impression: LVO stroke 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    Plan:  - Stroke pathway  - Discussed plan with neurology attending, Dr Jutsine Lowery  - BP: -160 allow for some amount of permissve HTN due to stenosis of numerous intracranial vessels  - Obtain lipids and A1c  - atorvastatin 40mg qhs  - Maintain glucose <180, SSI for coverage if indicated  - Medical management as per primary team appreciated  - Antiplatelet agents: ASA 81 mg, Plavix 75 mg and continue for 3 months due to intracranial athero  - LUCY  - DVT ppx and SCDs  - Monitor on telemetry  - PT/OT/Speech/PM&R input appreciated   - Pt NPO at mightnight tonight for hopeful LUCY tomorrow to rule out central etiology of several LVO      Jennifer Burgos  will need follow up in in 4 weeks with neurovascular attending or AP  He will not require outpatient neurological testing  SUBJECTIVE     Patient was seen and examined  No acute events overnight  Changes to symptoms: feels improved, left drift improved  Medication changes: none  Mood and sleep: no issues  MRI, MRA head and neck demonstrating 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 as well as left vertebral artery occlusion  No remarkable labs  Stable VS    10 point review of systems conducted and otherwise negative other than as documented above      OBJECTIVE     Patient ID: Jennifer Burgos  is a 74 y o  male  Vitals:    22 2100 05/10/22 0300 05/10/22 0700 05/10/22 1100   BP: 158/74 141/71 162/82 144/83   BP Location: Right arm Left arm Right arm Right arm   Pulse: 65 66 70 76   Resp: 18 18 18    Temp: (!) 97 4 °F (36 3 °C)  98 4 °F (36 9 °C)    TempSrc: Oral  Oral    SpO2: 94%  95% 95%   Weight:       Height:          Temperature:   Temp (24hrs), Av 9 °F (36 6 °C), Min:97 4 °F (36 3 °C), Max:98 4 °F (36 9 °C)    Temperature: 98 4 °F (36 9 °C)    Neurologic Exam     Mental Status   Oriented to person, place, and time  Attention: normal  Concentration: normal    Speech: speech is normal   Level of consciousness: alert  Knowledge: good  Normal comprehension  Cranial Nerves     CN II   Visual fields full to confrontation  CN III, IV, VI   Pupils are equal, round, and reactive to light  Extraocular motions are normal      CN V   Facial sensation intact  CN VII   Facial expression full, symmetric  CN VIII   CN VIII normal      CN IX, X   CN IX normal    CN X normal      CN XI   CN XI normal      CN XII   CN XII normal      Motor Exam   Muscle bulk: normal  Overall muscle tone: normal  Right arm pronator drift: absent  Left arm pronator drift: present (very mild)    Strength   Strength 5/5 except as noted  Gait, Coordination, and Reflexes     Coordination   Finger to nose coordination: normal    Reflexes   Reflexes 2+ except as noted  LABORATORY DATA     Labs: I have personally reviewed pertinent reports  Results from last 7 days   Lab Units 05/10/22  0342 05/09/22  0424 05/08/22  2244   WBC Thousand/uL 9 22 8 99 8 55   HEMOGLOBIN g/dL 15 6 15 3 15 7   HEMATOCRIT % 46 2 46 6 46 4   PLATELETS Thousands/uL 218 245 248   NEUTROS PCT %  --  76*  --    MONOS PCT %  --  7  --       Results from last 7 days   Lab Units 05/10/22  0342 05/09/22  0424 05/08/22  2244   POTASSIUM mmol/L 3 8 3 9 4 3   CHLORIDE mmol/L 107 109* 108   CO2 mmol/L 23 24 23   BUN mg/dL 12 12 14   CREATININE mg/dL 0 86 0 87 0 93   CALCIUM mg/dL 8 6 9 3 9 3              Results from last 7 days   Lab Units 05/08/22  2244   INR  0 87   PTT seconds 32               IMAGING & DIAGNOSTIC TESTING     Radiology Results: I have personally reviewed pertinent films in PACS      Other Diagnostic Testing: I have personally reviewed pertinent reports  ACTIVE MEDICATIONS     Current Facility-Administered Medications   Medication Dose Route Frequency    aspirin chewable tablet 81 mg  81 mg Oral Daily    atorvastatin (LIPITOR) tablet 40 mg  40 mg Oral QPM    clopidogrel (PLAVIX) tablet 75 mg  75 mg Oral Daily    enoxaparin (LOVENOX) subcutaneous injection 40 mg  40 mg Subcutaneous Daily    fenofibrate (TRICOR) tablet 48 mg  48 mg Oral Daily       Prior to Admission medications    Medication Sig Start Date End Date Taking?  Authorizing Provider   amLODIPine (NORVASC) 5 mg tablet Take 1 tablet (5 mg total) by mouth daily 9/22/21  Yes Julio Betancur MD   fenofibrate (TRICOR) 48 mg tablet Take 1 tablet (48 mg total) by mouth daily 3/2/22 2/25/23 Yes Julio Betancur MD       ==  MD Caitlin Walsh [facility] Neurology Residency, PGY-3

## 2022-05-10 NOTE — ASSESSMENT & PLAN NOTE
· Blood pressure slightly elevated, patient on amlodipine 5 mg daily at home that was on hold for permissive hypertension  · Restart patient's home medication with amlodipine and further monitor his blood pressure, we might consider add another agent if blood pressure is persistently elevated    · Closely monitor blood pressure  · Patient will need tight blood pressure control given his CV event

## 2022-05-10 NOTE — ASSESSMENT & PLAN NOTE
· Patient was on fenofibrate at home  · Lipid panel:  Cholesterol 206, triglycerides 163, HDL 32,   · In the setting of acute CVA patient will need to be on high-intensity statin, started on Lipitor 40 mg daily

## 2022-05-10 NOTE — ASSESSMENT & PLAN NOTE
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  Recent right hemisphere internal /deep watershed infarcts as well as frontoparietotemporal cortical infarcts   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 with patent, but diminished right middle cerebral artery flow  Evidence of at least distal left vertebral artery occlusion   MRA Carotids :Occluded right cervical and visualized intracranial internal carotid artery   Given the distribution of the patient ischemic stroke, high suspicious for embolic etiology, need to rule out cardiac etiology   Echocardiogram showed LVEF 65% with grade 1 diastolic dysfunction, plan for LUCY tomorrow   PT/OT eval with recommendations for home with outpatient physical therapy   Speech evaluation was unremarkable   Hemoglobin A1c 5 9 puts the patient in a pre diabetic range  Encouraged lifestyle modifications   Continue monitor in tele, no reported events   Patient might require vascular surgery evaluation given his significant vascular disease, he will also might require to be on dual antiplatelet therapy for longer duration  Will further discuss with Neurology about final recommendations

## 2022-05-10 NOTE — PROGRESS NOTES
[facility]  Progress Note - Fanny Martinez  1955, 74 y o  male MRN: [id number]  Unit/Bed#: S -01 Encounter: [id number]  Primary Care Provider: Yasmin Pinto MD   Date and time admitted to hospital: 5/8/2022 10:24 PM    * Ischemic stroke 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  Recent right hemisphere internal /deep watershed infarcts as well as frontoparietotemporal cortical infarcts   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 with patent, but diminished right middle cerebral artery flow  Evidence of at least distal left vertebral artery occlusion   MRA Carotids :Occluded right cervical and visualized intracranial internal carotid artery   Given the distribution of the patient ischemic stroke, high suspicious for embolic etiology, need to rule out cardiac etiology   Echocardiogram showed LVEF 65% with grade 1 diastolic dysfunction, plan for LUCY tomorrow   PT/OT eval with recommendations for home with outpatient physical therapy   Speech evaluation was unremarkable   Hemoglobin A1c 5 9 puts the patient in a pre diabetic range  Encouraged lifestyle modifications     Continue monitor in tele, no reported events   Patient might require vascular surgery evaluation given his significant vascular disease, he will also might require to be on dual antiplatelet therapy for longer duration  Will further discuss with Neurology about final recommendations  Hypertension, essential  Assessment & Plan  · Blood pressure slightly elevated, patient on amlodipine 5 mg daily at home that was on hold for permissive hypertension  · Restart patient's home medication with amlodipine and further monitor his blood pressure, we might consider add another agent if blood pressure is persistently elevated  · Closely monitor blood pressure  · Patient will need tight blood pressure control given his CV event       Hyperlipidemia  Assessment & Plan  · Patient was on fenofibrate at home  · Lipid panel:  Cholesterol 206, triglycerides 163, HDL 32,   · In the setting of acute CVA patient will need to be on high-intensity statin, started on Lipitor 40 mg daily  VTE Pharmacologic Prophylaxis: VTE Score: 3 Moderate Risk (Score 3-4) - Pharmacological DVT Prophylaxis Ordered: enoxaparin (Lovenox)  Patient Centered Rounds: I performed bedside rounds with nursing staff today  Discussions with Specialists or Other Care Team Provider:  Neurology, case management    Education and Discussions with Family / Patient: Updated  (wife) at bedside  Current Length of Stay: 2 day(s)  Current Patient Status: Inpatient   Discharge Plan: Anticipate discharge tomorrow to home  Code Status: Level 1 - Full Code    Subjective:   Patient was sitting and recliner completely awake alert and oriented, denies any weakness, chest pain, palpitations, lightheadedness or dizziness, he reports that he was ambulating with no issues  Objective:     Vitals:   Temp (24hrs), Av 9 °F (36 6 °C), Min:97 4 °F (36 3 °C), Max:98 4 °F (36 9 °C)    Temp:  [97 4 °F (36 3 °C)-98 4 °F (36 9 °C)] 98 4 °F (36 9 °C)  HR:  [65-76] 76  Resp:  [18] 18  BP: (141-170)/(71-83) 144/83  SpO2:  [94 %-96 %] 95 %  Body mass index is 33 91 kg/m²  Input and Output Summary (last 24 hours):      Intake/Output Summary (Last 24 hours) at 5/10/2022 1336  Last data filed at 5/10/2022 1325  Gross per 24 hour   Intake 300 ml   Output 250 ml   Net 50 ml       Physical Exam:   Physical Exam  Vitals and nursing note reviewed  Constitutional:       Appearance: He is well-developed  HENT:      Head: Normocephalic and atraumatic  Eyes:      Conjunctiva/sclera: Conjunctivae normal       Pupils: Pupils are equal, round, and reactive to light  Cardiovascular:      Rate and Rhythm: Normal rate and regular rhythm  Heart sounds: Normal heart sounds  No murmur heard  Pulmonary:      Effort: Pulmonary effort is normal  No respiratory distress  Breath sounds: Normal breath sounds  Abdominal:      Palpations: Abdomen is soft  Tenderness: There is no abdominal tenderness  Musculoskeletal:      Cervical back: Normal range of motion and neck supple  Skin:     General: Skin is warm and dry  Capillary Refill: Capillary refill takes less than 2 seconds  Neurological:      Mental Status: He is alert and oriented to person, place, and time  Coordination: Finger-Nose-Finger Test normal    Psychiatric:         Speech: Speech normal         Additional Data:     Labs:  Results from last 7 days   Lab Units 05/10/22  0342 05/09/22  0424 05/09/22  0424   WBC Thousand/uL 9 22   < > 8 99   HEMOGLOBIN g/dL 15 6   < > 15 3   HEMATOCRIT % 46 2   < > 46 6   PLATELETS Thousands/uL 218   < > 245   NEUTROS PCT %  --   --  76*   LYMPHS PCT %  --   --  14   MONOS PCT %  --   --  7   EOS PCT %  --   --  1    < > = values in this interval not displayed       Results from last 7 days   Lab Units 05/10/22  0342   SODIUM mmol/L 139   POTASSIUM mmol/L 3 8   CHLORIDE mmol/L 107   CO2 mmol/L 23   BUN mg/dL 12   CREATININE mg/dL 0 86   ANION GAP mmol/L 9   CALCIUM mg/dL 8 6   GLUCOSE RANDOM mg/dL 114     Results from last 7 days   Lab Units 05/08/22  2244   INR  0 87     Results from last 7 days   Lab Units 05/08/22  2237   POC GLUCOSE mg/dl 191* Results from last 7 days   Lab Units 05/08/22  2244   HEMOGLOBIN A1C % 5 9*           Lines/Drains:  Invasive Devices  Report    Peripheral Intravenous Line            Peripheral IV 05/08/22 Distal;Left;Upper;Ventral (anterior) Arm 1 day    Peripheral IV 05/08/22 Right Forearm 1 day                  Telemetry:  Telemetry Orders (From admission, onward)             48 Hour Telemetry Monitoring  Continuous x 48 hours        References:    Telemetry Guidelines   Question:  Reason for 48 Hour Telemetry  Answer:  Acute CVA (<24 hrs old, hemispheric strokes, selected brainstem strokes, cardiac arrhythmias)                 Telemetry Reviewed: Normal Sinus Rhythm  Indication for Continued Telemetry Use: Acute CVA             Imaging: Reviewed radiology reports from this admission including: CT head and MRI brain    Recent Cultures (last 7 days):         Last 24 Hours Medication List:   Current Facility-Administered Medications   Medication Dose Route Frequency Provider Last Rate    amLODIPine  5 mg Oral Daily Javier Reed MD      aspirin  81 mg Oral Daily CARLOS ALBERTO Carias      atorvastatin  40 mg Oral QPM CARLOS ALBERTO Carias      clopidogrel  75 mg Oral Daily Ramona Ruvalcaba MD      enoxaparin  40 mg Subcutaneous Daily Michelle VeronicaCARLOS ALBERTO brenner      fenofibrate  48 mg Oral Daily CARLOS ALBERTO Carias          Today, Patient Was Seen By: Javier Reed MD    **Please Note: This note may have been constructed using a voice recognition system  **

## 2022-05-10 NOTE — QUICK NOTE
Cardiology requested to perform LUCY for w/u of Rt hemispheric infarcts watershed territory  NPO after midnight  No noted contraindications  Patient agreeable to proceed  Spoke with him and wife

## 2022-05-10 NOTE — CASE MANAGEMENT
Case Management Assessment & Discharge Planning Note    Patient name Norton Najjar Location S /S -01 MRN [id number]  : 1955 Date 5/10/2022       Current Admission Date: 2022  Current Admission Diagnosis:Stroke-like symptoms   Patient Active Problem List    Diagnosis Date Noted    Stroke-like symptoms 2022    Hypertension, essential 2015    Hyperlipidemia 2012      LOS (days): 2  Geometric Mean LOS (GMLOS) (days): 2 20  Days to GMLOS:0 7     OBJECTIVE:    Risk of Unplanned Readmission Score: 7         Current admission status: Inpatient  Referral Reason: Stroke    Preferred Pharmacy:   [facility] [address] [place] [address]  Phone: [phone] Fax: [phone]    Primary Care Provider: Viki Staley MD    Primary Insurance: MEDICARE  Secondary Insurance: [facility] HEALTH OPTIONS PROGRAM    ASSESSMENT:  Active Health Care Proxies    There are no active Health Care Proxies on file  Patient Information  Admitted from[de-identified] Home  Mental Status: Alert  During Assessment patient was accompanied by: Spouse  Assessment information provided by[de-identified] Patient,Spouse  Primary Caregiver: Self  Support Systems: Spouse/significant other,Children  What city do you live in?: [facility] entry access options   Select all that apply : Stairs  Number of steps to enter home : 1  Type of Current Residence: [address] [place] [address]  Upon entering residence, is there a bedroom on the main floor (no further steps)?: Yes (Family moved bed to the first floor for DC)  Upon entering residence, is there a bathroom on the main floor (no further steps)?: Yes  In the last 12 months, was there a time when you were not able to pay the mortgage or rent on time?: No  In the last 12 months, how many places have you lived?: 1  In the last 12 months, was there a time when you did not have a steady place to sleep or slept in a shelter (including now)?: No  Homeless/housing insecurity resource given?: N/A  Living Arrangements: Lives w/ Spouse/significant other    Activities of Daily Living Prior to Admission  Functional Status: Independent  Completes ADLs independently?: Yes  Ambulates independently?: Yes  Does patient use assisted devices?: No  Does patient currently own DME?: Yes  What DME does the patient currently own?: Bedside Commode,Straight Cane  Does patient have a history of Outpatient Therapy (PT/OT)?: No  Does the patient have a history of Short-Term Rehab?: No  Does patient have a history of HHC?: No  Does patient currently have [facility]?: No    Patient Information Continued  Does patient have prescription coverage?: Yes  Within the past 12 months, you worried that your food would run out before you got the money to buy more : Never true  Within the past 12 months, the food you bought just didnt last and you didnt have money to get more : Never true  Food insecurity resource given?: N/A  Does patient receive dialysis treatments?: No  Does patient have a history of substance abuse?: No    Means of Transportation  Means of Transport to Appts[de-identified] Drives Self  In the past 12 months, has lack of transportation kept you from medical appointments or from getting medications?: No  In the past 12 months, has lack of transportation kept you from meetings, work, or from getting things needed for daily living?: No  Was application for public transport provided?: N/A    DISCHARGE DETAILS:    Discharge planning discussed with[de-identified] Patient and spouse  Freedom of Choice: Yes  Comments - Freedom of Choice: CM provided list of OPPT facilities at bedside per request of family  CM contacted family/caregiver?: Yes  Were Treatment Team discharge recommendations reviewed with patient/caregiver?: Yes     Were patient/caregiver advised of the risks associated with not following Treatment Team discharge recommendations?: Yes    Contacts  Patient Contacts: Patient and spouse  Relationship to Patient[de-identified] Family  Contact Method:  In Person  Reason/Outcome: Discharge Planning,Emergency Contact    Other Referral/Resources/Interventions Provided:  Interventions: Outpatient PT    IMM Given (Date):: 05/08/22

## 2022-05-11 ENCOUNTER — APPOINTMENT (INPATIENT)
Dept: CT IMAGING | Facility: HOSPITAL | Age: 67
DRG: 064 | End: 2022-05-11
Payer: MEDICARE

## 2022-05-11 ENCOUNTER — ANESTHESIA EVENT (INPATIENT)
Dept: NON INVASIVE DIAGNOSTICS | Facility: HOSPITAL | Age: 67
DRG: 064 | End: 2022-05-11
Payer: MEDICARE

## 2022-05-11 LAB
ANION GAP SERPL CALCULATED.3IONS-SCNC: 11 MMOL/L (ref 4–13)
BASOPHILS # BLD AUTO: 0.08 THOUSANDS/ΜL (ref 0–0.1)
BASOPHILS NFR BLD AUTO: 1 % (ref 0–1)
BUN SERPL-MCNC: 17 MG/DL (ref 5–25)
CALCIUM SERPL-MCNC: 9.6 MG/DL (ref 8.4–10.2)
CARDIAC TROPONIN I PNL SERPL HS: 3 NG/L (ref 8–18)
CHLORIDE SERPL-SCNC: 103 MMOL/L (ref 96–108)
CO2 SERPL-SCNC: 22 MMOL/L (ref 21–32)
CREAT SERPL-MCNC: 1.03 MG/DL (ref 0.6–1.3)
EOSINOPHIL # BLD AUTO: 0.24 THOUSAND/ΜL (ref 0–0.61)
EOSINOPHIL NFR BLD AUTO: 2 % (ref 0–6)
ERYTHROCYTE [DISTWIDTH] IN BLOOD BY AUTOMATED COUNT: 12.9 % (ref 11.6–15.1)
GFR SERPL CREATININE-BSD FRML MDRD: 74 ML/MIN/1.73SQ M
GLUCOSE SERPL-MCNC: 123 MG/DL (ref 65–140)
GLUCOSE SERPL-MCNC: 131 MG/DL (ref 65–140)
HCT VFR BLD AUTO: 50.4 % (ref 36.5–49.3)
HGB BLD-MCNC: 17.2 G/DL (ref 12–17)
IMM GRANULOCYTES # BLD AUTO: 0.08 THOUSAND/UL (ref 0–0.2)
IMM GRANULOCYTES NFR BLD AUTO: 1 % (ref 0–2)
LACTATE SERPL-SCNC: 2 MMOL/L (ref 0.5–2)
LYMPHOCYTES # BLD AUTO: 2.37 THOUSANDS/ΜL (ref 0.6–4.47)
LYMPHOCYTES NFR BLD AUTO: 18 % (ref 14–44)
MCH RBC QN AUTO: 30.2 PG (ref 26.8–34.3)
MCHC RBC AUTO-ENTMCNC: 34.1 G/DL (ref 31.4–37.4)
MCV RBC AUTO: 89 FL (ref 82–98)
MONOCYTES # BLD AUTO: 1.3 THOUSAND/ΜL (ref 0.17–1.22)
MONOCYTES NFR BLD AUTO: 10 % (ref 4–12)
NEUTROPHILS # BLD AUTO: 9.47 THOUSANDS/ΜL (ref 1.85–7.62)
NEUTS SEG NFR BLD AUTO: 68 % (ref 43–75)
NRBC BLD AUTO-RTO: 0 /100 WBCS
PLATELET # BLD AUTO: 302 THOUSANDS/UL (ref 149–390)
PMV BLD AUTO: 9.9 FL (ref 8.9–12.7)
POTASSIUM SERPL-SCNC: 4 MMOL/L (ref 3.5–5.3)
RBC # BLD AUTO: 5.69 MILLION/UL (ref 3.88–5.62)
SL CV LV EF: 65
SODIUM SERPL-SCNC: 136 MMOL/L (ref 135–147)
WBC # BLD AUTO: 13.54 THOUSAND/UL (ref 4.31–10.16)

## 2022-05-11 PROCEDURE — G1004 CDSM NDSC: HCPCS

## 2022-05-11 PROCEDURE — 93312 ECHO TRANSESOPHAGEAL: CPT

## 2022-05-11 PROCEDURE — 93312 ECHO TRANSESOPHAGEAL: CPT | Performed by: INTERNAL MEDICINE

## 2022-05-11 PROCEDURE — 70498 CT ANGIOGRAPHY NECK: CPT

## 2022-05-11 PROCEDURE — 99232 SBSQ HOSP IP/OBS MODERATE 35: CPT | Performed by: PSYCHIATRY & NEUROLOGY

## 2022-05-11 PROCEDURE — B24BZZ4 ULTRASONOGRAPHY OF HEART WITH AORTA, TRANSESOPHAGEAL: ICD-10-PCS | Performed by: INTERNAL MEDICINE

## 2022-05-11 PROCEDURE — NC001 PR NO CHARGE: Performed by: INTERNAL MEDICINE

## 2022-05-11 PROCEDURE — 83605 ASSAY OF LACTIC ACID: CPT | Performed by: INTERNAL MEDICINE

## 2022-05-11 PROCEDURE — 85025 COMPLETE CBC W/AUTO DIFF WBC: CPT | Performed by: INTERNAL MEDICINE

## 2022-05-11 PROCEDURE — 97110 THERAPEUTIC EXERCISES: CPT

## 2022-05-11 PROCEDURE — 84484 ASSAY OF TROPONIN QUANT: CPT | Performed by: INTERNAL MEDICINE

## 2022-05-11 PROCEDURE — 93320 DOPPLER ECHO COMPLETE: CPT | Performed by: INTERNAL MEDICINE

## 2022-05-11 PROCEDURE — 97530 THERAPEUTIC ACTIVITIES: CPT

## 2022-05-11 PROCEDURE — 70496 CT ANGIOGRAPHY HEAD: CPT

## 2022-05-11 PROCEDURE — 80048 BASIC METABOLIC PNL TOTAL CA: CPT | Performed by: INTERNAL MEDICINE

## 2022-05-11 PROCEDURE — 97116 GAIT TRAINING THERAPY: CPT

## 2022-05-11 PROCEDURE — 93325 DOPPLER ECHO COLOR FLOW MAPG: CPT | Performed by: INTERNAL MEDICINE

## 2022-05-11 PROCEDURE — 99232 SBSQ HOSP IP/OBS MODERATE 35: CPT | Performed by: INTERNAL MEDICINE

## 2022-05-11 PROCEDURE — 97535 SELF CARE MNGMENT TRAINING: CPT

## 2022-05-11 PROCEDURE — 76376 3D RENDER W/INTRP POSTPROCES: CPT

## 2022-05-11 PROCEDURE — 82948 REAGENT STRIP/BLOOD GLUCOSE: CPT

## 2022-05-11 RX ORDER — SODIUM CHLORIDE, SODIUM GLUCONATE, SODIUM ACETATE, POTASSIUM CHLORIDE, MAGNESIUM CHLORIDE, SODIUM PHOSPHATE, DIBASIC, AND POTASSIUM PHOSPHATE .53; .5; .37; .037; .03; .012; .00082 G/100ML; G/100ML; G/100ML; G/100ML; G/100ML; G/100ML; G/100ML
INJECTION, SOLUTION INTRAVENOUS
Status: COMPLETED | OUTPATIENT
Start: 2022-05-11 | End: 2022-05-11

## 2022-05-11 RX ORDER — PROPOFOL 10 MG/ML
INJECTION, EMULSION INTRAVENOUS AS NEEDED
Status: DISCONTINUED | OUTPATIENT
Start: 2022-05-11 | End: 2022-05-11

## 2022-05-11 RX ORDER — FENTANYL CITRATE 50 UG/ML
INJECTION, SOLUTION INTRAMUSCULAR; INTRAVENOUS AS NEEDED
Status: DISCONTINUED | OUTPATIENT
Start: 2022-05-11 | End: 2022-05-11

## 2022-05-11 RX ORDER — LISINOPRIL 5 MG/1
5 TABLET ORAL DAILY
Qty: 30 TABLET | Refills: 0 | Status: SHIPPED | OUTPATIENT
Start: 2022-05-12 | End: 2022-07-01 | Stop reason: SDUPTHER

## 2022-05-11 RX ORDER — CLOPIDOGREL BISULFATE 75 MG/1
75 TABLET ORAL DAILY
Qty: 90 TABLET | Refills: 0 | Status: SHIPPED | OUTPATIENT
Start: 2022-05-12 | End: 2022-05-17

## 2022-05-11 RX ORDER — SODIUM CHLORIDE, SODIUM LACTATE, POTASSIUM CHLORIDE, CALCIUM CHLORIDE 600; 310; 30; 20 MG/100ML; MG/100ML; MG/100ML; MG/100ML
INJECTION, SOLUTION INTRAVENOUS CONTINUOUS PRN
Status: DISCONTINUED | OUTPATIENT
Start: 2022-05-11 | End: 2022-05-11

## 2022-05-11 RX ORDER — LISINOPRIL 5 MG/1
5 TABLET ORAL DAILY
Status: DISCONTINUED | OUTPATIENT
Start: 2022-05-11 | End: 2022-05-17 | Stop reason: HOSPADM

## 2022-05-11 RX ORDER — ATORVASTATIN CALCIUM 40 MG/1
40 TABLET, FILM COATED ORAL EVERY EVENING
Qty: 90 TABLET | Refills: 0 | Status: SHIPPED | OUTPATIENT
Start: 2022-05-11 | End: 2022-07-01 | Stop reason: SDUPTHER

## 2022-05-11 RX ORDER — ASPIRIN 81 MG/1
81 TABLET, CHEWABLE ORAL DAILY
Qty: 90 TABLET | Refills: 0 | Status: SHIPPED | OUTPATIENT
Start: 2022-05-12 | End: 2022-05-17

## 2022-05-11 RX ADMIN — CLOPIDOGREL BISULFATE 75 MG: 75 TABLET ORAL at 08:09

## 2022-05-11 RX ADMIN — TOPICAL ANESTHETIC 1 SPRAY: 200 SPRAY DENTAL; PERIODONTAL at 09:16

## 2022-05-11 RX ADMIN — PROPOFOL 10 MG: 10 INJECTION, EMULSION INTRAVENOUS at 09:26

## 2022-05-11 RX ADMIN — AMLODIPINE BESYLATE 5 MG: 5 TABLET ORAL at 08:10

## 2022-05-11 RX ADMIN — SODIUM CHLORIDE, SODIUM LACTATE, POTASSIUM CHLORIDE, AND CALCIUM CHLORIDE: .6; .31; .03; .02 INJECTION, SOLUTION INTRAVENOUS at 09:12

## 2022-05-11 RX ADMIN — IOHEXOL 65 ML: 350 INJECTION, SOLUTION INTRAVENOUS at 17:02

## 2022-05-11 RX ADMIN — PROPOFOL 100 MG: 10 INJECTION, EMULSION INTRAVENOUS at 09:21

## 2022-05-11 RX ADMIN — Medication 3 MG: at 21:26

## 2022-05-11 RX ADMIN — SODIUM CHLORIDE, SODIUM GLUCONATE, SODIUM ACETATE, POTASSIUM CHLORIDE, MAGNESIUM CHLORIDE, SODIUM PHOSPHATE, DIBASIC, AND POTASSIUM PHOSPHATE 500 ML: .53; .5; .37; .037; .03; .012; .00082 INJECTION, SOLUTION INTRAVENOUS at 16:38

## 2022-05-11 RX ADMIN — ATORVASTATIN CALCIUM 40 MG: 40 TABLET, FILM COATED ORAL at 17:56

## 2022-05-11 RX ADMIN — FENTANYL CITRATE 50 MCG: 50 INJECTION, SOLUTION INTRAMUSCULAR; INTRAVENOUS at 09:16

## 2022-05-11 RX ADMIN — LISINOPRIL 5 MG: 5 TABLET ORAL at 08:10

## 2022-05-11 RX ADMIN — FENOFIBRATE 48 MG: 48 TABLET, FILM COATED ORAL at 08:09

## 2022-05-11 RX ADMIN — ASPIRIN 81 MG CHEWABLE TABLET 81 MG: 81 TABLET CHEWABLE at 08:09

## 2022-05-11 RX ADMIN — PROPOFOL 20 MG: 10 INJECTION, EMULSION INTRAVENOUS at 09:29

## 2022-05-11 RX ADMIN — PROPOFOL 20 MG: 10 INJECTION, EMULSION INTRAVENOUS at 09:31

## 2022-05-11 RX ADMIN — PROPOFOL 10 MG: 10 INJECTION, EMULSION INTRAVENOUS at 09:24

## 2022-05-11 RX ADMIN — PROPOFOL 20 MG: 10 INJECTION, EMULSION INTRAVENOUS at 09:28

## 2022-05-11 NOTE — ANESTHESIA POSTPROCEDURE EVALUATION
Post-Op Assessment Note    CV Status:  Stable    Pain management: adequate     Mental Status:  Lethargic, sleepy and confused   Hydration Status:  Stable   PONV Controlled:  None   Airway Patency:  Patent      Post Op Vitals Reviewed: Yes      Staff: CRNA         No complications documented      BP   137/84   Temp   98   Pulse  73   Resp      SpO2   97

## 2022-05-11 NOTE — RAPID RESPONSE
Rapid Response Note  Shena Mackey  74 y o  male MRN: [id number]  Unit/Bed#: S -01 Encounter: [id number]    Rapid Response Notification(s):   Response called date/time:  5/11/2022 4:26 PM  Response team arrival date/time:  5/11/2022 4:28 PM  Response end date/time:  5/11/2022 4:45 PM  Level of care:  [facility]  Rapid response location:  [facility] unit  Primary reason for rapid response call:  Acute change in neuro status    Rapid Response Intervention(s):   Airway:  None  Breathing:  None  Circulation:  None  Fluids administered:  Isolyte/plasmalyte (500cc)  Medications administered:  None       Assessment:   · Probable vasovagal event with recurrence of same stroke symptoms on presentation, possible hypoperfusion to similar area in the right MCA distribution    Plan:   · Stroke alert called  · Follow-up CT head without contrast, CTA head/neck and neurology recommendations  · Cancel discharge, continue aspirin/Plavix, statin, consider holding blood pressure agents until more stable  · PT/OT/SLP  · Symptoms rapidly improving during rapid response, suspect not a candidate for reperfusion therapy at this point, defer to neurology   · Neuro checks   · Telemetry monitoring  · Follow-up labs including CBC, BMP, lactic, troponin     Rapid Response Outcome:   Transfer:  Remain on floor  Code Status: Level 1 (Full Code)      Family notified: yes  Family member contacted: wife at bedside      Background/Situation:   Shena Mackey  is a 74 y o  male 78-year-old male who presented to the hospital for left-sided weakness, ambulatory dysfunction, lightheadedness  He presented on 05/08  Patient was found to have right MCA occlusion, was evaluated by Neurology and had TTE and LUCY  He was started on dual antiplatelet therapy, statin and was medically managed  He was planning for discharge today  After a bowel movement, he became lightheaded, diaphoretic, and was swaying to his side      He was immediately placed back in bed and noted to have recurrence of dysarthria and left-sided facial droop and possible left upper extremity weakness  Given this rapid response was called  Arrived at bedside and noted to have left-sided facial droop and dysarthria  Strength seemed to be intact  He was able to converse and was alert and oriented  SBP 140s, heart rate 60s, saturating well on room air  Saturating well on room air  Blood sugar 120s  Stroke alert was called and Neurology arrived at bedside  Labs drawn and CT head without contrast ordered, as well as CTA head/neck  Review of Systems   Constitutional: Positive for activity change and diaphoresis  Negative for chills, fever and unexpected weight change  HENT: Negative for congestion, rhinorrhea, sneezing and sore throat  Respiratory: Negative for cough, shortness of breath and wheezing  Cardiovascular: Negative for chest pain, palpitations and leg swelling  Gastrointestinal: Negative for abdominal pain, constipation, diarrhea, nausea and vomiting  Genitourinary: Negative for dysuria  Musculoskeletal: Negative for arthralgias  Neurological: Positive for facial asymmetry, speech difficulty and weakness  Negative for dizziness and numbness  Objective:   Vitals:    05/11/22 0946 05/11/22 0959 05/11/22 1641 05/11/22 1645   BP: 137/84 131/79 130/66 114/60   BP Location:       Pulse: 75 65 68    Resp: 18 18 16    Temp: 98 °F (36 7 °C)      TempSrc:       SpO2: 95% 96%     Weight:       Height:         Physical Exam  Constitutional:       General: He is not in acute distress  Appearance: He is well-developed  He is diaphoretic  Comments: Obese, diaphoretic   HENT:      Head: Normocephalic and atraumatic  Mouth/Throat:      Mouth: Mucous membranes are moist       Pharynx: Oropharynx is clear  No oropharyngeal exudate  Eyes:      General: No scleral icterus  Cardiovascular:      Rate and Rhythm: Normal rate and regular rhythm        Heart sounds: Normal heart sounds  No murmur heard  Pulmonary:      Effort: Pulmonary effort is normal  No respiratory distress  Breath sounds: Normal breath sounds  No wheezing  Abdominal:      General: Bowel sounds are normal  There is no distension  Palpations: Abdomen is soft  Tenderness: There is no abdominal tenderness  Musculoskeletal:      Right lower leg: No edema  Left lower leg: No edema  Neurological:      Mental Status: He is alert  Comments: Left-sided facial droop  Alert and oriented  Dysarthria  Strength 5/5 upper and lower extremities, sensation intact         Portions of the record may have been created with voice recognition software  Occasional wrong word or "sound a like" substitutions may have occurred due to the inherent limitations of voice recognition software  Read the chart carefully and recognize, using context, where substitutions have occurred      Jonathon Hilton MD

## 2022-05-11 NOTE — PLAN OF CARE
Problem: OCCUPATIONAL THERAPY ADULT  Goal: Performs self-care activities at highest level of function for planned discharge setting  See evaluation for individualized goals  Description: Treatment Interventions: ADL retraining,Visual perceptual retraining,Functional transfer training,Endurance training,Patient/family training,Equipment evaluation/education,Fine motor coordination activities,Neuromuscular reeducation,Continued evaluation,Energy conservation,Activityengagement  Equipment Recommended: Shower/Tub chair with back ($)       See flowsheet documentation for full assessment, interventions and recommendations  Outcome: Progressing  Note: Limitation: Decreased ADL status, Decreased UE strength, Decreased endurance, Decreased self-care trans, Decreased fine motor control, Visual deficit, Decreased high-level ADLs     Assessment: Pt seen for skilled OT tx session day 1 from 0151-1337 focusing on activity engagement  Pt agreeable to participate in session  Pt completed bed mobility w/ mod I  Pt engaged in functional mobility w/ in room w/ out use of AD  Pt required + time and cues to consistently manage / negotiate obstacles on L  Pt required min A to complete LBD to thread LE into underwear and pants and manage up, over hips on L  Continue to recommend DC home w/ OPOT when medically stable for discharge from acute care w/ family / support assist as needed  Will continue to follow pt in acute care       OT Discharge Recommendation: Home with outpatient rehabilitation (OPOT)

## 2022-05-11 NOTE — QUICK NOTE
Stroke Alert Note   Mandy Wilson  74 y o  male  MRN: [id number]   Unit/Bed#: ED 26 Encounter: [id number]     Patient last known well: 16:20  Stroke alert called: 16:35  Neurology time of arrival: 16:36    75 y/o male with HTN, HLD, pre-diabetes, who initially presented on 5/8/22 as a stroke alert for dizziness, off balance, fall  Initial presenting deficits were left UE weakness  Stroke alert was initiated at that time  NIHSS 1  [facility] without acute findings  CTA was not completed due to contrast shortage  His symptoms resolved since admission  MRI brain revealed right hemispheric infarcts watershed territory  MRA head and neck demonstrated right ICA and right MCA occlusions and left vertebral artery stenosis/occlusion  Suspected etiology to be ESUS  LUCY was completed today and results are pending  If LUCY unrevealing, then plan was to continue on DAPT x 3 months, followed by aspirin monotherapy, in addition to statin therapy  On exam earlier today by Dr Ciera Welch and Dr Leticia Ramey, patient exhibited very minimal drift of left UE but no other focal deficits noted  RR was initiated this afternoon as patient was getting up from using the bathroom and became diaphoretic, dizzy, leaning to the side, had slurred speech/left facial droop/left UE weakness  Patient was placed back into bed  SBP was reported to be in the 140s at that time  BS was 123  Stroke alert was initiated  Upon neurology evaluation, patient reported feeling improved and rapid response team reported patient's symptoms were improving as well  On exam, patient had very subtle decreased left nasolabial fold but smile was symmetric  No drift in bilateral UE/LE, ataxia, dysarthria, aphasia, extinction, sensory deficits, or visual field deficits noted  Patient was AAO x4 and able to follow commands  NIHSS 1 for subtle decreased left nasolabial fold       Plan:  - STAT CT head  - STAT CTA head and neck  - Avoid hypotension  - Continue DAPT with aspirin 81 mg and Plavix 75 mg at this time  - Suspect etiology most likely recrudescence in the setting of vasovagal vs orthostatic hypotension  - Frequent neuro checks; notify with any changes  - Medical management and supportive care per primary team  Correction of any metabolic or infectious disturbances  - Case and plan reviewed with attending neurologist, Dr Mari Kolb  Neurologic exam:    Mental status:  Oriented to person, place, and time  Speech is normal  Level of consciousness: Alert    Cranial Nerves:  -CN II  Visual field full to confrontation  -CN III, IV, VI  PERRL  EOMS intact  -CN V  Facial sensation intact  -CN VII  Subtle decreased left nasolabial fold, symmetric smile  -CN VIII  Hearing intact  -CN IX, X  Palate symmetric  -CN XI  Normal  -CN XII  Tongue midline    Motor exam:  Able to lift bilateral UE up and hold without drift noted x 10 seconds  Able to lift bilateral LE up and hold without drift noted x 5 seconds    Sensation:  Equal sensation to light touch in bilateral UE/LE  No extinction noted    Coordination:  Finger to nose coordination: Normal  Heel to shin coordination: Normal    NIHSS:  1a Level of Consciousness: 0 = Alert   1b  LOC Questions: 0 = Answers both correctly   1c  LOC Commands: 0 = Obeys both correctly   2  Best Gaze: 0 = Normal   3  Visual: 0 = No visual field loss   4  Facial Palsy: 1=Minor paralysis (flattened nasolabial fold, asymmetric on smiling)   5a  Motor Right Arm: 0=No drift, limb holds 90 (or 45) degrees for full 10 seconds   5b  Motor Left Arm: 0=No drift, limb holds 90 (or 45) degrees for full 10 seconds   6a  Motor Right Le=No drift, limb holds 90 (or 45) degrees for full 10 seconds   6b  Motor Left Le=No drift, limb holds 90 (or 45) degrees for full 10 seconds   7  Limb Ataxia:  0=Absent   8  Sensory: 0=Normal; no sensory loss   9  Best Language:  0=No aphasia, normal   10  Dysarthria: 0=Normal articulation   11   Extinction and Inattention (formerly Neglect): 0=No abnormality   Total Score: 1       Time NIHSS was completed: 16:50     Modified [place] Score:  0 (No baseline symptoms/disability)    TPA Decision: Patient not a TPA candidate  Symptoms resolved/clearly non disabling  and Recent significant trauma/stroke        Rajat Snyder  will need follow up in in 4 weeks with neurovascular attending or advance practitioner  He will not require outpatient neurological testing

## 2022-05-11 NOTE — ANESTHESIA PREPROCEDURE EVALUATION
Procedure:  LUCY    Relevant Problems   ANESTHESIA (within normal limits)      CARDIO   (+) Hyperlipidemia   (+) Hypertension, essential      NEURO/PSYCH   (+) Ischemic stroke ([facility] [address] )   5/9/2022  Findings    Left Ventricle Left ventricular cavity size is normal  Wall thickness is normal  The left ventricular ejection fraction is 65%  Systolic function is normal   Wall motion is normal  Diastolic function is mildly abnormal, consistent with grade I (abnormal) relaxation  Right Ventricle Right ventricular cavity size is normal  Systolic function is normal  Wall thickness is normal    Left Atrium The atrium is normal in size  Right Atrium The atrium is normal in size  Aortic Valve The aortic valve is trileaflet  The leaflets are not thickened  The leaflets are not calcified  The leaflets exhibit normal mobility  There is no evidence of regurgitation  There is no evidence of stenosis  Mitral Valve The mitral valve has normal structure and function  There is trace regurgitation  There is no evidence of stenosis  Tricuspid Valve Tricuspid valve structure is normal  There is trace regurgitation  There is no evidence of stenosis  Pulmonic Valve Pulmonic valve structure is normal  There is no evidence of regurgitation  There is no evidence of stenosis  Ascending Aorta The aortic root is normal in size  IVC/SVC The inferior vena cava is normal in size  Respirophasic changes were normal    Pericardium There is no pericardial effusion  The pericardium is normal in appearance  Pulmonary Artery The pulmonary artery systolic pressure is normal           Physical Exam    Airway    Mallampati score: III  TM Distance: >3 FB  Neck ROM: full     Dental   No notable dental hx     Cardiovascular  Rhythm: regular, Rate: normal, Cardiovascular exam normal    Pulmonary  Pulmonary exam normal     Other Findings        Anesthesia Plan  ASA Score- 3     Anesthesia Type- IV sedation with anesthesia with ASA Monitors  Additional Monitors:   Airway Plan:           Plan Factors-Exercise tolerance (METS): <4 METS  Chart reviewed  Existing labs reviewed  Patient summary reviewed  Patient is not a current smoker  Induction-     Postoperative Plan-     Informed Consent- Anesthetic plan and risks discussed with patient  I personally reviewed this patient with the CRNA  Discussed and agreed on the Anesthesia Plan with the CRNA  Lennox Kinney

## 2022-05-11 NOTE — PLAN OF CARE
Problem: Potential for Falls  Goal: Patient will remain free of falls  Description: INTERVENTIONS:  - Educate patient/family on patient safety including physical limitations  - Instruct patient to call for assistance with activity   - Consult OT/PT to assist with strengthening/mobility   - Keep Call bell within reach  - Keep bed low and locked with side rails adjusted as appropriate  - Keep care items and personal belongings within reach  - Initiate and maintain comfort rounds  - Make Fall Risk Sign visible to staff  - Offer Toileting every 2 Hours, in advance of need  - Initiate/Maintain bed alarm  - Obtain necessary fall risk management equipment  - Apply yellow socks and bracelet for high fall risk patients  - Consider moving patient to room near nurses station  5/11/2022 1241 by Tavon Phillips RN  Outcome: Adequate for Discharge  5/11/2022 1029 by Tavon Phillips RN  Outcome: Progressing     Problem: Prexisting or High Potential for Compromised Skin Integrity  Goal: Skin integrity is maintained or improved  Description: INTERVENTIONS:  - Identify patients at risk for skin breakdown  - Assess and monitor skin integrity  - Assess and monitor nutrition and hydration status  - Monitor labs   - Assess for incontinence   - Turn and reposition patient  - Assist with mobility/ambulation  - Relieve pressure over bony prominences  - Avoid friction and shearing  - Provide appropriate hygiene as needed including keeping skin clean and dry  - Evaluate need for skin moisturizer/barrier cream  - Collaborate with interdisciplinary team   - Patient/family teaching  - Consider wound care consult   5/11/2022 1241 by Tavon Phillips RN  Outcome: Adequate for Discharge  5/11/2022 1029 by Tavon Phillips RN  Outcome: Progressing     Problem: MOBILITY - ADULT  Goal: Maintain or return to baseline ADL function  Description: INTERVENTIONS:  -  Assess patient's ability to carry out ADLs; assess patient's baseline for ADL function and identify physical deficits which impact ability to perform ADLs (bathing, care of mouth/teeth, toileting, grooming, dressing, etc )  - Assess/evaluate cause of self-care deficits   - Assess range of motion  - Assess patient's mobility; develop plan if impaired  - Assess patient's need for assistive devices and provide as appropriate  - Encourage maximum independence but intervene and supervise when necessary  - Involve family in performance of ADLs  - Assess for home care needs following discharge   - Consider OT consult to assist with ADL evaluation and planning for discharge  - Provide patient education as appropriate  5/11/2022 1241 by Betty Pierce RN  Outcome: Adequate for Discharge  5/11/2022 1029 by Betty Pierce RN  Outcome: Progressing  Goal: Maintains/Returns to pre admission functional level  Description: INTERVENTIONS:  - Perform BMAT or MOVE assessment daily    - Set and communicate daily mobility goal to care team and patient/family/caregiver  - Collaborate with rehabilitation services on mobility goals if consulted  - Perform Range of Motion   - Dangle patient   - Stand patient  - Ambulate patient   - Out of bed to chair  - Out of bed for meals  - Out of bed for toileting  - Record patient progress and toleration of activity level   5/11/2022 1241 by Betty Pierce RN  Outcome: Adequate for Discharge  5/11/2022 1029 by Betty Pierce RN  Outcome: Progressing     Problem: Neurological Deficit  Goal: Neurological status is stable or improving  Description: Interventions:  - Monitor and assess patient's level of consciousness, motor function, sensory function, and level of assistance needed for ADLs  - Monitor and report changes from baseline  Collaborate with interdisciplinary team to initiate plan and implement interventions as ordered  - Provide and maintain a safe environment  - Consider seizure precautions  - Consider fall precautions    - Consider aspiration precautions  - Consider bleeding precautions  5/11/2022 1241 by Marilyn Carvalho RN  Outcome: Adequate for Discharge  5/11/2022 1029 by Marilyn Carvalho RN  Outcome: Progressing     Problem: Activity Intolerance/Impaired Mobility  Goal: Mobility/activity is maintained at optimum level for patient  Description: Interventions:  - Assess and monitor patient  barriers to mobility and need for assistive/adaptive devices  - Assess patient's emotional response to limitations  - Collaborate with interdisciplinary team and initiate plans and interventions as ordered  - Encourage independent activity per ability   - Maintain proper body alignment  - Perform active/passive rom as tolerated/ordered  - Plan activities to conserve energy   - Turn patient as appropriate  5/11/2022 1241 by Marilyn Carvalho RN  Outcome: Adequate for Discharge  5/11/2022 1029 by Marilyn Carvalho RN  Outcome: Progressing     Problem: Communication Impairment  Goal: Ability to express needs and understand communication  Description: Assess patient's communication skills and ability to understand information  Patient will demonstrate use of effective communication techniques, alternative methods of communication and understanding even if not able to speak  - Encourage communication and provide alternate methods of communication as needed  - Collaborate with case management/ for discharge needs  - Include patient/family/caregiver in decisions related to communication  5/11/2022 1241 by Marilyn Carvalho RN  Outcome: Adequate for Discharge  5/11/2022 1029 by Marilyn Carvalho RN  Outcome: Progressing     Problem: Potential for Aspiration  Goal: Non-ventilated patient's risk of aspiration is minimized  Description: Assess and monitor vital signs, respiratory status, and labs (WBC)  Monitor for signs of aspiration (tachypnea, cough, rales, wheezing, cyanosis, fever)      - Assess and monitor patient's ability to swallow  - Place patient up in chair to eat if possible  - HOB up at 90 degrees to eat if unable to get patient up into chair   - Supervise patient during oral intake  - Instruct patient/ family to take small bites  - Instruct patient/ family to take small single sips when taking liquids  - Follow patient-specific strategies generated by speech pathologist   5/11/2022 1241 by Priti Doss RN  Outcome: Adequate for Discharge  5/11/2022 1029 by Priti Doss RN  Outcome: Progressing     Problem: Nutrition  Goal: Nutrition/Hydration status is improving  Description: Monitor and assess patient's nutrition/hydration status for malnutrition (ex- brittle hair, bruises, dry skin, pale skin and conjunctiva, muscle wasting, smooth red tongue, and disorientation)  Collaborate with interdisciplinary team and initiate plan and interventions as ordered  Monitor patient's weight and dietary intake as ordered or per policy  Utilize nutrition screening tool and intervene per policy  Determine patient's food preferences and provide high-protein, high-caloric foods as appropriate  - Assist patient with eating   - Allow adequate time for meals   - Encourage patient to take dietary supplement as ordered  - Collaborate with clinical nutritionist   - Include patient/family/caregiver in decisions related to nutrition    5/11/2022 1241 by Priti Doss RN  Outcome: Adequate for Discharge  5/11/2022 1029 by Priti Doss RN  Outcome: Progressing

## 2022-05-11 NOTE — CODE DOCUMENTATION
Patient was getting back from the bathroom and became slurred speech, could not stand, and became confused

## 2022-05-11 NOTE — PLAN OF CARE
Problem: Potential for Falls  Goal: Patient will remain free of falls  Description: INTERVENTIONS:  - Educate patient/family on patient safety including physical limitations  - Instruct patient to call for assistance with activity   - Consult OT/PT to assist with strengthening/mobility   - Keep Call bell within reach  - Keep bed low and locked with side rails adjusted as appropriate  - Keep care items and personal belongings within reach  - Initiate and maintain comfort rounds  - Make Fall Risk Sign visible to staff  - Offer Toileting every 2 Hours, in advance of need  - Initiate/Maintain bed/chair alarm  - Obtain necessary fall risk management equipment  - Apply yellow socks and bracelet for high fall risk patients  - Consider moving patient to room near nurses station  Outcome: Progressing     Problem: Prexisting or High Potential for Compromised Skin Integrity  Goal: Skin integrity is maintained or improved  Description: INTERVENTIONS:  - Identify patients at risk for skin breakdown  - Assess and monitor skin integrity  - Assess and monitor nutrition and hydration status  - Monitor labs   - Assess for incontinence   - Turn and reposition patient  - Assist with mobility/ambulation  - Relieve pressure over bony prominences  - Avoid friction and shearing  - Provide appropriate hygiene as needed including keeping skin clean and dry  - Evaluate need for skin moisturizer/barrier cream  - Collaborate with interdisciplinary team   - Patient/family teaching  - Consider wound care consult   Outcome: Progressing     Problem: MOBILITY - ADULT  Goal: Maintain or return to baseline ADL function  Description: INTERVENTIONS:  -  Assess patient's ability to carry out ADLs; assess patient's baseline for ADL function and identify physical deficits which impact ability to perform ADLs (bathing, care of mouth/teeth, toileting, grooming, dressing, etc )  - Assess/evaluate cause of self-care deficits   - Assess range of motion  - Assess patient's mobility; develop plan if impaired  - Assess patient's need for assistive devices and provide as appropriate  - Encourage maximum independence but intervene and supervise when necessary  - Involve family in performance of ADLs  - Assess for home care needs following discharge   - Consider OT consult to assist with ADL evaluation and planning for discharge  - Provide patient education as appropriate  Outcome: Progressing  Goal: Maintains/Returns to pre admission functional level  Description: INTERVENTIONS:  - Perform BMAT or MOVE assessment daily    - Set and communicate daily mobility goal to care team and patient/family/caregiver  - Collaborate with rehabilitation services on mobility goals if consulted  - Dangle patient   - Stand patient   - Ambulate patient   - Out of bed to chair   - Out of bed for meals   - Out of bed for toileting  - Record patient progress and toleration of activity level   Outcome: Progressing     Problem: Neurological Deficit  Goal: Neurological status is stable or improving  Description: Interventions:  - Monitor and assess patient's level of consciousness, motor function, sensory function, and level of assistance needed for ADLs  - Monitor and report changes from baseline  Collaborate with interdisciplinary team to initiate plan and implement interventions as ordered  - Provide and maintain a safe environment  - Consider seizure precautions  - Consider fall precautions  - Consider aspiration precautions  - Consider bleeding precautions  Outcome: Progressing     Problem: Activity Intolerance/Impaired Mobility  Goal: Mobility/activity is maintained at optimum level for patient  Description: Interventions:  - Assess and monitor patient  barriers to mobility and need for assistive/adaptive devices  - Assess patient's emotional response to limitations  - Collaborate with interdisciplinary team and initiate plans and interventions as ordered    - Encourage independent activity per ability   - Maintain proper body alignment  - Perform active/passive rom as tolerated/ordered  - Plan activities to conserve energy   - Turn patient as appropriate  Outcome: Progressing     Problem: Communication Impairment  Goal: Ability to express needs and understand communication  Description: Assess patient's communication skills and ability to understand information  Patient will demonstrate use of effective communication techniques, alternative methods of communication and understanding even if not able to speak  - Encourage communication and provide alternate methods of communication as needed  - Collaborate with case management/ for discharge needs  - Include patient/family/caregiver in decisions related to communication  Outcome: Progressing     Problem: Potential for Aspiration  Goal: Non-ventilated patient's risk of aspiration is minimized  Description: Assess and monitor vital signs, respiratory status, and labs (WBC)  Monitor for signs of aspiration (tachypnea, cough, rales, wheezing, cyanosis, fever)  - Assess and monitor patient's ability to swallow  - Place patient up in chair to eat if possible  - HOB up at 90 degrees to eat if unable to get patient up into chair   - Supervise patient during oral intake  - Instruct patient/ family to take small bites  - Instruct patient/ family to take small single sips when taking liquids  - Follow patient-specific strategies generated by speech pathologist   Outcome: Progressing     Problem: Nutrition  Goal: Nutrition/Hydration status is improving  Description: Monitor and assess patient's nutrition/hydration status for malnutrition (ex- brittle hair, bruises, dry skin, pale skin and conjunctiva, muscle wasting, smooth red tongue, and disorientation)  Collaborate with interdisciplinary team and initiate plan and interventions as ordered    Monitor patient's weight and dietary intake as ordered or per policy  Utilize nutrition screening tool and intervene per policy  Determine patient's food preferences and provide high-protein, high-caloric foods as appropriate  - Assist patient with eating   - Allow adequate time for meals   - Encourage patient to take dietary supplement as ordered  - Collaborate with clinical nutritionist   - Include patient/family/caregiver in decisions related to nutrition    Outcome: Progressing

## 2022-05-11 NOTE — PLAN OF CARE
Problem: Potential for Falls  Goal: Patient will remain free of falls  Description: INTERVENTIONS:  - Educate patient/family on patient safety including physical limitations  - Instruct patient to call for assistance with activity   - Consult OT/PT to assist with strengthening/mobility   - Keep Call bell within reach  - Keep bed low and locked with side rails adjusted as appropriate  - Keep care items and personal belongings within reach  - Initiate and maintain comfort rounds  - Make Fall Risk Sign visible to staff  - Offer Toileting every  Hours, in advance of need  - Initiate/Maintain   alarm  - Obtain necessary fall risk management equipment:     - Apply yellow socks and bracelet for high fall risk patients  - Consider moving patient to room near nurses station  Outcome: Progressing

## 2022-05-11 NOTE — PHYSICAL THERAPY NOTE
PHYSICAL THERAPY NOTE          Patient Name: Jeannie Grullon  Today's Date: 5/11/2022 05/11/22 1125   PT Last Visit   PT Visit Date 05/11/22   Note Type   Note Type Treatment   Pain Assessment   Pain Assessment Tool 0-10   Pain Score No Pain   Restrictions/Precautions   Weight Bearing Precautions Per Order No   Other Precautions Chair Alarm;Visual impairment;Telemetry; Fall Risk   General   Chart Reviewed Yes   Response to Previous Treatment Patient with no complaints from previous session  Family/Caregiver Present Yes  (wife)   Cognition   Overall Cognitive Status Impaired   Arousal/Participation Alert; Responsive; Cooperative   Attention Attends with cues to redirect   Orientation Level Oriented X4   Memory Decreased recall of recent events   Following Commands Follows multistep commands with increased time or repetition   Comments pt was pleasant and cooperative throughout tx session   Subjective   Subjective pt was agreeable to participate in PT intervention   Bed Mobility   Supine to Sit 6  Modified independent   Additional items Assist x 1;HOB elevated; Bedrails; Increased time required   Sit to Supine 6  Modified independent   Additional items Assist x 1;HOB elevated; Bedrails; Increased time required   Additional Comments pt was able to sit EOB w/o LOB while performing TE activities   Transfers   Sit to Stand 5  Supervision   Additional items Assist x 1; Increased time required;Verbal cues   Stand to Sit 5  Supervision   Additional items Assist x 1; Increased time required;Verbal cues   Toilet transfer Unable to assess   Additional Comments pt was able to perform multiple STS from EOB w/ SPC and no AD w/o LOB   Ambulation/Elevation   Gait pattern Decreased foot clearance; Short stride; Excessively slow   Gait Assistance 5  Supervision   Additional items Assist x 1;Verbal cues   Assistive Device SPC; None   Distance 200'x1 SPC and 200'x1 ND   Stair Management Assistance 5  Supervision   Additional items Assist x 1;Verbal cues; Increased time required   Stair Management Technique One rail L;Step to pattern; Foreward;Backward   Number of Stairs 15   Balance   Static Sitting Fair +   Ambulatory Fair -   Activity Tolerance   Activity Tolerance Patient tolerated treatment well   Nurse Made Aware Spoke to RN   Exercises   Knee AROM Long Arc Quad Sitting;20 reps;AROM; Bilateral   Ankle Pumps Sitting;20 reps;AROM; Bilateral   Marching Sitting;20 reps;AROM; Bilateral   Assessment   Prognosis Good   Problem List Decreased strength;Decreased endurance; Impaired balance;Decreased mobility; Decreased safety awareness; Obesity; Impaired vision   Assessment pt began tx session lying supine in the bed and was agreeable to particicpate in pT intervention  pt continues to remain consistant with mod I for all bed mobility as pt did not require VC's and used bed rails to pull up and complete transfer to seated EOB  pt was able to perform TE at EOB w/o LOB  pt continues to perform all functional transfers from EOB with /s and VC's for hand placement while ascending to stand position w/ SPC or no device  pt was able to perfoprm multiple STS in [facility] tx session in order to strengthen LE's andd increase endurance and safety w/ all functioanl transfers  pt was able to increase ambulation distance and activity tolerance in [facility] tx session as pt ambulated 200'x1 SPC and 200'x1 ND w/o LOB as pt was able to avoid obstacles in the hallway, hold a conversation and scan environment in [facility] ambulation trial  pt was educated on stair trials in [facility] tx session as was able to complete 15 steps with /s and L sided hand rail w/o LOB   post tx session pt seated EOB w/ wife present and all pt needs met   Goals   Patient Goals pt continues to stated he is looking forward to going home   STG Expiration Date 05/19/22   PT Treatment Day 2   Plan   Treatment/Interventions Functional transfer training;LE strengthening/ROM; Therapeutic exercise; Endurance training;Patient/family training;Elevations; Equipment eval/education; Bed mobility;Gait training;Spoke to nursing   Progress Progressing toward goals   PT Frequency Other (Comment)  (5x week)   Recommendation   PT Discharge Recommendation Home with outpatient rehabilitation   Additional Comments recommend SPC with all OOB mobility   AM-PAC Basic Mobility Inpatient   Turning in Bed Without Bedrails 4   Lying on Back to Sitting on Edge of Flat Bed 4   Moving Bed to Chair 3   Standing Up From Chair 3   Walk in Room 3   Climb 3-5 Stairs 3   Basic Mobility Inpatient Raw Score 20   Basic Mobility Standardized Score 43 99   Highest Level Of Mobility   JH-HLM Goal 6: Walk 10 steps or more   JH-HLM Highest Level of Mobility 8: Walk 250 feet ot more   JH-HLM Goal Achieved Yes   Education   Education Provided Mobility training;Assistive device; Other  (stair trials)   Patient Demonstrates acceptance/verbal understanding   End of Consult   Patient Position at End of Consult Seated edge of bed;Bed/Chair alarm activated; All needs within reach       The patient's AM-PAC Basic Mobility Inpatient Short Form Raw Score is 20  A Raw score of greater than 16 suggests the patient may benefit from discharge to home  Please also refer to the recommendation of the Physical Therapist for safe discharge planning      Vincent Brown PTA

## 2022-05-11 NOTE — PROGRESS NOTES
[facility]  Progress Note - Jennifer Burgos  1955, 74 y o  male MRN: [id number]  Unit/Bed#: S -01 Encounter: [id number]  Primary Care Provider: Anne Pollock MD   Date and time admitted to hospital: 5/8/2022 10:24 PM    * Ischemic stroke [facility])  Assessment & Plan  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  Recent right hemisphere internal /deep watershed infarcts as well as frontoparietotemporal cortical infarcts   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 with patent, but diminished right middle cerebral artery flow  Evidence of at least distal left vertebral artery occlusion   MRA Carotids :Occluded right cervical and visualized intracranial internal carotid artery   Given the distribution of the patient ischemic stroke, high suspicious for embolic etiology, need to rule out cardiac etiology   Echocardiogram showed LVEF 65% with grade 1 diastolic dysfunction,LUCY was performed today currently pending final report   PT/OT eval with recommendations for home with outpatient physical therapy   Speech evaluation was unremarkable   Hemoglobin A1c 5 9 puts the patient in a pre diabetic range  Encouraged lifestyle modifications     Continue monitor in tele, no reported events    Patient had a sudden left-sided weakness after having a bowel movement, another stroke alert was called on him, stat CT head was ordered, will follow-up on results   Patient back at his baseline after this transient weakness event   Continue with frequent neuro checks    Hypertension, essential  Assessment & Plan  · Blood pressure slightly elevated, patient on amlodipine 5 mg daily at home that was on hold for permissive hypertension  · Restart patient's home medication with amlodipine , blood pressure continued to be persistently above target, will add low-dose lisinopril 5 mg daily to the patient's regimen  ·  Closely monitor blood pressure  · Patient will need tight blood pressure control given his CV event       Hyperlipidemia  Assessment & Plan  · Patient was on fenofibrate at home  · Lipid panel:  Cholesterol 206, triglycerides 163, HDL 32,   · In the setting of acute CVA patient will need to be on high-intensity statin, started on Lipitor 40 mg daily  VTE Pharmacologic Prophylaxis: VTE Score: 3 Moderate Risk (Score 3-4) - Pharmacological DVT Prophylaxis Ordered: enoxaparin (Lovenox)  Patient Centered Rounds: I performed bedside rounds with nursing staff today  Discussions with Specialists or Other Care Team Provider:  Neurology, case management    Education and Discussions with Family / Patient: Updated  (wife) at bedside  Current Length of Stay: 3 day(s)  Current Patient Status: Inpatient   Discharge Plan: Anticipate discharge tomorrow to home  Code Status: Level 1 - Full Code    Subjective:   Patient was sitting and recliner completely awake alert and oriented, denies any weakness, chest pain, palpitations, lightheadedness or dizziness, he reports that he was ambulating with no issues  Stroke alert was called on the patient in the afternoon, he was at the bathroom then he noticed left-sided weakness that shortly resolved afterwards and he was back at his baseline        Objective:     Vitals:   Temp (24hrs), Av 1 °F (36 7 °C), Min:97 9 °F (36 6 °C), Max:98 4 °F (36 9 °C)    Temp:  [97 9 °F (36 6 °C)-98 4 °F (36 9 °C)] 98 °F (36 7 °C)  HR:  [65-75] 68  Resp:  [16-20] 16  BP: (114-151)/(60-91) 114/60  SpO2:  [95 %-96 %] 96 %  Body mass index is 33 91 kg/m²  Input and Output Summary (last 24 hours): Intake/Output Summary (Last 24 hours) at 5/11/2022 1714  Last data filed at 5/11/2022 0934  Gross per 24 hour   Intake 250 ml   Output 0 ml   Net 250 ml       Physical Exam:   Physical Exam  Vitals and nursing note reviewed  Constitutional:       Appearance: He is well-developed  HENT:      Head: Normocephalic and atraumatic  Eyes:      Conjunctiva/sclera: Conjunctivae normal       Pupils: Pupils are equal, round, and reactive to light  Cardiovascular:      Rate and Rhythm: Normal rate and regular rhythm  Heart sounds: Normal heart sounds  No murmur heard  Pulmonary:      Effort: Pulmonary effort is normal  No respiratory distress  Breath sounds: Normal breath sounds  Abdominal:      Palpations: Abdomen is soft  Tenderness: There is no abdominal tenderness  Musculoskeletal:      Cervical back: Normal range of motion and neck supple  Skin:     General: Skin is warm and dry  Capillary Refill: Capillary refill takes less than 2 seconds  Neurological:      Mental Status: He is alert and oriented to person, place, and time        Coordination: Finger-Nose-Finger Test normal    Psychiatric:         Speech: Speech normal         Additional Data:     Labs:  Results from last 7 days   Lab Units 05/11/22  1642   WBC Thousand/uL 13 54*   HEMOGLOBIN g/dL 17 2*   HEMATOCRIT % 50 4*   PLATELETS Thousands/uL 302   NEUTROS PCT % 68   LYMPHS PCT % 18   MONOS PCT % 10   EOS PCT % 2     Results from last 7 days   Lab Units 05/10/22  0342   SODIUM mmol/L 139   POTASSIUM mmol/L 3 8   CHLORIDE mmol/L 107   CO2 mmol/L 23   BUN mg/dL 12   CREATININE mg/dL 0 86   ANION GAP mmol/L 9   CALCIUM mg/dL 8 6   GLUCOSE RANDOM mg/dL 114     Results from last 7 days   Lab Units 05/08/22  2244   INR 0 87     Results from last 7 days   Lab Units 05/11/22  1630 05/08/22  2237   POC GLUCOSE mg/dl 123 191*     Results from last 7 days   Lab Units 05/08/22  2244   HEMOGLOBIN A1C % 5 9*           Lines/Drains:  Invasive Devices  Report    Peripheral Intravenous Line  Duration           Peripheral IV 05/11/22 Left Arm <1 day    Peripheral IV 05/11/22 Right Wrist <1 day                  Telemetry:  Telemetry Orders (From admission, onward)             48 Hour Telemetry Monitoring  Continuous x 48 hours        References:    Telemetry Guidelines   Question:  Reason for 48 Hour Telemetry  Answer:  Acute CVA (<24 hrs old, hemispheric strokes, selected brainstem strokes, cardiac arrhythmias)                 Telemetry Reviewed: Normal Sinus Rhythm  Indication for Continued Telemetry Use: Acute CVA             Imaging: Reviewed radiology reports from this admission including: CT head and MRI brain    Recent Cultures (last 7 days):         Last 24 Hours Medication List:   Current Facility-Administered Medications   Medication Dose Route Frequency Provider Last Rate    amLODIPine  5 mg Oral Daily Maricruz Brock MD      aspirin  81 mg Oral Daily Suurküla, CRNP      atorvastatin  40 mg Oral QPM Suurküla, CRNP      clopidogrel  75 mg Oral Daily Nayla Padilla MD      enoxaparin  40 mg Subcutaneous Daily Suurküla, CRNP      fenofibrate  48 mg Oral Daily Suurküla, CRNP      lisinopril  5 mg Oral Daily Maricruz Brock MD      melatonin  3 mg Oral HS Ismael Sánchez MD          Today, Patient Was Seen By: Maricruz Brock MD    **Please Note: This note may have been constructed using a voice recognition system  **

## 2022-05-11 NOTE — PROGRESS NOTES
Primary RN in bathroom with patient  While in bathroom primary RN noticed patient's left facial droop became suddenly worse followed by left sided flaccidness  Patient was unable to speak or stand  Primary RN and pt's wife assisted him into chair in bathroom  RRT called       Mona Lind RN

## 2022-05-11 NOTE — PLAN OF CARE
Problem: PHYSICAL THERAPY ADULT  Goal: Performs mobility at highest level of function for planned discharge setting  See evaluation for individualized goals  Description: Treatment/Interventions: Functional transfer training,LE strengthening/ROM,Elevations,Therapeutic exercise,Patient/family training,Endurance training,Equipment eval/education,Bed mobility,Gait training          See flowsheet documentation for full assessment, interventions and recommendations  Outcome: Progressing  Note: Prognosis: Good  Problem List: Decreased strength, Decreased endurance, Impaired balance, Decreased mobility, Decreased safety awareness, Obesity, Impaired vision  Assessment: pt began tx session lying supine in the bed and was agreeable to particicpate in pT intervention  pt continues to remain consistant with mod I for all bed mobility as pt did not require VC's and used bed rails to pull up and complete transfer to seated EOB  pt was able to perform TE at EOB w/o LOB  pt continues to perform all functional transfers from EOB with /s and VC's for hand placement while ascending to stand position w/ SPC or no device  pt was able to perfoprm multiple STS in [facility] tx session in order to strengthen LE's andd increase endurance and safety w/ all functioanl transfers  pt was able to increase ambulation distance and activity tolerance in [facility] tx session as pt ambulated 200'x1 SPC and 200'x1 ND w/o LOB as pt was able to avoid obstacles in the hallway, hold a conversation and scan environment in [facility] ambulation trial  pt was educated on stair trials in [facility] tx session as was able to complete 15 steps with /s and L sided hand rail w/o LOB  post tx session pt seated EOB w/ wife present and all pt needs met           PT Discharge Recommendation: Home with outpatient rehabilitation          See flowsheet documentation for full assessment

## 2022-05-11 NOTE — ASSESSMENT & PLAN NOTE
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  Recent right hemisphere internal /deep watershed infarcts as well as frontoparietotemporal cortical infarcts   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 with patent, but diminished right middle cerebral artery flow  Evidence of at least distal left vertebral artery occlusion   MRA Carotids :Occluded right cervical and visualized intracranial internal carotid artery   Given the distribution of the patient ischemic stroke, high suspicious for embolic etiology, need to rule out cardiac etiology   Echocardiogram showed LVEF 65% with grade 1 diastolic dysfunction,LUCY was performed today currently pending final report   PT/OT eval with recommendations for home with outpatient physical therapy   Speech evaluation was unremarkable   Hemoglobin A1c 5 9 puts the patient in a pre diabetic range  Encouraged lifestyle modifications   Continue monitor in tele, no reported events    Patient had a sudden left-sided weakness after having a bowel movement, another stroke alert was called on him, stat CT head was ordered, will follow-up on results   Patient back at his baseline after this transient weakness event     Continue with frequent neuro checks

## 2022-05-11 NOTE — ASSESSMENT & PLAN NOTE
· Blood pressure slightly elevated, patient on amlodipine 5 mg daily at home that was on hold for permissive hypertension    · Restart patient's home medication with amlodipine , blood pressure continued to be persistently above target, will add low-dose lisinopril 5 mg daily to the patient's regimen  ·  Closely monitor blood pressure  · Patient will need tight blood pressure control given his CV event

## 2022-05-11 NOTE — OCCUPATIONAL THERAPY NOTE
Occupational Therapy Progress Note     Patient Name: Randy Smith  Today's Date: 5/11/2022  Problem List  Principal Problem:    Ischemic stroke [facility])  Active Problems:    Hyperlipidemia    Hypertension, essential       05/11/22 1203   OT Last Visit   OT Visit Date 05/11/22  (Wednesday)   Note Type   Note Type Treatment   Restrictions/Precautions   Weight Bearing Precautions Per Order No   Other Precautions Chair Alarm;Telemetry; Visual impairment; Fall Risk   General   Response to Previous Treatment Patient reporting fatigue but able to participate   Family/Caregiver Present Yes, pt's wife present during session   Lifestyle   Intrinsic Gratification Pt enjoys spending time w/ his grand daughter is expecting 2nd   Pain Assessment   Pain Assessment Tool 0-10   Pain Score No Pain   ADL   Where Assessed Edge of bed  (vs bathroom)   Eating Assistance 6  Modified independent   Eating Deficit Setup   Grooming Assistance 5  Supervision/Setup   Grooming Deficit Verbal cueing; Increased time to complete   Grooming Comments standing at sink to wash hand after voiding   UB Bathing Assistance Unable to assess   LB Bathing Assistance Unable to assess   UB Dressing Assistance 6  Modified independent   UB Dressing Deficit Setup; Increased time to complete   UB Dressing Comments seated at EOB   LB Dressing Assistance 4  Minimal Assistance   LB Dressing Deficit Thread RLE into pants; Thread LLE into pants;Pull up over hips;Verbal cueing;Supervision/safety; Increased time to complete;Setup   LB Dressing Comments enaged in LBD seated at EOB w/ min A   Toileting Assistance  5  Supervision/Setup   Toileting Deficit Setup;Supervison/safety; Increased time to complete   Toileting Comments standing at toilet in bathroom to void   Bed Mobility   Supine to Sit 6  Modified independent   Additional items Increased time required   Sit to Supine 6  Modified independent   Additional items Increased time required   Additional Comments Pt supine HOB elevated upon arrival and end of session w/ needs met, call bell in reach and wife present   Transfers   Sit to Stand 5  Supervision   Additional items Assist x 1; Impulsive;Verbal cues   Stand to Sit 5  Supervision   Additional items Assist x 1; Impulsive;Verbal cues   Toilet transfer Unable to assess  (voided standing at toilet in bathroom)   Functional Mobility   Functional Mobility 5  Supervision  (Close S)   Additional Comments engaged in functional mobility w/ S w/ in room, to / from bathroom w/ + time  Cues to negotiate obstacles on L   Additional items   (no AD)   Toilet Transfers   Toilet Transfers Comments voided standing at toilet in bathroom   Coordination   Gross Motor + time, decreased accuracy / pace finger to thumb opposition L hand   Cognition   Overall Cognitive Status Impaired   Arousal/Participation Arousable; Cooperative   Attention Attends with cues to redirect   Orientation Level Oriented X4   Memory Decreased recall of recent events   Following Commands Follows multistep commands with increased time or repetition   Comments Identified pt by full name and birthdate  Restig upoon therapist arrival  Merlene Waterman to participate  Able to follow directions during ADLs w/ cues  Impulsive and limited consistent carryover   Vision   Vision Comments Continues to benefit from cues to consistently attend to L side of body, and objects on L   Activity Tolerance   Activity Tolerance Patient limited by fatigue   Medical Staff Made Aware per RNSaad appropriate to see pt   Assessment   Assessment Pt seen for skilled OT tx session day 1 from [id number] focusing on activity engagement  Pt agreeable to participate in session  Pt completed bed mobility w/ mod I  Pt engaged in functional mobility w/ in room w/ out use of AD  Pt required + time and cues to consistently manage / negotiate obstacles on L  Pt required min A to complete LBD to thread LE into underwear and pants and manage up, over hips on L   Continue to recommend DC home w/ OPOT when medically stable for discharge from acute care w/ family / support assist as needed  Will continue to follow pt in acute care  Plan   Treatment Interventions ADL retraining;Functional transfer training; Endurance training;Patient/family training;Equipment evaluation/education;Continued evaluation; Energy conservation; Activityengagement; Neuromuscular reeducation; Compensatory technique education;UE strengthening/ROM; Visual perceptual retraining   Goal Expiration Date 05/16/22   OT Treatment Day 1  (Wednesday)   OT Frequency 2-3x/wk   Recommendation   OT Discharge Recommendation Home with outpatient rehabilitation  (OPOT)   Equipment Recommended Shower/Tub chair with back ($)   AM-PAC Daily Activity Inpatient   Lower Body Dressing 3   Bathing 3   Toileting 3   Upper Body Dressing 4   Grooming 3   Eating 4   Daily Activity Raw Score 20   Daily Activity Standardized Score (Calc for Raw Score >=11) 42 03   AM-[facility] Applied Cognition Inpatient   Following a Speech/Presentation 3   Understanding Ordinary Conversation 4   Taking Medications 3   Remembering Where Things Are Placed or Put Away 4   Remembering List of 4-5 Errands 3   Taking Care of Complicated Tasks 3   Applied Cognition Raw Score 20   Applied Cognition Standardized Score 41 76   Barthel Index   Feeding 10   Bathing 0   Grooming Score 5   Dressing Score 5   Bladder Score 10   Bowels Score 10   Toilet Use Score 5   Transfers (Bed/Chair) Score 10   Mobility (Level Surface) Score 10   Stairs Score 5   Barthel Index Score 70   Modified Bryan Scale   Modified Bryan Scale 3   The patient's raw score on the AM-PAC Daily Activity inpatient short form is 20, standardized score is 42 03, greater than 39 4  Patients at this level are likely to benefit from discharge to home  Please refer to the recommendation of the Occupational Therapist for safe discharge planning       LUL Morrison/L

## 2022-05-11 NOTE — DISCHARGE SUMMARY
[facility]  Discharge- Norton Najjar  1955, 74 y o  male MRN: [id number]  Unit/Bed#: ICU 04 Encounter: [id number]  Primary Care Provider: Viki Staley MD   Date and time admitted to hospital: 5/8/2022 10:24 PM    * Ischemic stroke [facility])  Assessment & Plan    · Patient initially presented 5/9 with c/o transient dizziness, lightheadedness and 2 episodes of Left-sided ataxia  Reportedly on evaluation in the ER was found to have subtle deficits of left arm pronator drift and subtle confusion  · 5/9 CT head: No acute territorial infarct, hemorrhage, or midline shift  Asymmetric hyperattenuated appearance distal right M1/perisylvian branches of the right MCA suspicious for acute thrombus given patient symptomatology  · 5/9 MRI brain: Evidence of 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  · 5/11 Rapid Response was called for change in mental status with suspected vasovagal event  ? CT head: Persistent long segment occlusion of RIGHT ICA cervical segment (with scattered areas of faint sliver of contrast enhancement) with near-complete occlusion in petrous to proximal cavernous segments, which may be due to thrombosed dissection   Occluded left vertebral artery (origin to distal V1 segment) with distal reconstitution at the level of proximal V2 segment   Question retrograde flow through a patent [place]  · 5/12 around 22-23:00 patient fell with head-strike  CT head: Subarachnoid hemorrhages within the right frontal and parietal lobe   No midline shift or hydrocephalus  · 5/14 MRI Brain: Redemonstrated findings of late acute to early subacute right MCA and ICA watershed zones of cerebral infarction  New focal cortical hemorrhage in the R frontal lobe and minimal petechial hemorrhage in the right insula   Additional small areas of scattered products suspected within the right cerebral sulci may represent a combination of thromboembolism and/or SAH  · Aspirin monotherapy given some hemorrhagic conversion  · Continue Atorvastatin  · goal systolic <119  · PT/OT and PM&R recommend discharge to [facility]      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[facility])  Assessment & Plan  · 5/9 MRI carotids: Occluded right cervical and visualized intracranial internal carotid artery  Poor visualization of the left vertebral artery suspected developmental nondominant rather than occlusion or stenosis  · 5/11 CTA head and neck: Persistent long segment occlusion of RIGHT ICA cervical segment (with scattered areas of faint sliver of contrast enhancement) with near-complete occlusion in petrous to proximal cavernous segments, which may be due to thrombosed dissection   Occluded left vertebral artery (origin to distal V1 segment) with distal reconstitution at the level of proximal V2 segment   Question retrograde flow through a patent [place]  · 5/14 MRI Brain: Redemonstrated findings of late acute to early subacute right MCA and ICA watershed zones of cerebral infarction  New focal cortical hemorrhage in the R frontal lobe and minimal petechial hemorrhage in the right insula  Additional small areas of scattered products suspected within the right cerebral sulci may represent a combination of thromboembolism and/or SAH  · Neurosurgery consulted- no surgical intervention required  · C/w Aspirin monotherapy per neuro,dvt ppx resumed yesterday 5/16      Subarachnoid hemorrhage ([address] )  Assessment & Plan  · After headstrike  CT head was completed and noted Right frontal / parietal SAH  · Was initially being treated for R MCA CVA on ASA, Plavix, and Lovenox for DVT ppx   · Neuro OK with aspirin monotherapy   OK'd resuming DVT ppx yesterday (5/16)      Hypertension, essential  Assessment & Plan  · Blood pressure is currently stable - goal SBP <140  · Continue amlodipine and lisinopril       Hyperlipidemia  Assessment & Plan  · Continue atorvastatin and fenofibrate       Urinary incontinence  Assessment & Plan  · S/P stroke- improving     Medical Problems             Resolved Problems  Date Reviewed: 5/16/2022   None               Discharging Resident: Charity Singh MD  Discharging Attending: Mina Grullon MD  PCP: Vincenzo Bourgeois MD  Admission Date:   Admission Orders (From admission, onward)     Ordered        05/08/22 2349  Inpatient Admission  Once                      Discharge Date: 05/17/22    Consultations During Hospital Stay:  · Neurology, Cardiology, Neurosurgery, PM&R, PT/OT, speech therapy, case management    Procedures Performed:   · Henry    Significant Findings / Test Results:   · Lipid panel- Total cholesterol- 206, TG- 163, HDL- 32, LDL- 141  · Hemoglobin A1C- 5 9  · Right MCA stroke  · Right intracranial carotid and middle cerebral artery occlusion  · CT brain (5/11)-  "Evolving acute infarct involving right frontal lobe, right parietal lobe, deep and periventricular white matter of right frontal and right parietal lobes, and right insula  No new sites of acute infarct or acute intracranial hemorrhage "  · CT head and neck (5/11)- "Persistent long segment occlusion of RIGHT ICA cervical segment (with scattered areas of faint sliver of contrast enhancement) with near-complete occlusion in petrous to proximal cavernous segments, which may be due to thrombosed dissection  Occluded left vertebral artery (origin to distal V1 segment) with distal reconstitution at the level of proximal V2 segment"  · CT head (5/12)- "Subarachnoid hemorrhages within the right frontal and parietal lobe  No midline shift or hydrocephalus"  · MRI cervical spine (5/14)- "Mild congenital and acquired spinal stenosis  Multilevel facet arthropathy, mild endplate osteophytic ridging and discogenic disease resulting in mild spinal canal and foraminal stenosis  No cord signal abnormality "  · MRI thoracic spine (5/14)- "Mild noncompressive thoracic degenerative discogenic disease   No thoracic cord signal abnormality "  · MRI lumbar spine (5/16)- "Lumbar degenerative disc disease including a small left foraminal disc protrusion at the L4-5 level"  · TTE (5/9)- EF= 93%, systolic function normal  G1DD    Incidental Findings:   · None    Test Results Pending at Discharge (will require follow up): · Final blood cultures      Outpatient Tests Requested:  · None    Complications:  None  Reason for Admission:  Stroke-like symptoms    Hospital Course:   Merari Judd  is a 74 y o  male patient who originally presented to the hospital on 5/8/2022 due to left-sided weakness, ambulatory dysfunction, lightheadedness  At the ED he was hypertensive, initial evaluation were concerning for left upper extremity drift, NIH score was 1  CT head showed asymmetric hyper attenuated appearance of the distal right MCA branches of the MCA suspicious for acute thrombus  MRI showed evidence of 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  Patient was evaluated by Neurology, stroke workup with hemoglobin A1c unremarkable, lipid panel showed significant hyper cholesterolemia and elevated LDL, echocardiogram was unremarkable given the distribution of the patient ischemic stroke there was a concern for embolic nature a LUCY was performed with no abnormalities  Patient started on dual antiplatelet therapy, high-intensity statin, he had a very significant family history of vascular disease and he was advised to be very strict about his lifestyle modifications and medical management  Patient will require a loop recorder placement as an outpatient  Neurology follow-up on possible vascular surgery evaluation  Unfortunately on originally planned day of hospital discharge on 5/11/22, patient developed began to feel lightheaded and unsteady on his feet, he then developed left sided facial droop and and dysarthria so rapid response/stroke alert was called   CTA head and neck was obtained and showed a large right cervical ICA dissection  , at that time, patient denied any trauma  The next day on 5/12/2022 patient had a fall with head strike but did not lose consciousness  Subsequently patient developed a subarachnoid hemorrhage (frontal and parietal) and his anticoagulation and antiplatelet agents were held  Per Neurology, ASA 81 mg monotherapy resumed due to some conversion of stroke territory  Unfortunately patient developed urinary incontinence after stroke which per patient is improving  Patient was seen by PM&R and PT/OT who recommended acute rehab  Patient is stable for discharge at this time  He will need follow up in 4 weeks with neurovascular team   Neurology is not currently recommending any outpatient neurological testing  Please see above list of diagnoses and related plan for additional information  Condition at Discharge: stable    Discharge Day Visit / Exam:   Subjective:  Patient is sitting comfortably in chair  He is awake alert and oriented, denies any new neurological symptoms, denies any chest pain, palpitations, shortness breath, nausea vomiting or diarrhea  Vitals: Blood Pressure: 140/79 (05/17/22 1118)  Pulse: 77 (05/17/22 1118)  Temperature: 99 1 °F (37 3 °C) (05/17/22 1118)  Temp Source: Oral (05/17/22 1118)  Respirations: 18 (05/17/22 1118)  Height: 6' (182 9 cm) (05/13/22 0359)  Weight - Scale: 111 kg (243 lb 13 3 oz) (05/17/22 0400)  SpO2: 97 % (05/17/22 1118)  Exam:   Physical Exam  Vitals and nursing note reviewed  Constitutional:       General: He is not in acute distress  Appearance: Normal appearance  He is well-developed  He is not ill-appearing, toxic-appearing or diaphoretic  HENT:      Head: Normocephalic and atraumatic  Eyes:      General: No scleral icterus  Right eye: No discharge  Left eye: No discharge  Extraocular Movements: Extraocular movements intact        Conjunctiva/sclera: Conjunctivae normal    Cardiovascular:      Rate and Rhythm: Normal rate and regular rhythm  Pulses: Normal pulses  Heart sounds: Normal heart sounds  Pulmonary:      Effort: Pulmonary effort is normal  No respiratory distress  Breath sounds: Normal breath sounds  Abdominal:      General: There is no distension  Musculoskeletal:      Cervical back: Normal range of motion and neck supple  Skin:     General: Skin is warm and dry  Neurological:      Mental Status: He is alert and oriented to person, place, and time  Motor: Weakness (4/5 in LUE  ) present  Coordination: Finger-Nose-Finger Test normal       Comments: Mild left facial asymmetry  Psychiatric:         Mood and Affect: Mood normal          Speech: Speech normal          Behavior: Behavior normal         Discussion with Family: Updated  (wife) at bedside  Discharge instructions/Information to patient and family:   See after visit summary for information provided to patient and family  Provisions for Follow-Up Care:  See after visit summary for information related to follow-up care and any pertinent home health orders  Disposition:   Home    Planned Readmission: no    Discharge Medications:  See after visit summary for reconciled discharge medications provided to patient and/or family        **Please Note: This note may have been constructed using a voice recognition system**

## 2022-05-11 NOTE — PROGRESS NOTES
NEUROLOGY RESIDENCY PROGRESS NOTE     Name: Mandy Wilson  Age & Sex: 74 y o  male   MRN: [id number]  Unit/Bed#: S -01   Encounter: [id number]    ASSESSMENT & PLAN     * Ischemic stroke [facility])  Assessment & Plan  Stroke alert on 5/8/2022 10:24 PM with initial NIHSS of 1 and LKW 1900, initial Blood Pressure: 169/80  Initial presenting deficits were left upper extremity weakness  As a result of low score pt was determined to not be a candidate for tPA   Exam on 05/10/22: resolved back to baseline, very minimal drift of the LUE   Current Blood Pressure: 144/83, BP over 24 hours: BP  Min: 141/71  Max: 170/83    Home meds: no AP/AC at home    Workup:  Lab Results   Component Value Date    HGBA1C 5 9 (H) 05/08/2022    CHOLESTEROL 206 (H) 05/08/2022    LDLCALC 141 (H) 05/08/2022    TRIG 163 (H) 05/08/2022    INR 0 87 05/08/2022       CTH: no acute findings   CTA: did not receive due to contrast shortage   MRI: R hemispheric infarcts watershed territory, MRA head and neck showing R ICA and R MCA occlusions and L vertebral artery stenosis/occlusion   Echocardiogram: LVEF 31%, grad 1 diastolic dysfunction, no LA dilation   o LUCY: pending read   Telemetry: no events    Pertinent scores:  - NIHSS: 1  Stroke Modified Perkins Score: 0 (No baseline symptoms/disability)    Impression: LVO stroke 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    Plan:  - Stroke pathway  - Discussed plan with neurology attending, Dr Leticia Ramey  - BP: -160 allow for some amount of permissve HTN due to stenosis of numerous intracranial vessels  - Obtain lipids and A1c  - atorvastatin 40mg qhs  - Maintain glucose <180, SSI for coverage if indicated  - Medical management as per primary team appreciated  - Antiplatelet agents: ASA 81 mg, Plavix 75 mg and continue for 3 months due to intracranial athero  - DVT ppx and SCDs  - Monitor on telemetry  - PT/OT/Speech/PM&R input appreciated   - Pending LUCY result   If normal is appropriate for d/c on ASA/Plavix x 3 months  If not normal may need medication re-evaluation prior to d/c      Allan Devlin  will need follow up in in 4 weeks with neurovascular attending ap resident  He will not require outpatient neurological testing  SUBJECTIVE     Patient was seen and examined  No acute events overnight  Changes to symptoms: none, he feels he is getting better  Medication changes: none  Mood and sleep: no complaints  Waiting on LUCY results  No new labs  Stable VS    10 point review of systems conducted and otherwise negative other than as documented above    OBJECTIVE     Patient ID: Allan Devlin  is a 74 y o  male  Vitals:    22 0756 22 0901 22 0946 22 0959   BP: 138/81 151/91 137/84 131/79   BP Location: Right arm      Pulse: 68 71 75 65   Resp: 20 18 18 18   Temp: 97 9 °F (36 6 °C) 98 °F (36 7 °C) 98 °F (36 7 °C)    TempSrc: Oral Temporal     SpO2: 95% 96% 95% 96%   Weight:  113 kg (250 lb)     Height:  6' (1 829 m)        Temperature:   Temp (24hrs), Av 1 °F (36 7 °C), Min:97 9 °F (36 6 °C), Max:98 4 °F (36 9 °C)    Temperature: 98 °F (36 7 °C)    Neurologic Exam     Mental Status   Oriented to person, place, and time  Attention: normal  Concentration: normal    Speech: speech is normal   Level of consciousness: alert  Knowledge: good  Normal comprehension  Cranial Nerves     CN II   Visual fields full to confrontation  CN III, IV, VI   Pupils are equal, round, and reactive to light  Extraocular motions are normal      CN V   Facial sensation intact  CN VII   Facial expression full, symmetric  CN VIII   CN VIII normal      CN IX, X   CN IX normal    CN X normal      CN XI   CN XI normal      CN XII   CN XII normal      Motor Exam   Muscle bulk: normal  Overall muscle tone: normal  Right arm pronator drift: absent  Left arm pronator drift: present (very mild)    Strength   Strength 5/5 except as noted       Gait, Coordination, and Reflexes     Coordination Finger to nose coordination: normal    Reflexes   Reflexes 2+ except as noted  LABORATORY DATA     Labs: I have personally reviewed pertinent reports  Results from last 7 days   Lab Units 05/10/22  0342 05/09/22  0424 05/08/22  2244   WBC Thousand/uL 9 22 8 99 8 55   HEMOGLOBIN g/dL 15 6 15 3 15 7   HEMATOCRIT % 46 2 46 6 46 4   PLATELETS Thousands/uL 218 245 248   NEUTROS PCT %  --  76*  --    MONOS PCT %  --  7  --       Results from last 7 days   Lab Units 05/10/22  0342 05/09/22  0424 05/08/22  2244   POTASSIUM mmol/L 3 8 3 9 4 3   CHLORIDE mmol/L 107 109* 108   CO2 mmol/L 23 24 23   BUN mg/dL 12 12 14   CREATININE mg/dL 0 86 0 87 0 93   CALCIUM mg/dL 8 6 9 3 9 3              Results from last 7 days   Lab Units 05/08/22  2244   INR  0 87   PTT seconds 32               IMAGING & DIAGNOSTIC TESTING     Radiology Results: I have personally reviewed pertinent films in PACS      Other Diagnostic Testing: I have personally reviewed pertinent reports  ACTIVE MEDICATIONS     Current Facility-Administered Medications   Medication Dose Route Frequency    amLODIPine (NORVASC) tablet 5 mg  5 mg Oral Daily    aspirin chewable tablet 81 mg  81 mg Oral Daily    atorvastatin (LIPITOR) tablet 40 mg  40 mg Oral QPM    clopidogrel (PLAVIX) tablet 75 mg  75 mg Oral Daily    enoxaparin (LOVENOX) subcutaneous injection 40 mg  40 mg Subcutaneous Daily    fenofibrate (TRICOR) tablet 48 mg  48 mg Oral Daily    lisinopril (ZESTRIL) tablet 5 mg  5 mg Oral Daily    melatonin tablet 3 mg  3 mg Oral HS       Prior to Admission medications    Medication Sig Start Date End Date Taking?  Authorizing Provider   amLODIPine (NORVASC) 5 mg tablet Take 1 tablet (5 mg total) by mouth daily 9/22/21  Yes José Yanez MD   fenofibrate (TRICOR) 48 mg tablet Take 1 tablet (48 mg total) by mouth daily 3/2/22 2/25/23 Yes José Yanez MD       ==  MD Caitlin Crawley [facility] Neurology Residency, PGY-3

## 2022-05-12 ENCOUNTER — APPOINTMENT (INPATIENT)
Dept: CT IMAGING | Facility: HOSPITAL | Age: 67
DRG: 064 | End: 2022-05-12
Payer: MEDICARE

## 2022-05-12 PROBLEM — I77.71 DISSECTION OF CAROTID ARTERY (HCC): Status: ACTIVE | Noted: 2022-05-12

## 2022-05-12 PROBLEM — R32 URINARY INCONTINENCE: Status: ACTIVE | Noted: 2022-05-12

## 2022-05-12 LAB
BASOPHILS # BLD AUTO: 0.05 THOUSANDS/ΜL (ref 0–0.1)
BASOPHILS NFR BLD AUTO: 1 % (ref 0–1)
EOSINOPHIL # BLD AUTO: 0.3 THOUSAND/ΜL (ref 0–0.61)
EOSINOPHIL NFR BLD AUTO: 3 % (ref 0–6)
ERYTHROCYTE [DISTWIDTH] IN BLOOD BY AUTOMATED COUNT: 12.9 % (ref 11.6–15.1)
GLUCOSE SERPL-MCNC: 112 MG/DL (ref 65–140)
GLUCOSE SERPL-MCNC: 136 MG/DL (ref 65–140)
HCT VFR BLD AUTO: 46 % (ref 36.5–49.3)
HGB BLD-MCNC: 15.7 G/DL (ref 12–17)
IMM GRANULOCYTES # BLD AUTO: 0.04 THOUSAND/UL (ref 0–0.2)
IMM GRANULOCYTES NFR BLD AUTO: 0 % (ref 0–2)
LYMPHOCYTES # BLD AUTO: 1.33 THOUSANDS/ΜL (ref 0.6–4.47)
LYMPHOCYTES NFR BLD AUTO: 14 % (ref 14–44)
MCH RBC QN AUTO: 30.5 PG (ref 26.8–34.3)
MCHC RBC AUTO-ENTMCNC: 34.1 G/DL (ref 31.4–37.4)
MCV RBC AUTO: 90 FL (ref 82–98)
MONOCYTES # BLD AUTO: 0.86 THOUSAND/ΜL (ref 0.17–1.22)
MONOCYTES NFR BLD AUTO: 9 % (ref 4–12)
NEUTROPHILS # BLD AUTO: 6.82 THOUSANDS/ΜL (ref 1.85–7.62)
NEUTS SEG NFR BLD AUTO: 73 % (ref 43–75)
NRBC BLD AUTO-RTO: 0 /100 WBCS
PLATELET # BLD AUTO: 228 THOUSANDS/UL (ref 149–390)
PMV BLD AUTO: 9.8 FL (ref 8.9–12.7)
RBC # BLD AUTO: 5.14 MILLION/UL (ref 3.88–5.62)
WBC # BLD AUTO: 9.4 THOUSAND/UL (ref 4.31–10.16)

## 2022-05-12 PROCEDURE — 99222 1ST HOSP IP/OBS MODERATE 55: CPT | Performed by: PHYSICAL MEDICINE & REHABILITATION

## 2022-05-12 PROCEDURE — 97164 PT RE-EVAL EST PLAN CARE: CPT

## 2022-05-12 PROCEDURE — 70450 CT HEAD/BRAIN W/O DYE: CPT

## 2022-05-12 PROCEDURE — 85025 COMPLETE CBC W/AUTO DIFF WBC: CPT | Performed by: INTERNAL MEDICINE

## 2022-05-12 PROCEDURE — 82948 REAGENT STRIP/BLOOD GLUCOSE: CPT

## 2022-05-12 PROCEDURE — G1004 CDSM NDSC: HCPCS

## 2022-05-12 PROCEDURE — 99232 SBSQ HOSP IP/OBS MODERATE 35: CPT | Performed by: INTERNAL MEDICINE

## 2022-05-12 PROCEDURE — 99233 SBSQ HOSP IP/OBS HIGH 50: CPT | Performed by: PSYCHIATRY & NEUROLOGY

## 2022-05-12 PROCEDURE — 97116 GAIT TRAINING THERAPY: CPT

## 2022-05-12 RX ADMIN — CLOPIDOGREL BISULFATE 75 MG: 75 TABLET ORAL at 08:03

## 2022-05-12 RX ADMIN — LISINOPRIL 5 MG: 5 TABLET ORAL at 08:02

## 2022-05-12 RX ADMIN — ASPIRIN 81 MG CHEWABLE TABLET 81 MG: 81 TABLET CHEWABLE at 08:02

## 2022-05-12 RX ADMIN — ENOXAPARIN SODIUM 40 MG: 40 INJECTION SUBCUTANEOUS at 08:02

## 2022-05-12 RX ADMIN — FENOFIBRATE 48 MG: 48 TABLET, FILM COATED ORAL at 08:02

## 2022-05-12 RX ADMIN — ATORVASTATIN CALCIUM 40 MG: 40 TABLET, FILM COATED ORAL at 17:59

## 2022-05-12 RX ADMIN — AMLODIPINE BESYLATE 5 MG: 5 TABLET ORAL at 08:03

## 2022-05-12 RX ADMIN — Medication 3 MG: at 21:13

## 2022-05-12 NOTE — PHYSICAL THERAPY NOTE
PHYSICAL THERAPY REEVAL NOTE    Patient Name: Wen Sensor  Today's Date: 5/12/2022 05/12/22 1500   PT Last Visit   PT Visit Date 05/12/22   Pain Assessment   Pain Assessment Tool 0-10   Pain Score No Pain   Restrictions/Precautions   Other Precautions Chair Alarm; Bed Alarm; Impulsive;Telemetry; Fall Risk;Visual impairment   General   Chart Reviewed Yes   Additional Pertinent History Pt was seen previously for PT intervention  Reeval completed due to acute change in status and Rapid Response 5/11/22  Dx: ischemic CVA, essential HTN, and hyperlipidemia  Personal factors and comorbidities: see initial eval  R UE: 5/5  L UE: WFL active ROM; 3+ to 4-/5  R LE: 5/5  L LE: 3+ to 4-/5  Light touch impaired left thumb and thenar eminence  Coordination impaired L UE  Clinical presentation: unstable/unpredictable (evident in need for assist w/ all phases of mobility, necessity for frequent instruction and safety cues to mobilize safely, poor awareness of left side affecting mobility safety)  Family/Caregiver Present Yes  (spouse present)   Cognition   Arousal/Participation Cooperative   Attention Attends with cues to redirect   Orientation Level Oriented to person;Oriented to place; Other (Comment)  (pt was identified w/ full name and birth date)   Following Commands Follows one step commands with increased time or repetition   Comments pt was noted to have left lean in supine position  Subjective   Subjective pt seen supine in bed w/ spouse present during session  denied pain or dizziness  pt needed input for task focus  Bed Mobility   Supine to Sit 2  Maximal assistance   Additional items Assist x 1;HOB elevated; Bedrails; Increased time required; Impulsive;Verbal cues;LE management  (for bedrail use, left sided awareness, safety)   Sit to Supine 4  Minimal assistance   Additional items Assist x 1;HOB elevated; Bedrails; Increased time required; Impulsive;Verbal cues;LE management  (for trunk/LE positioning)   Additional Comments left trunk lean was noted in seated and standing positions  Transfers   Sit to Stand 3  Moderate assistance   Additional items Assist x 1; Increased time required; Impulsive;Verbal cues  (for trunk/LE positioning)   Stand to Sit 3  Moderate assistance   Additional items Assist x 1; Impulsive;Verbal cues  (for body positioning, safety)   Additional Comments pt bumped walker into obstacles on left side 5x during session, despite input to scan environment  Ambulation/Elevation   Gait pattern Decreased L stance; Wide NIOXN; Short stride; Foward flexed   Gait Assistance 3  Moderate assist   Additional items Assist x 1;Verbal cues  (for walker positioning, left sided awareness, safety)   Assistive Device Rolling walker   Distance 5 feet  seated rest break  40 feet  standing rest break  50 feet  seated rest break  30 feet  (additional not possible due to fatigue)   Stair Management Assistance Not tested  (due to limited ambulation tolerance, safety concerns)   Ambulation/Elevation Additional Comments Comfortable Gait Speed: 0 28 m/s   Balance   Static Sitting Fair -  (left trunk lean)   Static Standing Poor +  (w/ roller walker, left trunk lean)   Ambulatory Poor  (w/ roller walker, left trunk lean)   Activity Tolerance   Activity Tolerance Patient limited by fatigue   Nurse Made Aware spoke to John RAMOS, Dr Manuelito Martinez, Dr Jorge Cantu, [facility] CM   Assessment   Prognosis Good   Problem List Decreased strength;Decreased endurance; Impaired balance;Decreased safety awareness; Obesity; Impaired vision; Impaired sensation;Decreased mobility; Decreased coordination   Assessment Pt is noted to have significant decline in mobility status w/ decreased activity tolerance and increased level of assistance needed to maintain mobility safety  Pt noted to have decreased in gait speed as well   Mobility impairments are related to weakness, impaired balance, decreased endurance, altered sensation, impaired coordination, and gait deviations  Fall risk is also evident in Gait Speed (less than 1 0 indicates need for intervention to address falls risk)  Continued inpatient PT is needed to reduce fall risk factors and progress mobility training as appropriate  Inpatient rehab is recommended following discharge to maximize level of independence  Goals   Patient Goals go home   STG Expiration Date 05/22/22   Short Term Goal #1 pt will: Increase L LE strength 1/2 grade to facilitate independent mobility, Perform bed mobility w/ supervision to increase level of independence, Perform all transfers w/ supervision to improve independence, Ambulate 150 ft  with least restrictive assistive device w/ minx1 w/o LOB to improve functional independence, Navigate 3 stair(s) w/ modx1 with unilateral handrail to facilitate return to previous living environment, Increase ambulatory balance 1 grade to decrease risk for falls, Tolerate 3 hr OOB to faciliate upright tolerance, Improve gait speed to 0 38 m/s to reduce fall risk and increase independence and Improve Barthel Index score to 60 or greater to facilitate independence   PT Treatment Day 1   Plan   Treatment/Interventions Functional transfer training;LE strengthening/ROM; Elevations; Therapeutic exercise; Endurance training;Cognitive reorientation;Patient/family training;Equipment eval/education; Bed mobility;Gait training   Progress Slow progress, decreased activity tolerance   PT Frequency Other (Comment)  (5x/week)   Recommendation   PT Discharge Recommendation Post acute rehabilitation services   Additional Comments pt would benefit from OT follow up to address safety deficit and left sided awareness  Additional Comments 2 recommend roller walker use w/ mobilization     AM-PAC Basic Mobility Inpatient   Turning in Bed Without Bedrails 3   Lying on Back to Sitting on Edge of Flat Bed 2   Moving Bed to Chair 2   Standing Up From Chair 2   Walk in Room 2   Climb 3-5 Stairs 1   Basic Mobility Inpatient Raw Score 12   Basic Mobility Standardized Score 32 23   Highest Level Of Mobility   -[facility] Goal 4: Move to chair/commode   -[facility] Highest Level of Mobility 7: Walk 25 feet or more   -[facility] Goal Achieved Yes   End of Consult   Patient Position at End of Consult Supine;Bed/Chair alarm activated; All needs within reach     Comfortable Gait Speed: 0 28 m/s  Gait Speed Interpretation:  Gain of 0 1 m/s is a predictor of well-being in those w/ abnormal walking speed compared to age-patched peers  <0 7m/s is at an increased risk for falls    Household ambulator: <0 4 m/s  Limited community ambulator: 0 4-0 8 m/s  Community ambulator: 0 8-1 2 m/s  Able to safely cross streets: >1 2 m/s    The patient's AM-PAC Basic Mobility Inpatient Short Form Raw Score is 12  A Raw score of less than or equal to 16 suggests the patient may benefit from discharge to post-acute rehabilitation services  Please also refer to the recommendation of the Physical Therapist for safe discharge planning  Skilled inpatient PT recommended while in hospital to progress pt toward treatment goals      Cata Valdovinos, PT

## 2022-05-12 NOTE — PROGRESS NOTES
NEUROLOGY RESIDENCY PROGRESS NOTE     Name: Robbie Landau  Age & Sex: 74 y o  male   MRN: [id number]  Unit/Bed#: S -01   Encounter: [id number]    ASSESSMENT & PLAN     * Ischemic stroke [facility])  Assessment & Plan  Stroke alert on 5/8/2022 10:24 PM with initial NIHSS of 1 and LKW 1900, initial Blood Pressure: 169/80  Initial presenting deficits were left upper extremity weakness  As a result of low score pt was determined to not be a candidate for tPA      Exam on 05/12/22: resolved back to baseline, very minimal drift of the LUE   Current Blood Pressure: 127/75, BP over 24 hours: BP  Min: 114/60  Max: 141/72    Home meds: no AP/AC at home    Workup:  Lab Results   Component Value Date    HGBA1C 5 9 (H) 05/08/2022    CHOLESTEROL 206 (H) 05/08/2022    LDLCALC 141 (H) 05/08/2022    TRIG 163 (H) 05/08/2022    INR 0 87 05/08/2022       CTH: no acute findings   CTA: did not receive due to contrast shortage during initial stroke alert   CTA 5/11 during inpatient stroke alert: LORI occlusion with evidence of dissection and L vertebral occlusion   MRI: R hemispheric infarcts watershed territory, MRA head and neck showing R ICA and R MCA occlusions and L vertebral artery stenosis/occlusion   Echocardiogram: LVEF 42%, grad 1 diastolic dysfunction, no LA dilation   o LUCY: LVEF 65% no PFO no atrial thrombus   Telemetry: no events    Pertinent scores:  - NIHSS: 1  Stroke Modified [place] Score: 0 (No baseline symptoms/disability)    Impression: LVO stroke 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    Plan:  - Stroke pathway  - Discussed plan with neurology attending, Dr Bharathi Graf  - BP: <140 due to dissection of ICA  - atorvastatin 40mg qhs  - Maintain glucose <180, SSI for coverage if indicated  - Medical management as per primary team appreciated  - Antiplatelet agents: ASA 81 mg, Plavix 75 mg and continue for 3 months due to intracranial athero  - DVT ppx and SCDs  - Monitor on telemetry  - PT/OT/Speech/PM&R input appreciated   - Pending neurosurgery evaluation and comment on CTA findings with dissection    Urinary incontinence  Assessment & Plan  Urinary incontinence noted since stroke on , pt does not seem to be aware when this is occurring but does not have sensory loss or spinal level  UA pending from IM  Discussed with neurosurgery AP    MRI C/T/L spine      Randy Smith  will need follow up in in 4 weeks with neurovascular attending  He will not require outpatient neurological testing  SUBJECTIVE     Patient was seen and examined  No acute events overnight  Changes to symptoms: RRT last night for repeat stroke like symptoms with left facial droop Which has persisted  Medication changes:  none  PT/OT/rehab recs:  rehab  Mood and sleep:  No issues    10 point review of systems conducted and otherwise negative other than as documented above    OBJECTIVE     Patient ID: Randy Smith  is a 74 y o  male  Vitals:    22 0000 22 0200 22 0722 22 1500   BP: 136/64 131/66 138/76 127/75   BP Location: Right arm Right arm Right arm Right arm   Pulse:  93 69 76   Resp:   18 18   Temp:   97 9 °F (36 6 °C) 98 2 °F (36 8 °C)   TempSrc:   Oral Oral   SpO2:   93% 96%   Weight:       Height:          Temperature:   Temp (24hrs), Av 6 °F (37 °C), Min:97 9 °F (36 6 °C), Max:99 2 °F (37 3 °C)    Temperature: 98 2 °F (36 8 °C)    Neurologic Exam     Mental Status   Oriented to person, place, and time  Attention: normal  Concentration: normal    Speech: speech is normal   Level of consciousness: alert  Knowledge: good  Normal comprehension  Cranial Nerves     CN II   Visual fields full to confrontation  CN III, IV, VI   Pupils are equal, round, and reactive to light  Extraocular motions are normal      CN V   Facial sensation intact       CN VII   Left facial weakness: central    CN VIII   CN VIII normal      CN IX, X   CN IX normal    CN X normal      CN XI   CN XI normal      CN XII CN XII normal      Motor Exam   Muscle bulk: normal  Overall muscle tone: normal  Right arm pronator drift: absent  Left arm pronator drift: present (very mild)    Strength   Strength 5/5 except as noted  Gait, Coordination, and Reflexes     Coordination   Finger to nose coordination: normal    Reflexes   Reflexes 2+ except as noted  LABORATORY DATA     Labs: I have personally reviewed pertinent films in PACS  Results from last 7 days   Lab Units 05/12/22  0738 05/11/22  1642 05/10/22  0342 05/09/22  0424   WBC Thousand/uL 9 40 13 54* 9 22 8 99   HEMOGLOBIN g/dL 15 7 17 2* 15 6 15 3   HEMATOCRIT % 46 0 50 4* 46 2 46 6   PLATELETS Thousands/uL 228 302 218 245   NEUTROS PCT % 73 68  --  76*   MONOS PCT % 9 10  --  7      Results from last 7 days   Lab Units 05/11/22  1645 05/10/22  0342 05/09/22  0424   POTASSIUM mmol/L 4 0 3 8 3 9   CHLORIDE mmol/L 103 107 109*   CO2 mmol/L 22 23 24   BUN mg/dL 17 12 12   CREATININE mg/dL 1 03 0 86 0 87   CALCIUM mg/dL 9 6 8 6 9 3              Results from last 7 days   Lab Units 05/08/22  2244   INR  0 87   PTT seconds 32     Results from last 7 days   Lab Units 05/11/22  1642   LACTIC ACID mmol/L 2 0           IMAGING & DIAGNOSTIC TESTING     Radiology Results: I have personally reviewed pertinent films in PACS      Other Diagnostic Testing: I have personally reviewed pertinent reports        ACTIVE MEDICATIONS     Current Facility-Administered Medications   Medication Dose Route Frequency    amLODIPine (NORVASC) tablet 5 mg  5 mg Oral Daily    aspirin chewable tablet 81 mg  81 mg Oral Daily    atorvastatin (LIPITOR) tablet 40 mg  40 mg Oral QPM    clopidogrel (PLAVIX) tablet 75 mg  75 mg Oral Daily    enoxaparin (LOVENOX) subcutaneous injection 40 mg  40 mg Subcutaneous Daily    fenofibrate (TRICOR) tablet 48 mg  48 mg Oral Daily    lisinopril (ZESTRIL) tablet 5 mg  5 mg Oral Daily    melatonin tablet 3 mg  3 mg Oral HS       Prior to Admission medications Medication Sig Start Date End Date Taking? Authorizing Provider   amLODIPine (NORVASC) 5 mg tablet Take 1 tablet (5 mg total) by mouth daily 9/22/21  Yes Julio Betancur MD   aspirin 81 mg chewable tablet Chew 1 tablet (81 mg total)  in the morning  5/12/22 8/10/22 Yes Ansley Bai MD   atorvastatin (LIPITOR) 40 mg tablet Take 1 tablet (40 mg total) by mouth every evening 5/11/22 8/9/22 Yes Ansley Bai MD   clopidogrel (PLAVIX) 75 mg tablet Take 1 tablet (75 mg total) by mouth in the morning  5/12/22 8/10/22 Yes Ansley Bai MD   fenofibrate (TRICOR) 48 mg tablet Take 1 tablet (48 mg total) by mouth daily 3/2/22 2/25/23 Yes Julio Betancur MD   lisinopril (ZESTRIL) 5 mg tablet Take 1 tablet (5 mg total) by mouth in the morning   5/12/22 6/11/22 Yes Ansley Bai MD       ==  Le Silva MD   [facility] Neurology Residency, PGY-3

## 2022-05-12 NOTE — ASSESSMENT & PLAN NOTE
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  Recent right hemisphere internal /deep watershed infarcts as well as frontoparietotemporal cortical infarcts   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 with patent, but diminished right middle cerebral artery flow  Evidence of at least distal left vertebral artery occlusion   MRA Carotids :Occluded right cervical and visualized intracranial internal carotid artery   Given the distribution of the patient ischemic stroke, high suspicious for embolic etiology, need to rule out cardiac etiology   Echocardiogram showed LVEF 65% with grade 1 diastolic dysfunction,LUCY was performed and was unremarkable with no evidence of PFO or thrombus formation    PT/OT eval with recommendations for home with outpatient physical therapy   Speech evaluation was unremarkable   Hemoglobin A1c 5 9 puts the patient in a pre diabetic range  Encouraged lifestyle modifications     Repeated CT head showed evolving ischemic stroke, no evidence of new ischemic or hemorrhagic involvement    Repeated CTA head and neck showed Persistent long segment occlusion of RIGHT ICA cervical segment (with scattered areas of faint sliver of contrast enhancement) with near-complete occlusion in petrous to proximal cavernous segments, which may be due to thrombosed dissection   Per neurology there is no further recommendations will continue with current management with dual antiplatelets, high-intensity statin   Family is very concerning about the patient safety at home despite 2 occasions of PT/OT evaluation with recommendations for outpatient rehab, will place a PMR consult for further evaluation for acute inpatient rehab   Continue with frequent neuro checks on monitor clinically

## 2022-05-12 NOTE — PROGRESS NOTES
[facility]  Progress Note - Randy Smith  1955, 74 y o  male MRN: [id number]  Unit/Bed#: S -01 Encounter: [id number]  Primary Care Provider: Syd Dunne MD   Date and time admitted to hospital: 5/8/2022 10:24 PM    * Ischemic stroke [facility])  Assessment & Plan  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  Recent right hemisphere internal /deep watershed infarcts as well as frontoparietotemporal cortical infarcts   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 with patent, but diminished right middle cerebral artery flow  Evidence of at least distal left vertebral artery occlusion   MRA Carotids :Occluded right cervical and visualized intracranial internal carotid artery   Given the distribution of the patient ischemic stroke, high suspicious for embolic etiology, need to rule out cardiac etiology   Echocardiogram showed LVEF 65% with grade 1 diastolic dysfunction,LUCY was performed and was unremarkable with no evidence of PFO or thrombus formation    PT/OT eval with recommendations for home with outpatient physical therapy   Speech evaluation was unremarkable   Hemoglobin A1c 5 9 puts the patient in a pre diabetic range  Encouraged lifestyle modifications     Repeated CT head showed evolving ischemic stroke, no evidence of new ischemic or hemorrhagic involvement    Repeated CTA head and neck showed Persistent long segment occlusion of RIGHT ICA cervical segment (with scattered areas of faint sliver of contrast enhancement) with near-complete occlusion in petrous to proximal cavernous segments, which may be due to thrombosed dissection   Per neurology there is no further recommendations will continue with current management with dual antiplatelets, high-intensity statin   Family is very concerning about the patient safety at home despite 2 occasions of PT/OT evaluation with recommendations for outpatient rehab, will place a PMR consult for further evaluation for acute inpatient rehab   Continue with frequent neuro checks on monitor clinically  Hypertension, essential  Assessment & Plan  · Blood pressure slightly elevated, patient on amlodipine 5 mg daily at home that was on hold for permissive hypertension  · Restart patient's home medication with amlodipine , blood pressure continued to be persistently above target, will add low-dose lisinopril 5 mg daily to the patient's regimen  ·  Closely monitor blood pressure  · Patient will need tight blood pressure control given his CV event       Hyperlipidemia  Assessment & Plan  · Patient was on fenofibrate at home  · Lipid panel:  Cholesterol 206, triglycerides 163, HDL 32,   · In the setting of acute CVA patient will need to be on high-intensity statin, started on Lipitor 40 mg daily  VTE Pharmacologic Prophylaxis: VTE Score: 3 Moderate Risk (Score 3-4) - Pharmacological DVT Prophylaxis Ordered: enoxaparin (Lovenox)  Patient Centered Rounds: I performed bedside rounds with nursing staff today  Discussions with Specialists or Other Care Team Provider:  Neurology, case management    Education and Discussions with Family / Patient: Updated  (wife) at bedside  Current Length of Stay: 4 day(s)  Current Patient Status: Inpatient   Discharge Plan: Anticipate discharge tomorrow to home      Code Status: Level 1 - Full Code    Subjective:   Patient was lying in bed comfortable completely awake alert and oriented, denies any new neurological deficits, denies any chest pain, palpitations, blurry visions, lightheadedness or dizziness  There is no further episodes of left sided weakness  Patient is tolerating his p o  Intake with no issues  Objective:     Vitals:   Temp (24hrs), Av 7 °F (37 1 °C), Min:97 9 °F (36 6 °C), Max:99 2 °F (37 3 °C)    Temp:  [97 9 °F (36 6 °C)-99 2 °F (37 3 °C)] 97 9 °F (36 6 °C)  HR:  [68-93] 69  Resp:  [16-18] 18  BP: (114-141)/(60-76) 138/76  SpO2:  [93 %] 93 %  Body mass index is 33 91 kg/m²  Input and Output Summary (last 24 hours): Intake/Output Summary (Last 24 hours) at 2022 1152  Last data filed at 2022 1330  Gross per 24 hour   Intake 240 ml   Output --   Net 240 ml       Physical Exam:   Physical Exam  Vitals and nursing note reviewed  Constitutional:       Appearance: He is well-developed  HENT:      Head: Normocephalic and atraumatic  Eyes:      Conjunctiva/sclera: Conjunctivae normal       Pupils: Pupils are equal, round, and reactive to light  Cardiovascular:      Rate and Rhythm: Normal rate and regular rhythm  Heart sounds: Normal heart sounds  No murmur heard  Pulmonary:      Effort: Pulmonary effort is normal  No respiratory distress  Breath sounds: Normal breath sounds  Abdominal:      Palpations: Abdomen is soft  Tenderness: There is no abdominal tenderness  Musculoskeletal:      Cervical back: Normal range of motion and neck supple  Skin:     General: Skin is warm and dry  Capillary Refill: Capillary refill takes less than 2 seconds  Neurological:      Mental Status: He is alert and oriented to person, place, and time        Coordination: Finger-Nose-Finger Test normal    Psychiatric:         Speech: Speech normal         Additional Data:     Labs:  Results from last 7 days   Lab Units 22  0738   WBC Thousand/uL 9 40 HEMOGLOBIN g/dL 15 7   HEMATOCRIT % 46 0   PLATELETS Thousands/uL 228   NEUTROS PCT % 73   LYMPHS PCT % 14   MONOS PCT % 9   EOS PCT % 3     Results from last 7 days   Lab Units 05/11/22  1645   SODIUM mmol/L 136   POTASSIUM mmol/L 4 0   CHLORIDE mmol/L 103   CO2 mmol/L 22   BUN mg/dL 17   CREATININE mg/dL 1 03   ANION GAP mmol/L 11   CALCIUM mg/dL 9 6   GLUCOSE RANDOM mg/dL 131     Results from last 7 days   Lab Units 05/08/22  2244   INR  0 87     Results from last 7 days   Lab Units 05/12/22  0723 05/11/22  1630 05/08/22  2237   POC GLUCOSE mg/dl 112 123 191*     Results from last 7 days   Lab Units 05/08/22  2244   HEMOGLOBIN A1C % 5 9*     Results from last 7 days   Lab Units 05/11/22  1642   LACTIC ACID mmol/L 2 0       Lines/Drains:  Invasive Devices  Report    Peripheral Intravenous Line  Duration           Peripheral IV 05/12/22 Right;Ventral (anterior) Wrist <1 day                  Telemetry:  Telemetry Orders (From admission, onward)             48 Hour Telemetry Monitoring  Continuous x 48 hours        References:    Telemetry Guidelines   Question:  Reason for 48 Hour Telemetry  Answer:  Acute CVA (<24 hrs old, hemispheric strokes, selected brainstem strokes, cardiac arrhythmias)                 Telemetry Reviewed: Normal Sinus Rhythm  Indication for Continued Telemetry Use: Acute CVA             Imaging: Reviewed radiology reports from this admission including: CT head and MRI brain    Recent Cultures (last 7 days):         Last 24 Hours Medication List:   Current Facility-Administered Medications   Medication Dose Route Frequency Provider Last Rate    amLODIPine  5 mg Oral Daily Rosalina Shepherd MD      aspirin  81 mg Oral Daily CARLOS ALBERTO Stern      atorvastatin  40 mg Oral QPM CARLOS ALBERTO Stern      clopidogrel  75 mg Oral Daily Charo Cintron MD      enoxaparin  40 mg Subcutaneous Daily CARLOS ALBERTO Stern      fenofibrate  48 mg Oral Daily CARLOS ALBERTO Stern      lisinopril  5 mg Oral Daily Nestor Stafford MD      melatonin  3 mg Oral HS Chen-Ping Nadean Dandy, MD          Today, Patient Was Seen By: Nestor Stafford MD     **Please Note: This note may have been constructed using a voice recognition system  **

## 2022-05-12 NOTE — PLAN OF CARE
Problem: PHYSICAL THERAPY ADULT  Goal: Performs mobility at highest level of function for planned discharge setting  See evaluation for individualized goals  Description: Treatment/Interventions: Functional transfer training,LE strengthening/ROM,Elevations,Therapeutic exercise,Patient/family training,Endurance training,Equipment eval/education,Bed mobility,Gait training          See flowsheet documentation for full assessment, interventions and recommendations  Outcome: Not Progressing  Note: Prognosis: Good  Problem List: Decreased strength, Decreased endurance, Impaired balance, Decreased safety awareness, Obesity, Impaired vision, Impaired sensation, Decreased mobility, Decreased coordination  Assessment: Pt is noted to have significant decline in mobility status w/ decreased activity tolerance and increased level of assistance needed to maintain mobility safety  Pt noted to have decreased in gait speed as well  Mobility impairments are related to weakness, impaired balance, decreased endurance, altered sensation, impaired coordination, and gait deviations  Fall risk is also evident in Gait Speed (less than 1 0 indicates need for intervention to address falls risk)  Continued inpatient PT is needed to reduce fall risk factors and progress mobility training as appropriate  Inpatient rehab is recommended following discharge to maximize level of independence  PT Discharge Recommendation: Post acute rehabilitation services          See flowsheet documentation for full assessment

## 2022-05-12 NOTE — [facility] ADMISSION
[facility] admissions team received referral on patients case for possible [facility] placement  [facility] admissions team will continue to review and follow patients progress and correspond with PT/OT therapies / [facility] physician and case management until a determination of acceptance to [facility] is made

## 2022-05-12 NOTE — ASSESSMENT & PLAN NOTE
Stroke alert on 5/8/2022 10:24 PM with initial NIHSS of 1 and LKW 1900, initial Blood Pressure: 169/80  Initial presenting deficits were left upper extremity weakness  Inpatient stroke alert on 5/11 due to left facial droop and LUE  As a result of low score pt was determined to not be a candidate for tPA      Exam on 05/13/22: LUE drift, L facial droop   Current Blood Pressure: 144/80, BP over 24 hours: BP  Min: 127/75  Max: 157/70    Home meds: no AP/AC at home    Workup:  Lab Results   Component Value Date    HGBA1C 5 9 (H) 05/08/2022    CHOLESTEROL 206 (H) 05/08/2022    LDLCALC 141 (H) 05/08/2022    TRIG 163 (H) 05/08/2022    INR 0 99 05/13/2022       CTH: no acute findings   CTA: did not receive due to contrast shortage during initial stroke alert   CTA 5/11 during inpatient stroke alert: LORI occlusion with evidence of dissection and L vertebral occlusion   MRI: R hemispheric infarcts watershed territory, MRA head and neck showing R ICA and R MCA occlusions and L vertebral artery stenosis/occlusion   Echocardiogram: LVEF 19%, grad 1 diastolic dysfunction, no LA dilation   o LUCY: LVEF 65% no PFO no atrial thrombus   Telemetry: no events    Pertinent scores:  - NIHSS: 1  Stroke Modified [place] Score: 0 (No baseline symptoms/disability)    Impression: numerous vessel occlusions and 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 discussed with neurosurgery, no intervention and no other recommendations, due to [facility] is only on ASA monotherapy now, was intended for DAPT x 3 months on initial diagnosis    Plan:  - Stroke pathway  - Discussed plan with neurology attending, Dr Bharathi Graf  - BP: <140 due to dissection of ICA  - atorvastatin 40mg qhs  - Maintain glucose <180, SSI for coverage if indicated  - Medical management as per primary team appreciated  - Antiplatelet agents: ASA 81 (was intended for DAPT however pt had traumatic SAH after a fall on 5/12 and is now on monotherapy)  - DVT ppx and SCDs  - Monitor on telemetry  - PT/OT/Speech/PM&R input appreciated

## 2022-05-12 NOTE — ASSESSMENT & PLAN NOTE
Urinary incontinence noted since stroke on Sunday, pt does not seem to be aware when this is occurring but does not have sensory loss or spinal level  UA pending from IM      Discussed with neurosurgery AP    MRI C/T/L spine

## 2022-05-12 NOTE — CONSULTS
PHYSICAL MEDICINE AND REHABILITATION CONSULT NOTE  Wen Sensor  74 y o  male MRN: [id number]  Unit/Bed#: S -01 Encounter: [id number]    Requested by (Physician/Service): Traci Trevizo MD  Reason for Consultation:  Assessment of rehabilitation needs  Chief Complaint:  Impaired cognition, L attention, weakness, facial droop, bowel/bladder, etc      Assessment:  Rehabilitation Diagnosis:    R MCA CVA   Impaired mobility and self care    Impaired cognition    Recommendations:  Rehabilitation Plan:   Please repeat PT/OT evals after event yesterday   Please repeat SLP swallow eval for patient with coughing and some difficulty today   I saw the patient with physical therapy, and based on that eval alone, I suspect he will be a good candidate for [facility]  o He has mild L sided weakness, facial droop, possibly new oral dysphagia  o He has mild L inattention with functional activities impacting his balance   o He has significant motor planning difficulties requiring multiple verbal, visual, and physical cues for sequencing with activities like transfers and bathroom activities  o He has bowel/bladder incontinence (possible UTI vs  Disinhibited due to central process) and will need bowel/bladder training   o He may have some cognitive fatigue as well   o Would benefit from rehab psychology as well for emotional adjustment to impairments  o Will need close monitoring of neuro status  o Will need current medical work-up to be completed  o Clarify plan for R ICA   o He can tolerate 3-5 hours of therapy, 5-7 days a week   o He will need rehab nursing and PT/OT/SLP  o He has potential to make marked  And significant gains  o His wife is open to family training, and plans to bring him home after rehab     Wife's first choice is [facility]      Medical Co-morbidities Pending Issues:  R MCA CVA  - w/ occluded R ICA in both cervical and intracranial portions  - Had repeat episode yesterday of L sided weakness and facial droop  - Wife notes today patient seems a bit off cognitively, weaker, still with L facial droop, some coughing when drinking/eating     - CTH did not show new lesion   - MRI is currently pending, NSx consult pending  - DAPT, Statin  - Risk factor modification  - Outpatient f/u with Neurovascular clinic    HTN  - Lisinopril, Amlodipine    Bowel/bladder incontinence  - Disinhibited  Has been since admission    - UA with reflex to culture pending  - If negative, recommend timed voids Q4-6 hour, would check PVR x1 to ensure no retention  #Vitals: Stable  #Labs: BMP stable, CBC improved WBC to normal, otherwise stable  #Pain: Not an issue currently  #Bowel: Last BM 5/12 and continent   #Bladder: Bladder incontinence noted  #Skin/Pressure Injury Prevention: Turn Q2hr in bed, with weight shifts [id number] in wheelchair  #DVT Prophylaxis: Lovenox, SCDs  #GI Prophylaxis: PO diet    Thank you for allowing the PM&R service to participate in the care of this patient  We will continue to follow Milton Britt Jr 's progress with you  Please do not hesitate to call with questions or concerns    History of Present Illness:  Ash Camarillo  is a 74 y o  male with medical history of HLD, pre-diabetes, who presented to [facility] on 5/8 with lightheadedness and L sided ataxia/weakness  NIHSS 3, CTH with concern for R MCA in area of M1 bifurcation (acute thrombus vs  Calcification vs  Artifact)  CTA H/N not completed due to contrast shortage  IV tPA was not given due to minimal and nondisabling symptoms and low NIHSS, and unclear time of onset  He was loaded with ASA/Plavix, then started on daily DAPT and statin  He was admitted to stroke pathway with MRI brain, MRA H/N, Echo ordered  MRI Brain revealed R MCA infarct, with tiny punctate occipital infarcts  MRA H/N showed occluded R cervical ICA and intracranial ICA  Echo was unremarkable  LUCY was unremarkable without evidence of PFO or thrombus formation  Unfortunately on 5/11, patient developed acute onset worsening L facial droop and L flaccid weakness after having a bowel movement (it was while his wife and staff were helping him stand while they cleaned his bed)  Symptoms resolved upon return to bed  Stroke alert called, Neuro noted NIHSS 1  Repeat CTH showed evolving ischemic CVA without evidence of new ischemic or hemorrhagic involvement  CTA showed persistent long segment occlusion of R ICA cervical segment, with near complete occlusion in petrous to proximal cavernous segments which may be due to thrombosed dissection  Plan remained the same - to discharge with DAPT  After that event, wife noted that patient still seemed off, weaker, more confused  He denies any headache, lightheadedness, double/blurry vision  H noticed he had some trouble drinking today with coughing  Denies any new CP, SOB  She notes he has been incontinent of bowel/bladder since admission, and she isn't sure how to manage that  She wants to be able to help, but doesn't feel comfortable managing him at this level  She does feel that with some training and with some rehab he will improve  At baseline he is very active, strong  Review of Systems: 10 point ROS negative except for what is noted in HPI    CURRENT GAP IN FUNCTION      Prior to Admission:     Functional Status: Patient was independent with mobility/ambulation, transfers, ADL's, IADL's  Retired geometry/, created [employer]  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   Plays the [employer]    + [employer]     FUNCTIONAL STATUS: Therapies were prior to yesterday's events  Please wait for today's therapies    Physical Therapy: mod Ind with bed mobility, Sup transfers, Sup ambulation 200' with SPC     Occupational Therapy: mod Ind eating, Sup grooming, mod Ind UB dressing, Rivera LB dressing, Sup toileting    Speech Therapy: Passed nursing swallow screen, no signs of choking or aspiration   SLP cancelled - Cleared for reg/thins  Social History:    Fanny Martinez  Lives with: lives with their spouse  He lives in [facility] with bed/bath upstairs, full bath on main level  Equipment in home: None  Patient/family's goals: Return to previous home/apartment  Wife available to provide support and light assistance  Social History     Socioeconomic History    Marital status: /Civil Union     Spouse name: None    Number of children: None    Years of education: None    Highest education level: None   Occupational History    None   Tobacco Use    Smoking status: Never Smoker    Smokeless tobacco: Never Used   Vaping Use    Vaping Use: Never used   Substance and Sexual Activity    Alcohol use: Yes     Comment: 1-2 per week; DENIED: HISTORY OF ALCOHOL USE AS PER ALL SCRIPTS    Drug use: No    Sexual activity: None   Other Topics Concern    None   Social History Narrative    Denied: history of daily coffee consumption    Daily cola consumption    Denied: history of daily tea consumption      Social Determinants of Health     Financial Resource Strain: Not on file   Food Insecurity: No Food Insecurity    Worried About Running Out of Food in the Last Year: Never true    Beti of Food in the Last Year: Never true   Transportation Needs: No Transportation Needs    Lack of Transportation (Medical): No    Lack of Transportation (Non-Medical):  No   Physical Activity: Not on file   Stress: Not on file   Social Connections: Not on file   Intimate Partner Violence: Not on file   Housing Stability: Low Risk     Unable to Pay for Housing in the Last Year: No    Number of Places Lived in the Last Year: 1    Unstable Housing in the Last Year: No        Family History:    Family History   Problem Relation Age of Onset    Diabetes Mother     Ulcerative colitis Son     Stroke Father         SYNDROME          MEDICATIONS:     Current Facility-Administered Medications:     amLODIPine (NORVASC) tablet 5 mg, 5 mg, Oral, Daily, Gilmar Osorio MD, 5 mg at 05/12/22 0803    aspirin chewable tablet 81 mg, 81 mg, Oral, Daily, NICK SantiagoNP, 81 mg at 05/12/22 0802    atorvastatin (LIPITOR) tablet 40 mg, 40 mg, Oral, QPM, Linda Nagy CRNP, 40 mg at 05/11/22 1756    clopidogrel (PLAVIX) tablet 75 mg, 75 mg, Oral, Daily, Lindy Guillaume MD, 75 mg at 05/12/22 0803    enoxaparin (LOVENOX) subcutaneous injection 40 mg, 40 mg, Subcutaneous, Daily, Linda Lilo, CRNP, 40 mg at 05/12/22 0802    fenofibrate (TRICOR) tablet 48 mg, 48 mg, Oral, Daily, Linda Lilo, CRNP, 48 mg at 05/12/22 0802    lisinopril (ZESTRIL) tablet 5 mg, 5 mg, Oral, Daily, Gilmar Osorio MD, 5 mg at 05/12/22 0802    melatonin tablet 3 mg, 3 mg, Oral, HS, Ismael Rivas MD, 3 mg at 05/11/22 2126    Past Medical History:     Past Medical History:   Diagnosis Date    Elevated cholesterol with high triglycerides         Past Surgical History:     Past Surgical History:   Procedure Laterality Date    HERNIA REPAIR Left     inguinal    MI COLONOSCOPY FLX DX W/COLLJ SPEC WHEN PFRMD N/A 2/24/2017    Procedure: COLONOSCOPY;  Surgeon: Felicity Munguia MD;  [facility]; Service: Colorectal         Allergies: Allergies   Allergen Reactions    Penicillins Rash           Physical Exam:  /76 (BP Location: Right arm)   Pulse 69   Temp 97 9 °F (36 6 °C) (Oral)   Resp 18   Ht 6' (1 829 m)   Wt 113 kg (250 lb)   SpO2 93%   BMI 33 91 kg/m²      No intake or output data in the 24 hours ending 05/12/22 1510    Body mass index is 33 91 kg/m²  Gen: No acute distress, Well-nourished, well-appearing  HEENT: Moist mucus membranes, Normocephalic/Atraumatic  Cardiovascular: Regular rate, rhythm, S1/S2  Distal pulses palpable  Heme/Extr: No edema  Pulmonary: Non-labored breathing  Lungs CTAB  : No garcia  GI: Soft, non-tender, non-distended  BS+  MSK: ROM is WFL in all extremities  No effusions or deformities   Bulk is symmetric  See below for MMT scores  Gait: Significantly impaired step length and clearance  Floor seeking posture (althuogh this is baseline)  Veers to the L  Runs into things without cuing  Integumentary: Skin is warm, dry  Neuro: AAOx3, CN 2-12 intact - except for L lower facial weakness, contributing to slight mild slur  Visual fields are intact  Sensation intact to light touch throughout  No extinction  Speech is intact  Appropriate to questioning  Tone is normal  No dysmetria/ataxia with FTN  Noted functionally during PT eval to have some L inattention, needs multiple visual/verbal/physical cuing for sequencing during transfers, ambulation, bathroom activities  Impaired divided attention  Impaired motor planning  Impaired processing  Impaired sequencing  MMT:   Strength:   Right  Left  Site  Right  Left  Site    5 4  S Ab: Shoulder Abductors  5  4  HF: Hip Flexors    5 5  EF: Elbow Flexors  5  5 KF: Knee Flexors    5  5  EE: Elbow Extensors  5  4  KE: Knee Extensors    5  5  WE: Wrist Extensors  5  5  DR: Dorsi Flexors    5  4  FF: Finger Flexors  5  5  PF: Plantar Flexors    5  2  HI: Hand Intrinsics  5  5  EHL: Extensor Hallucis Longus   Psych: Normal mood and blunted affect  LABORATORY RESULTS:      Lab Results   Component Value Date    HGB 15 7 05/12/2022    HGB 16 3 10/06/2016    HCT 46 0 05/12/2022    HCT 49 8 10/06/2016    WBC 9 40 05/12/2022    WBC 7 6 10/06/2016     Lab Results   Component Value Date    BUN 17 05/11/2022    BUN 13 10/06/2016     10/06/2016    K 4 0 05/11/2022    K 3 8 10/06/2016     05/11/2022     10/06/2016    CREATININE 1 03 05/11/2022    CREATININE 1 09 10/06/2016     Lab Results   Component Value Date    PROTIME 11 9 05/08/2022    INR 0 87 05/08/2022        DIAGNOSTIC STUDIES: Reviewed  XR chest 1 view portable    Result Date: 5/9/2022  Impression: No acute cardiopulmonary disease   Workstation performed: [id number]     MRA head wo contrast    Result Date: 5/9/2022  Impression: Occluded right intracranial internal carotid artery with patent, but diminished right middle cerebral artery flow  Evidence of at least distal left vertebral artery occlusion  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  Workstation performed: [id number]     MRA carotids wo contrast    Addendum Date: 5/9/2022    ADDENDUM: Subsequent MR angiography of the brain demonstrate evidence of occlusion of the left intracranial vertebral artery  Therefore likely cervical left vertebral stenosis or occlusion rather than developmental nondominant  Result Date: 5/9/2022  Impression: Occluded right cervical and visualized intracranial internal carotid artery  Poor visualization of the left vertebral artery suspected developmental nondominant rather than occlusion or stenosis  Workstation performed: [id number]     MRI brain wo contrast    Result Date: 5/9/2022  Impression: Evidence of 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  Recent right hemisphere internal /deep watershed infarcts as well as frontoparietotemporal cortical infarcts  Few tiny occipital infarcts may be embolic  Other nonemergent findings above  Workstation performed: [id number]     CT stroke alert brain    Result Date: 5/11/2022  Impression: Evolving acute infarct involving right frontal lobe, right parietal lobe, deep and periventricular white matter of right frontal and right parietal lobes, and right insula  No new sites of acute infarct or acute intracranial hemorrhage  Findings were directly discussed with Lauren Conti  at 5:30 PM  Workstation performed: [id number]     CT stroke alert brain    Result Date: 5/8/2022  Impression: No acute territorial infarct, hemorrhage, or midline shift  Asymmetric hyperattenuated appearance distal right M1/perisylvian branches of the right MCA suspicious for acute thrombus given patient symptomatology   Findings were directly discussed with   Fam Armas at 11:15 PM  Workstation performed: [id number]     CTA stroke alert (head/neck)    Result Date: 5/11/2022  Impression: Persistent long segment occlusion of RIGHT ICA cervical segment (with scattered areas of faint sliver of contrast enhancement) with near-complete occlusion in petrous to proximal cavernous segments, which may be due to thrombosed dissection  Occluded left vertebral artery (origin to distal V1 segment) with distal reconstitution at the level of proximal V2 segment  Question retrograde flow through a patent [place]  Additional chronic/incidental findings as detailed above   Findings were directly discussed with Dixie Harrison  at 5:30 PM  Workstation performed: [id number]

## 2022-05-12 NOTE — CASE MANAGEMENT
Case Management Discharge Planning Note    Patient name Parmjit Damon  Location S /S -01 MRN [id number]  : 1955 Date 2022       Current Admission Date: 2022  Current Admission Diagnosis:Ischemic stroke [facility])   Patient Active Problem List    Diagnosis Date Noted    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([address] ) 2022    Urinary incontinence 2022    Ischemic stroke ([email or web address] ) 2022    Hypertension, essential 2015    Hyperlipidemia 2012      LOS (days): 4  Geometric Mean LOS (GMLOS) (days): 2 20  Days to GMLOS:-1 4     OBJECTIVE:  Risk of Unplanned Readmission Score: 8 17         Current admission status: Inpatient   Preferred Pharmacy:   [facility] [address] [place] [address]  [address] [place] [address]  Phone: [phone] Fax: [phone]    Primary Care Provider: Wendy Hartley MD    Primary Insurance: MEDICARE  Secondary Insurance: [facility] HEALTH OPTIONS PROGRAM    DISCHARGE DETAILS:    Discharge planning discussed with[de-identified] Patient & Asuncion-spouse     Comments - Freedom of Choice: Pt and Jac Chan express their interest in rehab for the Pt prior to his return home and gave permission for referral to both acute and sub acute rehab facilities  CM explained the difference between acute versus sub acute  CM discussed freedom of choice and made the requested referrals  Contacts  Patient Contacts: Jac Chan  Relationship to Patient[de-identified] Family  Contact Method: In Person  Reason/Outcome: Referral, Discharge [address]         Is the patient interested in [address] at discharge?: No    DME Referral Provided  Referral made for DME?: No    Other Referral/Resources/Interventions Provided:  Interventions: Short Term Rehab  Referral Comments: Referrals made to both acute and sub acute rehab         Discharge Destination Plan[de-identified] Short Term Rehab

## 2022-05-13 ENCOUNTER — APPOINTMENT (INPATIENT)
Dept: CT IMAGING | Facility: HOSPITAL | Age: 67
DRG: 064 | End: 2022-05-13
Payer: MEDICARE

## 2022-05-13 PROBLEM — I60.9 SUBARACHNOID HEMORRHAGE (HCC): Status: ACTIVE | Noted: 2022-05-13

## 2022-05-13 LAB
ANION GAP SERPL CALCULATED.3IONS-SCNC: 8 MMOL/L (ref 4–13)
ATRIAL RATE: 65 BPM
BASOPHILS # BLD AUTO: 0.05 THOUSANDS/ΜL (ref 0–0.1)
BASOPHILS NFR BLD AUTO: 1 % (ref 0–1)
BILIRUB UR QL STRIP: NEGATIVE
BUN SERPL-MCNC: 20 MG/DL (ref 5–25)
CALCIUM SERPL-MCNC: 9 MG/DL (ref 8.4–10.2)
CHLORIDE SERPL-SCNC: 109 MMOL/L (ref 96–108)
CLARITY UR: CLEAR
CO2 SERPL-SCNC: 23 MMOL/L (ref 21–32)
COLOR UR: YELLOW
CREAT SERPL-MCNC: 0.92 MG/DL (ref 0.6–1.3)
EOSINOPHIL # BLD AUTO: 0.29 THOUSAND/ΜL (ref 0–0.61)
EOSINOPHIL NFR BLD AUTO: 3 % (ref 0–6)
ERYTHROCYTE [DISTWIDTH] IN BLOOD BY AUTOMATED COUNT: 13.2 % (ref 11.6–15.1)
GFR SERPL CREATININE-BSD FRML MDRD: 85 ML/MIN/1.73SQ M
GLUCOSE SERPL-MCNC: 131 MG/DL (ref 65–140)
GLUCOSE UR STRIP-MCNC: NEGATIVE MG/DL
HCT VFR BLD AUTO: 46.9 % (ref 36.5–49.3)
HGB BLD-MCNC: 15.7 G/DL (ref 12–17)
HGB UR QL STRIP.AUTO: NEGATIVE
IMM GRANULOCYTES # BLD AUTO: 0.06 THOUSAND/UL (ref 0–0.2)
IMM GRANULOCYTES NFR BLD AUTO: 1 % (ref 0–2)
INR PPP: 0.99 (ref 0.84–1.19)
KETONES UR STRIP-MCNC: NEGATIVE MG/DL
LEUKOCYTE ESTERASE UR QL STRIP: NEGATIVE
LYMPHOCYTES # BLD AUTO: 1.1 THOUSANDS/ΜL (ref 0.6–4.47)
LYMPHOCYTES NFR BLD AUTO: 11 % (ref 14–44)
MAGNESIUM SERPL-MCNC: 2 MG/DL (ref 1.9–2.7)
MCH RBC QN AUTO: 29.8 PG (ref 26.8–34.3)
MCHC RBC AUTO-ENTMCNC: 33.5 G/DL (ref 31.4–37.4)
MCV RBC AUTO: 89 FL (ref 82–98)
MONOCYTES # BLD AUTO: 0.88 THOUSAND/ΜL (ref 0.17–1.22)
MONOCYTES NFR BLD AUTO: 9 % (ref 4–12)
NEUTROPHILS # BLD AUTO: 7.75 THOUSANDS/ΜL (ref 1.85–7.62)
NEUTS SEG NFR BLD AUTO: 75 % (ref 43–75)
NITRITE UR QL STRIP: NEGATIVE
NRBC BLD AUTO-RTO: 0 /100 WBCS
P AXIS: 63 DEGREES
PH UR STRIP.AUTO: 6 [PH]
PHOSPHATE SERPL-MCNC: 3.2 MG/DL (ref 2.3–4.1)
PLATELET # BLD AUTO: 248 THOUSANDS/UL (ref 149–390)
PMV BLD AUTO: 10.2 FL (ref 8.9–12.7)
POTASSIUM SERPL-SCNC: 3.9 MMOL/L (ref 3.5–5.3)
PR INTERVAL: 208 MS
PROT UR STRIP-MCNC: NEGATIVE MG/DL
PROTHROMBIN TIME: 13.1 SECONDS (ref 11.6–14.5)
QRS AXIS: 75 DEGREES
QRSD INTERVAL: 80 MS
QT INTERVAL: 400 MS
QTC INTERVAL: 416 MS
RBC # BLD AUTO: 5.27 MILLION/UL (ref 3.88–5.62)
SODIUM SERPL-SCNC: 140 MMOL/L (ref 135–147)
SP GR UR STRIP.AUTO: 1.02 (ref 1–1.03)
T WAVE AXIS: 56 DEGREES
UROBILINOGEN UR QL STRIP.AUTO: 0.2 E.U./DL
VENTRICULAR RATE: 65 BPM
WBC # BLD AUTO: 10.13 THOUSAND/UL (ref 4.31–10.16)

## 2022-05-13 PROCEDURE — 85025 COMPLETE CBC W/AUTO DIFF WBC: CPT | Performed by: NURSE PRACTITIONER

## 2022-05-13 PROCEDURE — 81003 URINALYSIS AUTO W/O SCOPE: CPT | Performed by: NURSE PRACTITIONER

## 2022-05-13 PROCEDURE — 97535 SELF CARE MNGMENT TRAINING: CPT

## 2022-05-13 PROCEDURE — 93010 ELECTROCARDIOGRAM REPORT: CPT | Performed by: INTERNAL MEDICINE

## 2022-05-13 PROCEDURE — 99223 1ST HOSP IP/OBS HIGH 75: CPT | Performed by: PHYSICIAN ASSISTANT

## 2022-05-13 PROCEDURE — 99233 SBSQ HOSP IP/OBS HIGH 50: CPT | Performed by: PSYCHIATRY & NEUROLOGY

## 2022-05-13 PROCEDURE — 85610 PROTHROMBIN TIME: CPT | Performed by: NURSE PRACTITIONER

## 2022-05-13 PROCEDURE — 93005 ELECTROCARDIOGRAM TRACING: CPT

## 2022-05-13 PROCEDURE — 83735 ASSAY OF MAGNESIUM: CPT | Performed by: NURSE PRACTITIONER

## 2022-05-13 PROCEDURE — 99291 CRITICAL CARE FIRST HOUR: CPT | Performed by: NURSE PRACTITIONER

## 2022-05-13 PROCEDURE — G1004 CDSM NDSC: HCPCS

## 2022-05-13 PROCEDURE — 97112 NEUROMUSCULAR REEDUCATION: CPT

## 2022-05-13 PROCEDURE — 80048 BASIC METABOLIC PNL TOTAL CA: CPT | Performed by: NURSE PRACTITIONER

## 2022-05-13 PROCEDURE — 84100 ASSAY OF PHOSPHORUS: CPT | Performed by: NURSE PRACTITIONER

## 2022-05-13 PROCEDURE — 99232 SBSQ HOSP IP/OBS MODERATE 35: CPT | Performed by: PHYSICAL MEDICINE & REHABILITATION

## 2022-05-13 PROCEDURE — 70450 CT HEAD/BRAIN W/O DYE: CPT

## 2022-05-13 RX ORDER — ASPIRIN 81 MG/1
81 TABLET ORAL DAILY
Status: DISCONTINUED | OUTPATIENT
Start: 2022-05-14 | End: 2022-05-15

## 2022-05-13 RX ORDER — HYDRALAZINE HYDROCHLORIDE 20 MG/ML
10 INJECTION INTRAMUSCULAR; INTRAVENOUS EVERY 6 HOURS PRN
Status: DISCONTINUED | OUTPATIENT
Start: 2022-05-13 | End: 2022-05-17 | Stop reason: HOSPADM

## 2022-05-13 RX ADMIN — FENOFIBRATE 48 MG: 48 TABLET, FILM COATED ORAL at 08:15

## 2022-05-13 RX ADMIN — AMLODIPINE BESYLATE 5 MG: 5 TABLET ORAL at 08:15

## 2022-05-13 RX ADMIN — Medication 3 MG: at 21:00

## 2022-05-13 RX ADMIN — ATORVASTATIN CALCIUM 40 MG: 40 TABLET, FILM COATED ORAL at 18:34

## 2022-05-13 RX ADMIN — LISINOPRIL 5 MG: 5 TABLET ORAL at 08:15

## 2022-05-13 NOTE — PLAN OF CARE
Problem: PHYSICAL THERAPY ADULT  Goal: Performs mobility at highest level of function for planned discharge setting  See evaluation for individualized goals  Description: Treatment/Interventions: Functional transfer training,LE strengthening/ROM,Elevations,Therapeutic exercise,Patient/family training,Endurance training,Equipment eval/education,Bed mobility,Gait training          See flowsheet documentation for full assessment, interventions and recommendations  Outcome: Not Progressing  Note: Prognosis: Good  Problem List: Decreased strength, Decreased endurance, Impaired balance, Decreased safety awareness, Obesity, Impaired vision, Impaired sensation, Decreased mobility, Decreased coordination  Assessment: pt shows decline in activity tolerance since previous session w/ decreased ambulation tolerance  pt was noted to have improved left sided awareness from previous session  pt remains at risk for falling and continued inpatient PT to address fall risk and functional impairments  discharge recommendation is for inpatient rehab to maximize level of independence  pt's current goals and discharge recommendation remain appropriate  PT Discharge Recommendation: Post acute rehabilitation services          See flowsheet documentation for full assessment

## 2022-05-13 NOTE — CASE MANAGEMENT
Case Management Progress Note    Patient name [address]  Location ICU 04/ICU 04 MRN [id number]  : 1955 Date 2022       LOS (days): 5  Geometric Mean LOS (GMLOS) (days): 2 20  Days to GMLOS:-2 3        OBJECTIVE:        Current admission status: Inpatient  Preferred Pharmacy:   [address] [address]  [place] [address]  Phone: [phone] Fax: [phone]    Primary Care Provider: Chen Gant MD    Primary Insurance: MEDICARE  Secondary Insurance: [facility] HEALTH OPTIONS PROGRAM    PROGRESS NOTE:      CM consult received for Dispo Planning  Patient was a transferred to ICU SD 1 from the floor over night after he fell with a head strike overnight  CT of the head showed subarachnoid hemorrhages within the right frontal and parietal lobe  CM team yesterday had talked with patient and spouse and initiated the rehab bed search  [facility] acute and STR referrals were made  CM department will continue to follow to assist with discharge coordination

## 2022-05-13 NOTE — PROGRESS NOTES
NEUROLOGY RESIDENCY PROGRESS NOTE     Name: Merari Judd  Age & Sex: 74 y o  male   MRN: [id number]  Unit/Bed#: ICU 04   Encounter: [id number]    ASSESSMENT & PLAN     * Ischemic stroke [facility])  Assessment & Plan  Stroke alert on 5/8/2022 10:24 PM with initial NIHSS of 1 and LKW 1900, initial Blood Pressure: 169/80  Initial presenting deficits were left upper extremity weakness  Inpatient stroke alert on 5/11 due to left facial droop and LUE  As a result of low score pt was determined to not be a candidate for tPA      Exam on 05/13/22: LUE drift, L facial droop   Current Blood Pressure: 113/63, BP over 24 hours: BP  Min: 113/63  Max: 157/70    Home meds: no AP/AC at home    Workup:  Lab Results   Component Value Date    HGBA1C 5 9 (H) 05/08/2022    CHOLESTEROL 206 (H) 05/08/2022    LDLCALC 141 (H) 05/08/2022    TRIG 163 (H) 05/08/2022    INR 0 99 05/13/2022       CTH: no acute findings   CTA: did not receive due to contrast shortage during initial stroke alert   CTA 5/11 during inpatient stroke alert: LORI occlusion with evidence of dissection and L vertebral occlusion   MRI: R hemispheric infarcts watershed territory, MRA head and neck showing R ICA and R MCA occlusions and L vertebral artery stenosis/occlusion   Echocardiogram: LVEF 67%, grad 1 diastolic dysfunction, no LA dilation   o LUCY: LVEF 65% no PFO no atrial thrombus   Telemetry: no events    Pertinent scores:  - NIHSS: 1  Stroke Modified [place] Score: 0 (No baseline symptoms/disability)    Impression: numerous vessel occlusions and 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 discussed with neurosurgery, no intervention and no other recommendations, due to [facility] is only on ASA monotherapy now, was intended for DAPT x 3 months on initial diagnosis    Plan:  - Stroke pathway  - Discussed plan with neurology attending, Dr Molina Service  - BP: <140 due to dissection of ICA  - atorvastatin 40mg qhs  - Maintain glucose <180, SSI for coverage if indicated  - Medical management as per primary team appreciated  - Antiplatelet agents: ASA 81 (was intended for DAPT however pt had traumatic SAH after a fall on  and is now on monotherapy)  - DVT ppx and SCDs  - Monitor on telemetry  - PT/OT/Speech/PM&R input appreciated   - Recommend repeat [facility]  and if stable from [facility] would restart DAPT (ASA + Plavix)      Charo Schwab  will need follow up in in 4 weeks with neurovascular attending  He will not require outpatient neurological testing  SUBJECTIVE     Patient was seen and examined  Pt had fall on slippery floor last evening with head strike  CTH demonstrated small right SAH and was d/c from DAPT  After discussion with ICU and neurosurgery, will go back on monotherapy with ASA as pt has dissection and multi vessel occlusions  Changes to symptoms: improved  Medication changes: DAPT --> ASA  PT/OT/rehab recs: post acute  Mood and sleep: no issues  CTH x 2 for SAH from fall last evening, bleed is stable  No new labs  Stable VS    10 point review of systems conducted and otherwise negative other than as documented above    OBJECTIVE     Patient ID: Charo Schwab  is a 74 y o  male  Vitals:    22 0704 22 0815 22 1102 22 1456   BP: 144/71 144/80 133/74 113/63   BP Location: Left arm  Left arm Left arm   Pulse: (!) 54 63 66 75   Resp: 12 12 14 16   Temp: 97 9 °F (36 6 °C)  97 8 °F (36 6 °C)    TempSrc: Axillary  Oral    SpO2: 97% 95% 95% 91%   Weight:       Height:          Temperature:   Temp (24hrs), Av 8 °F (36 6 °C), Min:97 3 °F (36 3 °C), Max:97 9 °F (36 6 °C)    Temperature: 97 8 °F (36 6 °C)    Neurologic Exam     Mental Status   Oriented to person, place, and time  Cranial Nerves     CN III, IV, VI   Pupils are equal, round, and reactive to light  Extraocular motions are normal      CN V   Facial sensation intact       CN VII   Left facial weakness: central    Motor Exam   Muscle bulk: normal  Overall muscle tone: increased  Right arm pronator drift: absent  Left arm pronator drift: present    Strength   Strength 5/5 except as noted  LUE proximal is 4+/5     Gait, Coordination, and Reflexes     Coordination   Finger to nose coordination: normal       LABORATORY DATA     Labs: I have personally reviewed pertinent reports  Results from last 7 days   Lab Units 05/13/22  0423 05/12/22  0738 05/11/22  1642   WBC Thousand/uL 10 13 9 40 13 54*   HEMOGLOBIN g/dL 15 7 15 7 17 2*   HEMATOCRIT % 46 9 46 0 50 4*   PLATELETS Thousands/uL 248 228 302   NEUTROS PCT % 75 73 68   MONOS PCT % 9 9 10      Results from last 7 days   Lab Units 05/13/22  0423 05/11/22  1645 05/10/22  0342   POTASSIUM mmol/L 3 9 4 0 3 8   CHLORIDE mmol/L 109* 103 107   CO2 mmol/L 23 22 23   BUN mg/dL 20 17 12   CREATININE mg/dL 0 92 1 03 0 86   CALCIUM mg/dL 9 0 9 6 8 6     Results from last 7 days   Lab Units 05/13/22  0423   MAGNESIUM mg/dL 2 0     Results from last 7 days   Lab Units 05/13/22  0423   PHOSPHORUS mg/dL 3 2      Results from last 7 days   Lab Units 05/13/22  0423 05/08/22  2244   INR  0 99 0 87   PTT seconds  --  32     Results from last 7 days   Lab Units 05/11/22  1642   LACTIC ACID mmol/L 2 0           IMAGING & DIAGNOSTIC TESTING     Radiology Results: I have personally reviewed pertinent films in PACS      Other Diagnostic Testing: I have personally reviewed pertinent reports  ACTIVE MEDICATIONS     Current Facility-Administered Medications   Medication Dose Route Frequency    amLODIPine (NORVASC) tablet 5 mg  5 mg Oral Daily    atorvastatin (LIPITOR) tablet 40 mg  40 mg Oral QPM    fenofibrate (TRICOR) tablet 48 mg  48 mg Oral Daily    hydrALAZINE (APRESOLINE) injection 10 mg  10 mg Intravenous Q6H PRN    lisinopril (ZESTRIL) tablet 5 mg  5 mg Oral Daily    melatonin tablet 3 mg  3 mg Oral HS       Prior to Admission medications    Medication Sig Start Date End Date Taking?  Authorizing Provider   amLODIPine (NORVASC) 5 mg tablet Take 1 tablet (5 mg total) by mouth daily 9/22/21  Yes Dell Apley, MD   aspirin 81 mg chewable tablet Chew 1 tablet (81 mg total)  in the morning  5/12/22 8/10/22 Yes Guille Erazo MD   atorvastatin (LIPITOR) 40 mg tablet Take 1 tablet (40 mg total) by mouth every evening 5/11/22 8/9/22 Yes Guille Erazo MD   clopidogrel (PLAVIX) 75 mg tablet Take 1 tablet (75 mg total) by mouth in the morning  5/12/22 8/10/22 Yes Guille Erazo MD   fenofibrate (TRICOR) 48 mg tablet Take 1 tablet (48 mg total) by mouth daily 3/2/22 2/25/23 Yes Dell Apley, MD   lisinopril (ZESTRIL) 5 mg tablet Take 1 tablet (5 mg total) by mouth in the morning   5/12/22 6/11/22 Yes Guille Erazo MD       ==  MD Caitlin Sloan [facility] Neurology Residency, PGY-3

## 2022-05-13 NOTE — PROGRESS NOTES
PCA informed me that patient fall in the bathroom while she helped him to urinate  She stated pt slipped and fall on his buttocks without hitting his head  Vitals and blood sugar were done  Neuro check showed worsen assessment compared to previous one I did  I informed the attending, she came and assessed patient and ordered stat CT of the head

## 2022-05-13 NOTE — PLAN OF CARE
Problem: Potential for Falls  Goal: Patient will remain free of falls  Description: INTERVENTIONS:  - Educate patient/family on patient safety including physical limitations  - Instruct patient to call for assistance with activity   - Consult OT/PT to assist with strengthening/mobility   - Keep Call bell within reach  - Keep bed low and locked with side rails adjusted as appropriate  - Keep care items and personal belongings within reach  - Initiate and maintain comfort rounds  - Make Fall Risk Sign visible to staff  - Offer Toileting every  Hours, in advance of need  - Initiate/Maintain alarm  - Obtain necessary fall risk management equipment:   - Apply yellow socks and bracelet for high fall risk patients  - Consider moving patient to room near nurses station  Outcome: Progressing     Problem: Prexisting or High Potential for Compromised Skin Integrity  Goal: Skin integrity is maintained or improved  Description: INTERVENTIONS:  - Identify patients at risk for skin breakdown  - Assess and monitor skin integrity  - Assess and monitor nutrition and hydration status  - Monitor labs   - Assess for incontinence   - Turn and reposition patient  - Assist with mobility/ambulation  - Relieve pressure over bony prominences  - Avoid friction and shearing  - Provide appropriate hygiene as needed including keeping skin clean and dry  - Evaluate need for skin moisturizer/barrier cream  - Collaborate with interdisciplinary team   - Patient/family teaching  - Consider wound care consult   Outcome: Progressing     Problem: MOBILITY - ADULT  Goal: Maintain or return to baseline ADL function  Description: INTERVENTIONS:  -  Assess patient's ability to carry out ADLs; assess patient's baseline for ADL function and identify physical deficits which impact ability to perform ADLs (bathing, care of mouth/teeth, toileting, grooming, dressing, etc )  - Assess/evaluate cause of self-care deficits   - Assess range of motion  - Assess patient's mobility; develop plan if impaired  - Assess patient's need for assistive devices and provide as appropriate  - Encourage maximum independence but intervene and supervise when necessary  - Involve family in performance of ADLs  - Assess for home care needs following discharge   - Consider OT consult to assist with ADL evaluation and planning for discharge  - Provide patient education as appropriate  Outcome: Progressing

## 2022-05-13 NOTE — ASSESSMENT & PLAN NOTE
· Pt presented to the ED on 5/8/22 and found to have right MCA/ right ICA stroke  Initial NIHSS was 1      · Presented with complaints of transient dizziness, lightheadedness and 2 episodes of left-sided ataxia in which patient stumbled and fell  · Also reports urinary and bowel incontinence since the stroke  · Pt fell on 5/12/22 overnight and was found to have new right sided SAH  · Imaging reviewed personally and by attending  Final results as below  · [facility] 5/13/22: Persistent few patchy hyperdense hemorrhagic abnormalities are seen within the right frontal and parietal regions suggesting subarachnoid or gyral hemorrhage associated with grossly unchanged adjacent hypodense parenchymal abnormalities identified in this context of recent right MCA infarction status post occlusion of the right MCA and internal carotid artery  · CTA stroke alert the neck with without contrast  5/11/22: Persistent long segment occlusion of RIGHT ICA cervical segment (with scattered areas of faint sliver of contrast enhancement) with near-complete occlusion in petrous to proximal cavernous segments, which may be due to thrombosed dissection  Occluded left vertebral artery (origin to distal V1 segment) with distal reconstitution at the level of proximal V2 segment  Question retrograde flow through a patent [place]  · MRI brain wo 5/9/22: Evidence of 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  Recent right hemisphere internal /deep watershed infarcts as well as frontoparietotemporal cortical infarcts  Few tiny occipital infarcts may be embolic  Plan  · Continue regular neurologic checks  · Pain control and ongoing medical management per primary team  · Eval and mobilize per PT/OT  · DVT PPX: SCDs  · Neurology following, recommendations  · Pt was previously started on ASA/Plavix which was held due to the acute SAH  · Will consider resuming monotherapy     · Pt will obtain repeat MRI brain wo later today  · Pt had MRIs of the C/T/L spine ordered due to concern for urinary and bowel incontinence  · Per discussion with Dr Екатерина Briceno  No neurosurgical intervention is anticipated  Ongoing management per neurology  · Neurosurgery will review MRIs when completed  Please call with any questions or concerns

## 2022-05-13 NOTE — ASSESSMENT & PLAN NOTE
· Patient fell with head-strike earlier this evening with no noted loss of consciousness  CT head was completed and noted Right frontal / parietal SAH  New from previous imaging     · Was initially being treated for R MCA CVA on ASA, Plavix, and Lovenox for DVT ppx  · Hold AC/AP for now  · Neurosurgery was consulted, appreciate recommendations  · Neurology following, appreciate recommendations  · Transfer to Step Down 1 under Critical Care Medicine  · Neuro checks q2h  · STAT CT head if > 2 points decrease in GCS  · Repeat CT head at 0800  · Monitor for s/s of worsening bleed  · Avoid hypotension, SBP > 160, hypoxia, and hypoglycemia  · Continue 1:1 sitter  · Nursing bedside dysphagia evaluation -- if passes will restart diet  · PT/OT consult

## 2022-05-13 NOTE — PROGRESS NOTES
[facility]  ICU Transfer / Acceptance Note - Jaymie Tinoco 1955, 74 y o  male MRN: [id number]  Unit/Bed#: S -01 Encounter: [id number]  Primary Care Provider: Luiz Martins MD   Date and time admitted to hospital: 5/8/2022 10:24 PM    Subarachnoid hemorrhage [facility])  Assessment & Plan  Patient fell with head-strike earlier this evening with no noted loss of consciousness  CT head was completed and noted Right frontal / parietal SAH  New from previous imaging  Was initially being treated for R MCA CVA on ASA, Plavix, and Lovenox for DVT ppx  Hold AC/AP for now  Neurosurgery was consulted, appreciate recommendations  Neurology following, appreciate recommendations  Transfer to Step Down 1 under Critical Care Medicine  Neuro checks q2h  STAT CT head if > 2 points decrease in GCS  Repeat CT head at 0800  Monitor for s/s of worsening bleed  Avoid hypotension, SBP > 160, hypoxia, and hypoglycemia  Continue 1:1 sitter  Nursing bedside dysphagia evaluation -- if passes will restart diet  PT/OT consult    * Ischemic stroke [facility])  Assessment & Plan  Patient initially presented 5/9 with c/o transient dizziness, lightheadedness and 2 episodes of Left-sided ataxia  Reportedly on  evaluation in the ER was found to have subtle deficits of Left arm pronator drift and subtle confusion  5/9 CT head: No acute territorial infarct, hemorrhage, or midline shift  Asymmetric hyperattenuated appearance distal right M1/perisylvian branches of the right MCA suspicious for acute thrombus given patient symptomatology  5/9 MRI brain: Evidence of 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  Recent right hemisphere internal /deep watershed infarcts as well as frontoparietotemporal cortical infarcts  Few tiny occipital infarcts may be embolic  He was admitted to the hospitalist service with consult to neurology    He was started on aspirin, plavix, atorvastatin, and DVT ppx with Lovenox  5/11 Rapid Response was called for change in mental status with suspected vasovagal event  CT head: Persistent long segment occlusion of RIGHT ICA cervical segment (with scattered areas of faint sliver of contrast enhancement) with near-complete occlusion in petrous to proximal cavernous segments, which may be due to thrombosed dissection  Occluded left vertebral artery (origin to distal V1 segment) with distal reconstitution at the level of proximal V2 segment  Question retrograde flow through a patent [place]  5/12 around 22-23:00 patient fell with head-strike  CT head: Subarachnoid hemorrhages within the right frontal and parietal lobe  No midline shift or hydrocephalus  Transfer patient to Step Down 1  Hold further AP/AC  Continue Atorvastatin  Avoid hypo/hypertension  Continue frequent neuro exams  PT/OT consult  PMR consulted, appreciate recommendations      Urinary incontinence  Assessment & Plan  Urinary retention protocol  Frequent voiding attempts    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[facility])  Assessment & Plan  5/9 MRI carotids: Occluded right cervical and visualized intracranial internal carotid artery  Poor visualization of the left vertebral artery suspected developmental nondominant rather than occlusion or stenosis  5/11 CTA head and neck: Persistent long segment occlusion of RIGHT ICA cervical segment (with scattered areas of faint sliver of contrast enhancement) with near-complete occlusion in petrous to proximal cavernous segments, which may be due to thrombosed dissection  Occluded left vertebral artery (origin to distal V1 segment) with distal reconstitution at the level of proximal V2 segment  Question retrograde flow through a patent [place]    Neurosurgery consulted, appreciate recommendations  Holding AC/AP in the setting of new SAH  Neuro checks as above  Avoid hypotension/hypertension    Hypertension, essential  Assessment & Plan  Blood pressure is currently stable  Continue amlodipine and lisinopril   Avoid hypo/hypertension    Hyperlipidemia  Assessment & Plan  Continue atorvastatin and fenofibrate      ----------------------------------------------------------------------------------------  HPI/24hr events:   Patient initially presented 5/9 with c/o transient dizziness, lightheadedness and to upset the left-sided ataxia  Reportedly on evaluation in the ER was found have subtle deficits of left arm pronator drift and subtle confusion  Initial NIH 1 on admission, due to low NIH score patient was deemed to not be tPA candidate  Patient was initially admitted 5/9 with right MCA CVA  Was started on aspirin, plavix, and DVT ppx with Lovenox  5/11 stroke alert for change in mental status  CT head without new evidence of CVA  5/12 fall with head strike overnight  CT head now with new right frontoparietal subarachnoid hemorrhage  Patient transferred to step-down level 1 under critical care medicine  Patient appropriate for transfer out of the ICU today?: No  Disposition: Transfer to Stepdown Level 1   Code Status: Level 1 - Full Code  ---------------------------------------------------------------------------------------  SUBJECTIVE  Patient states that he fell earlier this evening with head strike  Denies loss of consciousness  Denies headache, blurred vision, shortness of breath, chest pain, abdominal pain, nausea/vomiting  He does state that he be feels the left side of his face is weak, and his voice is not normal     Review of Systems   Constitutional: Negative for chills, fatigue and fever  HENT: Negative  Negative for facial swelling and trouble swallowing  Eyes: Negative for photophobia and visual disturbance  Respiratory: Negative for cough, chest tightness and shortness of breath  Cardiovascular: Negative for chest pain, palpitations and leg swelling     Gastrointestinal: Negative for abdominal distention, abdominal pain, diarrhea, nausea and vomiting  Endocrine: Negative  Genitourinary: Negative for decreased urine volume, difficulty urinating, flank pain and frequency  Musculoskeletal: Negative for arthralgias and myalgias  Skin: Negative for pallor, rash and wound  Allergic/Immunologic: Negative  Neurological: Positive for weakness  Negative for dizziness, seizures, syncope and headaches  Hematological: Negative  Psychiatric/Behavioral: Negative for agitation, confusion, hallucinations and self-injury  Review of systems was reviewed and negative unless stated above in HPI/24-hour events   ---------------------------------------------------------------------------------------  OBJECTIVE    Vitals   Vitals:    22 1500 22 2159 22 0100   BP: 127/75 157/70 143/72 137/74   BP Location: Right arm Right arm Right arm Right arm   Pulse: 76 83  65   Resp: 18 16 18 18   Temp: 98 2 °F (36 8 °C) 97 9 °F (36 6 °C)  97 9 °F (36 6 °C)   TempSrc: Oral Oral  Oral   SpO2: 96% 93%  93%   Weight:       Height:         Temp (24hrs), Av °F (36 7 °C), Min:97 9 °F (36 6 °C), Max:98 2 °F (36 8 °C)  Current: Temperature: 97 9 °F (36 6 °C)          Respiratory:  SpO2: SpO2: 93 %, SpO2 Device: O2 Device: Nasal cannula  Nasal Cannula O2 Flow Rate (L/min): 2 L/min    Invasive/non-invasive ventilation settings   Respiratory  Report     Lab Data (Last 4 hours)      None           O2/Vent Data (Last 4 hours)      None                    Physical Exam  Vitals and nursing note reviewed  Constitutional:       General: He is not in acute distress  Appearance: Normal appearance  He is overweight  He is not ill-appearing, toxic-appearing or diaphoretic  HENT:      Head: Normocephalic and atraumatic  Right Ear: External ear normal       Left Ear: External ear normal       Nose: Nose normal  No congestion or rhinorrhea        Mouth/Throat:      Mouth: Mucous membranes are moist       Pharynx: Oropharynx is clear  No oropharyngeal exudate or posterior oropharyngeal erythema  Eyes:      General: No scleral icterus  Extraocular Movements: Extraocular movements intact  Conjunctiva/sclera: Conjunctivae normal       Pupils: Pupils are equal, round, and reactive to light  Neck:      Vascular: No carotid bruit  Cardiovascular:      Pulses: Normal pulses  Heart sounds: Normal heart sounds  No murmur heard  No friction rub  No gallop  Pulmonary:      Effort: Pulmonary effort is normal       Breath sounds: Normal breath sounds  No stridor  No wheezing or rhonchi  Chest:      Chest wall: No tenderness  Abdominal:      General: Abdomen is flat  Bowel sounds are normal  There is no distension  Palpations: Abdomen is soft  Tenderness: There is no abdominal tenderness  Musculoskeletal:         General: No swelling or tenderness  Normal range of motion  Cervical back: Normal range of motion and neck supple  Right lower leg: No edema  Left lower leg: No edema  Lymphadenopathy:      Cervical: No cervical adenopathy  Skin:     General: Skin is warm and dry  Capillary Refill: Capillary refill takes less than 2 seconds  Coloration: Skin is not pale  Findings: No bruising, erythema or rash  Neurological:      Mental Status: He is alert  GCS: GCS eye subscore is 4  GCS verbal subscore is 5  GCS motor subscore is 6  Cranial Nerves: Cranial nerve deficit (Left facial droop) and dysarthria present  Sensory: Sensation is intact  Motor: Motor function is intact  No weakness or tremor  Coordination: Coordination is intact  Finger-Nose-Finger Test and Heel to NIX [facility] Test normal       Comments: PERRLA, EOM intact  No evidence of pronator drift  BUE 5/5 str, BLE 5/5 Str  No ataxia noted in finger-to-nose or heel-to-shin   Psychiatric:         Mood and Affect: Mood normal          Behavior: Behavior normal  Behavior is cooperative           Thought Content: Thought content normal          Judgment: Judgment normal              Laboratory and Diagnostics:  Results from last 7 days   Lab Units 05/12/22  0738 05/11/22  1642 05/10/22  0342 05/09/22  0424 05/08/22  2244   WBC Thousand/uL 9 40 13 54* 9 22 8 99 8 55   HEMOGLOBIN g/dL 15 7 17 2* 15 6 15 3 15 7   HEMATOCRIT % 46 0 50 4* 46 2 46 6 46 4   PLATELETS Thousands/uL 228 302 218 245 248   NEUTROS PCT % 73 68  --  76*  --    MONOS PCT % 9 10  --  7  --      Results from last 7 days   Lab Units 05/11/22  1645 05/10/22  0342 05/09/22  0424 05/08/22  2244   SODIUM mmol/L 136 139 140 139   POTASSIUM mmol/L 4 0 3 8 3 9 4 3   CHLORIDE mmol/L 103 107 109* 108   CO2 mmol/L 22 23 24 23   ANION GAP mmol/L 11 9 7 8   BUN mg/dL 17 12 12 14   CREATININE mg/dL 1 03 0 86 0 87 0 93   CALCIUM mg/dL 9 6 8 6 9 3 9 3   GLUCOSE RANDOM mg/dL 131 114 134 198*          Results from last 7 days   Lab Units 05/08/22  2244   INR  0 87   PTT seconds 32          Results from last 7 days   Lab Units 05/11/22  1642   LACTIC ACID mmol/L 2 0     ABG:    VBG:          Micro        EKG: sinus rhythm    Imaging: I have personally reviewed pertinent reports  and I have personally reviewed pertinent films in PACS    Intake and Output  I/O         05/11 0701 05/12 0700 05/12 0701 05/13 0700    P  O  240 600    I V  (mL/kg) 250 (2 2)     Total Intake(mL/kg) 490 (4 3) 600 (5 3)    Urine (mL/kg/hr)  200 (0 1)    Total Output  200    Net +490 +400          Unmeasured Urine Occurrence 2 x 4 x    Unmeasured Stool Occurrence 1 x             Height and Weights   Height: 6' (182 9 cm)  IBW (Ideal Body Weight): 77 6 kg  Body mass index is 33 91 kg/m²    Weight (last 2 days)       Date/Time Weight    05/11/22 0901 113 (250)              Nutrition       Diet Orders   (From admission, onward)                 Start     Ordered    05/13/22 0319  Diet NPO  Diet effective now        References:    Nutrtion Support Algorithm Enteral vs  Parenteral   Question Answer Comment   Diet Type NPO    RD to adjust diet per protocol? Yes        05/13/22 0318                      Active Medications  Scheduled Meds:  Current Facility-Administered Medications   Medication Dose Route Frequency Provider Last Rate    amLODIPine  5 mg Oral Daily CARLOS ALBERTO Spence      atorvastatin  40 mg Oral QPM Bao [place] Benton, CARLOS ALBERTO      fenofibrate  48 mg Oral Daily [facility] CRLEN      lisinopril  5 mg Oral Daily Cheryll Oxford Benton, CRNP      melatonin  3 mg Oral HS Bao [place] Benton, CARLOS ALBERTO       Continuous Infusions:     PRN Meds:        Invasive Devices Review  Invasive Devices  Report      Peripheral Intravenous Line  Duration             Peripheral IV 05/12/22 Right;Ventral (anterior) Wrist <1 day                    Rationale for remaining devices:   PIV:  IV access/medications  ---------------------------------------------------------------------------------------  Advance Directive and Living Will:      Power of :    POLST:    ---------------------------------------------------------------------------------------  Care Time Delivered:   Upon my evaluation, this patient had a high probability of imminent or life-threatening deterioration due to New right frontoparietal subarachnoid hemorrhage, right MCA CVA, carotid dissection, which required my direct attention, intervention, and personal management  I have personally provided 30 minutes ( to [phone]) of critical care time, exclusive of procedures, teaching, family meetings, and any prior time recorded by providers other than myself  CARLOS ALBERTO Spence      Portions of the record may have been created with voice recognition software  Occasional wrong word or "sound a like" substitutions may have occurred due to the inherent limitations of voice recognition software  Read the chart carefully and recognize, using context, where substitutions have occurred

## 2022-05-13 NOTE — ASSESSMENT & PLAN NOTE
Stroke alert on 5/8/2022 10:24 PM with initial NIHSS of 1 and LKW 1900, initial Blood Pressure: 169/80  Initial presenting deficits were left upper extremity weakness  Inpatient stroke alert on 5/11 due to left facial droop and LUE   RRT on 5/12 for mechanical fall CTH + for small right parietal SAH  As a result of low score pt was determined to not be a candidate for tPA   Exam on 05/14/22: LUE drift, L facial droop - unchanged   Current Blood Pressure: 118/56, BP over 24 hours: BP  Min: 113/63  Max: 145/76    Home meds: no AP/AC at home    Workup:  Lab Results   Component Value Date    HGBA1C 5 9 (H) 05/08/2022    CHOLESTEROL 206 (H) 05/08/2022    LDLCALC 141 (H) 05/08/2022    TRIG 163 (H) 05/08/2022    INR 0 99 05/13/2022       CTH: no acute findings   CTA: did not receive due to contrast shortage during initial stroke alert   CTA 5/11 during inpatient stroke alert: LORI occlusion with evidence of dissection and L vertebral occlusion   MRI: R hemispheric infarcts watershed territory, MRA head and neck showing R ICA and R MCA occlusions and L vertebral artery stenosis/occlusion      Echocardiogram: LVEF 54%, grad 1 diastolic dysfunction, no LA dilation   o LUYC: LVEF 65% no PFO no atrial thrombus   Telemetry: no events    Pertinent scores:  - NIHSS: 2  Stroke Modified Ct Score: 0 (No baseline symptoms/disability)    Impression: numerous vessel occlusions and 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 discussed with neurosurgery, on DAPT after small traumatic SAH noted to be stable on imaging    Plan:  - Stroke pathway  - Discussed plan with neurology attending, Dr Burnette Labrador  - BP: <140 due to dissection of ICA  - atorvastatin 40mg qhs  - Maintain glucose <180, SSI for coverage if indicated  - Medical management as per primary team appreciated  - Antiplatelet agents: DAPT with ASA and Plavix as CTH was stable as of 5/14  - DVT ppx and SCDs  - Monitor on telemetry  - PT/OT/Speech/PM&R input appreciated

## 2022-05-13 NOTE — ASSESSMENT & PLAN NOTE
· Patient initially presented 5/9 with c/o transient dizziness, lightheadedness and 2 episodes of Left-sided ataxia  Reportedly on  evaluation in the ER was found to have subtle deficits of Left arm pronator drift and subtle confusion  · 5/9 CT head: No acute territorial infarct, hemorrhage, or midline shift  Asymmetric hyperattenuated appearance distal right M1/perisylvian branches of the right MCA suspicious for acute thrombus given patient symptomatology  · 5/9 MRI brain: Evidence of 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  · Recent right hemisphere internal /deep watershed infarcts as well as frontoparietotemporal cortical infarcts  Few tiny occipital infarcts may be embolic  · He was admitted to the hospitalist service with consult to neurology  · He was started on aspirin, plavix, atorvastatin, and DVT ppx with Lovenox  · 5/11 Rapid Response was called for change in mental status with suspected vasovagal event  · CT head: Persistent long segment occlusion of RIGHT ICA cervical segment (with scattered areas of faint sliver of contrast enhancement) with near-complete occlusion in petrous to proximal cavernous segments, which may be due to thrombosed dissection  Occluded left vertebral artery (origin to distal V1 segment) with distal reconstitution at the level of proximal V2 segment  Question retrograde flow through a patent [place]  · 5/12 around 22-23:00 patient fell with head-strike  CT head: Subarachnoid hemorrhages within the right frontal and parietal lobe  No midline shift or hydrocephalus    · Transfer patient to Step Down 1  · Hold further AP/AC  · Continue Atorvastatin  · Avoid hypo/hypertension  · Continue frequent neuro exams  · PT/OT consult  · PMR consulted, appreciate recommendations

## 2022-05-13 NOTE — ASSESSMENT & PLAN NOTE
· 5/9 MRI carotids: Occluded right cervical and visualized intracranial internal carotid artery  Poor visualization of the left vertebral artery suspected developmental nondominant rather than occlusion or stenosis  · 5/11 CTA head and neck: Persistent long segment occlusion of RIGHT ICA cervical segment (with scattered areas of faint sliver of contrast enhancement) with near-complete occlusion in petrous to proximal cavernous segments, which may be due to thrombosed dissection  Occluded left vertebral artery (origin to distal V1 segment) with distal reconstitution at the level of proximal V2 segment  Question retrograde flow through a patent [place]    · Neurosurgery consulted, appreciate recommendations  · Holding AC/AP in the setting of new SAH  · Neuro checks as above  · Avoid hypotension/hypertension

## 2022-05-13 NOTE — PROGRESS NOTES
PM&R Consult Follow Up Note  Deya Bear  74 y o  male MRN: [id number]  Unit/Bed#: ICU 04 Encounter: [id number]     Assessment:  Rehabilitation Diagnosis:   · R MCA CVA and now brain injury with R SAH  After fall  · L sided weakness - increased today  · Impaired motor planning, processing - improved today  · Bowel/bladder incontinence/disinhibition  · L inattention - but responds well to cuing  · Possibly new dysphagia   · Impaired sequencing  · Impaired divided attention  · Risk of cognitive fatigue  · Impaired mobility and self care   · Impaired cognition     Recommendations:  Rehabilitation Plan:  · Please repeat PT/OT evals after event yesterday  · Please repeat SLP swallow eval for patient with coughing and some difficulty noted yesterday  · I saw the patient with physical therapy, and based on that eval alone, I suspect he will be a good candidate for [facility]  ? He has L sided weakness, increased facial droop, and is at risk for dysphagia  ? He has L inattention with functional activities impacting his balance  ? He has significant motor planning difficulties requiring multiple verbal, visual, and physical cues for sequencing with activities like transfers and bathroom activities  ? He has bowel/bladder incontinence (possible UTI vs  Disinhibited due to central process) and will need bowel/bladder training  ? He may have some cognitive fatigue as well  ? Would benefit from rehab psychology as well for emotional adjustment to impairments  ? Will need close monitoring of neuro status  ? Still needs surveillance CTH to determine plan for AP/AC and DVT PPx  ? He can tolerate 3-5 hours of therapy, 5-7 days a week  ? He will need rehab nursing and PT/OT/SLP  ? He has potential to make marked  And significant gains  ? His wife is open to family training, and plans to bring him home after rehab  · Wife's first choice is [facility]  Per wife, she discussed with them and they are following his progress        Medical Co-morbidities Pending Issues:  R MCA CVA  - Initial MRI showed recent R hemisphere internal/deep watershed infarcts as well as frontoparietotemporal cortical infarcts with few tiny occipital infarcts that may be embolic    - w/ occluded R ICA in both cervical and intracranial portions   - Per Nsx, no Nsx intervention or follow-up required for this  - 5/11 with worsening L sided weakness and facial droop               - CTH did not show new lesion               - Repeat MRI is currently pending  - Statin, DAPT held due to new SAH  - Risk factor modification  - Outpatient f/u with Neurovascular clinic    R SAH  - After fall with headstrike  - Now in [place]  - Worsening L sided weakness, increased speech difficulties  CTH showed R fronto/parietal SAH   - Repeat CTH 5/13 without definite interval changes  - Recommend repeat SLP eval       HTN  - Lisinopril, Amlodipine     Bowel/bladder incontinence  - Disinhibited  Has been since admission    - UA negative  - If negative, recommend timed voids Q4-6 hour    - PVR x1 65  Would continue timed voids to help maintain continence       #Vitals: Stable  #Labs: BMP stable, CBC improved WBC to normal, otherwise stable  #Pain: Not an issue currently  #Bowel: Last BM 5/12 and continent   #Bladder: Bladder incontinence noted  #Skin/Pressure Injury Prevention: Turn Q2hr in bed, with weight shifts [id number] in wheelchair  #DVT Prophylaxis: Lovenox, SCDs  #GI Prophylaxis: PO diet    Thank you for allowing the PM&R service to participate in the care of Mr Mary Blue    We will continue to follow he progress with you  Please do not hesitate to call with questions or concerns  Last Seen: 05/12/2022    Interval History/Subjective:    Since last seen, patient fell overnight + head strike  No LOC  On eval, noted to have LUE/LLE weakness, transient word finding difficulty and word salad per resident note   CTH showed R frontal/parietal SAH - he was previously on ASA, Plavix, Lovenox for DVT ppx  All AC/AP currently being held and NSx consulted  Patient was transferred to [facility] under CCM  Patient states he is feeling well this morning  Denies any headache, double/blurry vision, lightheadedness/dizziness  Denies any CP, SOB, palpitations  Denies any N/V, abdominal pain  ROS: A 10 point review of systems was negative except for what is noted in the HPI  Physical Therapy: modA bed mobility, modA transfers, modA ambualtion 5' with RW     Occupational Therapy: pending    Speech Therapy: pending       Vital Signs:      Temp:  [97 3 °F (36 3 °C)-98 2 °F (36 8 °C)] 97 8 °F (36 6 °C)  HR:  [54-83] 66  Resp:  [12-18] 14  BP: (127-157)/(66-80) 133/74   Intake/Output Summary (Last 24 hours) at 5/13/2022 1146  Last data filed at 5/13/2022 0937  Gross per 24 hour   Intake 480 ml   Output 625 ml   Net -145 ml        Gen: No acute distress  HEENT: Moist mucus membranes, Normocephalic/Atraumatic  Cardiovascular: Regular rate, rhythm, S1/S2  Distal pulses palpable  Heme/Extr: No edema  Pulmonary: Non-labored breathing  Lungs CTAB  : No garcia  GI: Soft, non-tender, non-distended  BS+  MSK: PROM is WFL in all extremities  No effusions or deformities  Bulk is symmetric  See below for MMT scores  Integumentary: Skin is warm, dry  Neuro: AAOx3, CN 2-12 intact except for L lower facial droop  Speech mildly dysarthric but perfectly intelligible  Answering questions appropriately  L inattention  Did better with processing and planning today  Tone is normal  FTN difficulty more related to weakness than ataxia     MMT:   Strength:   Right  Left  Site  Right  Left  Site    5 2-  S Ab: Shoulder Abductors  5  3 HF: Hip Flexors    5 5  EF: Elbow Flexors  NT  NT KF: Knee Flexors    5  5  EE: Elbow Extensors  5  4  KE: Knee Extensors    5  2  WE: Wrist Extensors  5  5  DR: Dorsi Flexors    5  4  FF: Finger Flexors  5  5  PF: Plantar Flexors    5  2  HI: Hand Intrinsics  5  4  EHL: Extensor Hallucis Longus   Psych: Normal mood and affect  Laboratory:      Lab Results   Component Value Date    HGB 15 7 05/13/2022    HGB 16 3 10/06/2016    HCT 46 9 05/13/2022    HCT 49 8 10/06/2016    WBC 10 13 05/13/2022    WBC 7 6 10/06/2016     Lab Results   Component Value Date    BUN 20 05/13/2022    BUN 13 10/06/2016     10/06/2016    K 3 9 05/13/2022    K 3 8 10/06/2016     (H) 05/13/2022     10/06/2016    CREATININE 0 92 05/13/2022    CREATININE 1 09 10/06/2016     Lab Results   Component Value Date    PROTIME 13 1 05/13/2022    INR 0 99 05/13/2022        Imaging (reviewed):  Repeat MRI pending still  5/12 CTH:   Subarachnoid hemorrhages within the right frontal and parietal lobe    No midline shift or hydrocephalus    5/13 CTH:  No definite interval changes identified with persistent findings related to recent right frontoparietal MCA infarction associated with small patchy hemorrhagic competence as described above

## 2022-05-13 NOTE — CONSULTS
[address] [facility]  1955, 74 y o  male MRN: [id number]  Unit/Bed#: ICU 04 Encounter: [id number]  Primary Care Provider: Selena William MD   Date and time admitted to hospital: 5/8/2022 10:24 PM    Inpatient consult to Neurosurgery  Consult performed by: Christiano Cabrera PA-C  Consult ordered by: CARLOS ALBERTO Spence          * Ischemic stroke [facility])  Assessment & Plan  · Pt presented to the ED on 5/8/22 and found to have right MCA/ right ICA stroke  Initial NIHSS was 1      · Presented with complaints of transient dizziness, lightheadedness and 2 episodes of left-sided ataxia in which patient stumbled and fell  · Also reports urinary and bowel incontinence since the stroke  · Pt fell on 5/12/22 overnight and was found to have new right sided SAH  · Imaging reviewed personally and by attending  Final results as below  · [facility] 5/13/22: Persistent few patchy hyperdense hemorrhagic abnormalities are seen within the right frontal and parietal regions suggesting subarachnoid or gyral hemorrhage associated with grossly unchanged adjacent hypodense parenchymal abnormalities identified in this context of recent right MCA infarction status post occlusion of the right MCA and internal carotid artery  · CTA stroke alert the neck with without contrast  5/11/22: Persistent long segment occlusion of RIGHT ICA cervical segment (with scattered areas of faint sliver of contrast enhancement) with near-complete occlusion in petrous to proximal cavernous segments, which may be due to thrombosed dissection  Occluded left vertebral artery (origin to distal V1 segment) with distal reconstitution at the level of proximal V2 segment  Question retrograde flow through a patent [place] of [place]  · MRI brain wo 5/9/22: Evidence of 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   Recent right hemisphere internal /deep watershed infarcts as well as frontoparietotemporal cortical infarcts  Few tiny occipital infarcts may be embolic  Plan  · Continue regular neurologic checks  · Pain control and ongoing medical management per primary team  · Eval and mobilize per PT/OT  · DVT PPX: SCDs  · Neurology following, recommendations  · Pt was previously started on ASA/Plavix which was held due to the acute SAH  · Will consider resuming monotherapy  · Pt will obtain repeat MRI brain wo later today  · Pt had MRIs of the C/T/L spine ordered due to concern for urinary and bowel incontinence  · Per discussion with Dr Ana Landry  No neurosurgical intervention is anticipated  Ongoing management per neurology  · Neurosurgery will review MRIs when completed  Please call with any questions or concerns  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[facility])  Assessment & Plan  Per discussion with Dr Ana Landry, no nsx intervention anticipated  No follow up required by nsx  Ongoing management and follow up per neurology  Subarachnoid hemorrhage ([facility] [address] )  Assessment & Plan  Pt found to have right SAHs s/p fall in the setting of acute R  MCA stroke  Repeat CTH this am stable  Plan  · Continue frequent neurological checks  · STAT CT head with any neurological decline or a decrease in GCS of 2pts within 1 hr  · AC/AP management per neurology  · Mobilize and eval by PT/OT when able to  · DVT PPX: SCDs, hold pharm dvt ppx till cleared to restart by neurology  · Ongoing medical management per primary team  · No neurosurgical intervention is anticipated at this time  History of Present Illness   History, ROS and PFSH obtained from patient, his wife and chart review  HPI: Shena Mackey  is a 74 y o  male with PMH including elevated cholesterol who presented to the ED on 5/8/22 and found to have right MCA/ right ICA stroke  Initial NIHSS was 1  Pt was not given tPA and did not require thrombectomy   Pt presented with complaints of transient dizziness, lightheadedness and 2 episodes of left-sided weakness and ataxia in which patient stumbled and fell  Pt's wife also reports he has had urinary and bowel incontinence since the stroke  Pt fell last night and was found to have new right sided subarachnoid hemorrhages  Today patient admits he continues to have left sided weakness and numbness  He denies having headache or dizziness  He denies changes in his vision or hearing  He denies nausea or vomiting  Pt reports he continues to have urinary incontinence  He denies bowel incontinence but his wife reports he has been incontinent of bowel and required assistance with cleaning up during this admission  Pt was accompanied by his wife at bedside  Review of Systems   Constitutional: Negative for activity change, chills and fever  HENT: Negative for hearing loss  Eyes: Negative for photophobia and pain  Respiratory: Negative for chest tightness and shortness of breath  Cardiovascular: Negative for chest pain and palpitations  Gastrointestinal: Negative for abdominal pain, nausea and vomiting  Genitourinary: Negative for difficulty urinating and dysuria  Musculoskeletal: Positive for gait problem  Negative for neck pain and neck stiffness  Skin: Negative for color change, rash and wound  Neurological: Positive for dizziness, weakness and light-headedness  Negative for seizures  Psychiatric/Behavioral: Negative for confusion and decreased concentration  Historical Information   Past Medical History:   Diagnosis Date    Elevated cholesterol with high triglycerides      Past Surgical History:   Procedure Laterality Date    HERNIA REPAIR Left     inguinal    GA COLONOSCOPY FLX DX W/COLLJ SPEC WHEN PFRMD N/A 2/24/2017    Procedure: COLONOSCOPY;  Surgeon: Fam Dixon MD;  Location: [facility];   Service: Colorectal     Social History     Substance and Sexual Activity   Alcohol Use Yes    Comment: 1-2 per week; DENIED: HISTORY OF ALCOHOL USE AS PER ALL SCRIPTS     Social History     Substance and Sexual Activity   Drug Use No     Social History     Tobacco Use   Smoking Status Never Smoker   Smokeless Tobacco Never Used     Family History   Problem Relation Age of Onset    Diabetes Mother     Ulcerative colitis Son     Stroke Father         SYNDROME        Meds/Allergies   all current active meds have been reviewed, current meds:   Current Facility-Administered Medications   Medication Dose Route Frequency    amLODIPine (NORVASC) tablet 5 mg  5 mg Oral Daily    atorvastatin (LIPITOR) tablet 40 mg  40 mg Oral QPM    fenofibrate (TRICOR) tablet 48 mg  48 mg Oral Daily    hydrALAZINE (APRESOLINE) injection 10 mg  10 mg Intravenous Q6H PRN    lisinopril (ZESTRIL) tablet 5 mg  5 mg Oral Daily    melatonin tablet 3 mg  3 mg Oral HS    and PTA meds:   Prior to Admission Medications   Prescriptions Last Dose Informant Patient Reported? Taking? amLODIPine (NORVASC) 5 mg tablet 5/8/2022 at Unknown time  No Yes   Sig: Take 1 tablet (5 mg total) by mouth daily   fenofibrate (TRICOR) 48 mg tablet 5/8/2022 at Unknown time  No Yes   Sig: Take 1 tablet (48 mg total) by mouth daily      Facility-Administered Medications: None     Allergies   Allergen Reactions    Penicillins Rash       Objective   I/O       05/11 0701  05/12 0700 05/12 0701  05/13 0700 05/13 0701  05/14 0700    P  O  240 600     I V  (mL/kg) 250 (2 2)      Total Intake(mL/kg) 490 (4 3) 600 (5 5)     Urine (mL/kg/hr)  200 (0 1) 425 (1 4)    Total Output  200 425    Net +490 +400 -425           Unmeasured Urine Occurrence 2 x 4 x     Unmeasured Stool Occurrence 1 x            Physical Exam  Constitutional:       Appearance: He is well-developed  HENT:      Head: Normocephalic and atraumatic  Eyes:      General: No scleral icterus  Extraocular Movements: Extraocular movements intact        Conjunctiva/sclera: Conjunctivae normal       Pupils: Pupils are equal, round, and reactive to light  Neck:      Trachea: No tracheal deviation  Cardiovascular:      Rate and Rhythm: Normal rate  Pulmonary:      Effort: Pulmonary effort is normal    Abdominal:      Palpations: Abdomen is soft  Tenderness: There is no abdominal tenderness  There is no guarding  Musculoskeletal:      Cervical back: Neck supple  Skin:     General: Skin is warm and dry  Coloration: Skin is not pale  Findings: No rash  Neurological:      Mental Status: He is alert and oriented to person, place, and time  Comments: Awake, Oriented x 3    Motor: ALAN, Strength LUE: SF/EF/IO 4/5, RUE/BLE 5/5  Sensation: Decreased LUE laterally and below the knee on the left  JPS right hallux 3/3, left hallux 2/3  Reflexes: 2+ and symmetric except left brachioradialis 3+, no lewis's or clonus     Coordination: Drift on the left, no drift on the right  Psychiatric:         Speech: Speech normal        Neurologic Exam     Mental Status   Oriented to person, place, and time  Oriented to person  Oriented to place  Oriented to time  Attention: decreased (Short attention span)  Speech: speech is normal   Level of consciousness: alert    Cranial Nerves     CN II   Visual fields full to confrontation  CN III, IV, VI   Pupils are equal, round, and reactive to light  CN VII   Left facial weakness: peripheral    CN VIII   Hearing: intact    CN XII   Fasciculations: absent        Vitals:Blood pressure 144/80, pulse 63, temperature 97 9 °F (36 6 °C), temperature source Axillary, resp  rate 12, height 6' (1 829 m), weight 110 kg (242 lb 11 6 oz), SpO2 95 %  ,Body mass index is 32 92 kg/m²       Lab Results:   Results from last 7 days   Lab Units 05/13/22  0423 05/12/22  0738 05/11/22  1642   WBC Thousand/uL 10 13 9 40 13 54*   HEMOGLOBIN g/dL 15 7 15 7 17 2*   HEMATOCRIT % 46 9 46 0 50 4*   PLATELETS Thousands/uL 248 228 302   NEUTROS PCT % 75 73 68   MONOS PCT % 9 9 10     Results from last 7 days   Lab Units 05/13/22  0423 05/11/22  1645 05/10/22  0342   POTASSIUM mmol/L 3 9 4 0 3 8   CHLORIDE mmol/L 109* 103 107   CO2 mmol/L 23 22 23   BUN mg/dL 20 17 12   CREATININE mg/dL 0 92 1 03 0 86   CALCIUM mg/dL 9 0 9 6 8 6     Results from last 7 days   Lab Units 05/13/22  0423   MAGNESIUM mg/dL 2 0     Results from last 7 days   Lab Units 05/13/22  0423   PHOSPHORUS mg/dL 3 2     Results from last 7 days   Lab Units 05/13/22  0423 05/08/22  2244   INR  0 99 0 87   PTT seconds  --  32         Imaging Studies: I have personally reviewed pertinent reports  and I have personally reviewed pertinent films in PACS    EKG, Pathology, and Other Studies: I have personally reviewed pertinent reports  VTE Prophylaxis: Sequential compression device Mendel Bloch)     Code Status: Level 1 - Full Code  Advance Directive and Living Will:      Power of :    POLST:      Counseling / Coordination of Care  I spent 45 minutes with the patient  PLEASE NOTE:  This encounter may have been completed utilizing the [employer] Direct Speech Voice Recognition Software  Grammatical errors, random word insertions, pronoun errors and incomplete sentences are occasional consequences of the system due to software limitations, ambient noise and hardware issues  These may be missed even after proof reading prior to affixing electronic signature  Please do not hesitate to contact me directly if you have any questions or concerns about the content, text or information contained within the body of this dictation

## 2022-05-13 NOTE — QUICK NOTE
Nursing informed patient fell around 2200 last night  I went and examined the patient after the fall  At the time of examination, he appeared to have left facial droop and left sided UE and LE weakness  He also had a transient word finding difficulty and word salad upon evaluation  Nursing reported his facial droop, and muscle weakness was worse than before  I ordered the CTh and showed small subarachnoid hemorrhage of right frontal and parietal lobe  I reevaluated the patient and NIH score was 5  I contacted neurology and neuro surgery  They recommended holding asa, and plavix and neuro check q2h and [facility] step [facility]  [facility]  Neurosurgery recommended repeat CTH in the AM  Contacted CC and it was decided that the patient to be [facility] to [facility]  I call patient's wife, Brittney Valdes, and gave her an update as above  All questions were answered

## 2022-05-13 NOTE — PLAN OF CARE
Problem: OCCUPATIONAL THERAPY ADULT  Goal: Performs self-care activities at highest level of function for planned discharge setting  See evaluation for individualized goals  Description: Treatment Interventions: ADL retraining,Visual perceptual retraining,Functional transfer training,Endurance training,Patient/family training,Equipment evaluation/education,Fine motor coordination activities,Neuromuscular reeducation,Continued evaluation,Energy conservation,Activityengagement  Equipment Recommended: Shower/Tub chair with back ($)       See flowsheet documentation for full assessment, interventions and recommendations  Outcome: Progressing  Note: Limitation: Decreased ADL status, Decreased UE strength, Decreased endurance, Decreased self-care trans, Decreased fine motor control, Visual deficit, Decreased high-level ADLs     Assessment: Pt seen for skilled OT tx session day 2 from [id number] focusing on activity engagement and continued evaluation  Rapid response called on 5/11/22 due to acute change in neuro status after using bathroom and pt s/p fall 5/12/22 when using bathroom w/ PCA, + head strike  Imaging indicates new R sided SAH  Neurosurgery consulted and recommend mobilize w/ PT/ OT and no surgical intervention anticipated  Pt w/ active OT orders and activity orders in ICU on 1:1  Pt required min A<> mod A to complete sit <> stand from recliner chair and min A using RW to complete functional transfer to commode  Pt engaged in functional mobility using RW w/ min A  Pt benefits from consistent cues for hand placement during sit <> stand  Pt engaged in grooming w/ min A seated OOB in chair  Pt required min A to complete UBD and max A to complete LBD  Pt demonstrated improved awareness of L UE and L side of body during ADLs  From an OT perspective, recommend post acute rehab when medically stable for discharge from acute care to return home   Will continue to follow     OT Discharge Recommendation: Post acute rehabilitation services

## 2022-05-13 NOTE — PHYSICAL THERAPY NOTE
PHYSICAL THERAPY TREATMENT NOTE    Patient Name: Allan Devlin  Today's Date: 5/13/2022 05/13/22 0854   PT Last Visit   PT Visit Date 05/13/22   Pain Assessment   Pain Assessment Tool 0-10   Pain Score No Pain   Restrictions/Precautions   Other Precautions Impulsive;1:1;Chair Alarm; Bed Alarm;Telemetry; Fall Risk;Visual impairment   General   Chart Reviewed Yes   Additional Pertinent History pt had a fall last evening and pt was transferred to ICU  Dipka BLACKWELL stated pt is appropriate for PT intervention and mobilization  Dr Patria Allen entered during PT session  Family/Caregiver Present Yes  (spouse and 1:1 were present during the session )   Cognition   Arousal/Participation Cooperative   Attention Attends with cues to redirect   Orientation Level Oriented to person; Other (Comment)  (pt was identified w/ full name, birth date)   Following Commands Follows one step commands with increased time or repetition   Subjective   Subjective pt seen supine in bed  agreed to participate in PT session  denied pain or dizziness  states feeling tired  occasional input was needed for task focus  pt noted to have improved awareness of left side from previous session  Bed Mobility   Supine to Sit 3  Moderate assistance   Additional items Assist x 1;HOB elevated; Bedrails; Increased time required;Verbal cues;LE management  (for trunk/LE positioning)   Transfers   Sit to Stand 3  Moderate assistance   Additional items Assist x 1; Increased time required;Verbal cues  (for hand placement, LE positioning)   Stand to Sit 3  Moderate assistance   Additional items Assist x 1; Impulsive;Verbal cues  (for body positioning, hand placement)   Additional Comments pt stood completing weight shifts and advance/retreat 3x bilaterally w/ roller walker and modx1 (w/ 2nd person present for safet and line management)  seated rest break  pt then ambulated as noted below  Ambulation/Elevation   Gait pattern Decreased L stance; Foward flexed; Short stride; Excessively slow   Gait Assistance 3  Moderate assist   Additional items Assist x 1;Verbal cues; Tactile cues  (for walker positioning, full step length, safety)   Assistive Device Rolling walker   Distance 5 feet  (additional not possible due to fatigue)   Stair Management Assistance Not tested  (due to limited ambulation tolerance, safety)   Balance   Static Sitting Fair  (intermittent left trunk lean, corrected w/ input)   Static Standing Poor +  (w /roller walker, intermittent left trunk lean)   Ambulatory Poor  (w/ roller walker)   Activity Tolerance   Activity Tolerance Patient limited by fatigue   Nurse Made Aware spoke to Lesa Zamudio PCA/1:1, [address] coordinator, Dr Al Woodward   Assessment   Prognosis Good   Problem List Decreased strength;Decreased endurance; Impaired balance;Decreased safety awareness; Obesity; Impaired vision; Impaired sensation;Decreased mobility; Decreased coordination   Assessment pt shows decline in activity tolerance since previous session w/ decreased ambulation tolerance  pt was noted to have improved left sided awareness from previous session  pt remains at risk for falling and continued inpatient PT to address fall risk and functional impairments  discharge recommendation is for inpatient rehab to maximize level of independence  pt's current goals and discharge recommendation remain appropriate  Goals   Patient Goals feel better  go home  STG Expiration Date 05/22/22   Short Term Goal #1 pt will:  Increase L LE strength 1/2 grade to facilitate independent mobility, Perform bed mobility w/ supervision to increase level of independence, Perform all transfers w/ supervision to improve independence, Ambulate 150 ft  with least restrictive assistive device w/ minx1 w/o LOB to improve functional independence, Navigate 3 stair(s) w/ modx1 with unilateral handrail to facilitate return to previous living environment, Increase ambulatory balance 1 grade to decrease risk for falls, Tolerate 3 hr OOB to faciliate upright tolerance, Improve gait speed to 0 38 m/s to reduce fall risk and increase independence and Improve Barthel Index score to 60 or greater to facilitate independence   PT Treatment Day 2   Plan   Treatment/Interventions Functional transfer training;LE strengthening/ROM; Elevations; Therapeutic exercise; Endurance training;Cognitive reorientation;Patient/family training;Equipment eval/education; Bed mobility;Gait training   Progress Slow progress, decreased activity tolerance   PT Frequency Other (Comment)  (5x/week)   Recommendation   PT Discharge Recommendation Post acute rehabilitation services   Additional Comments recommend roller walker use w/ mobility   AM-PAC Basic Mobility Inpatient   Turning in Bed Without Bedrails 3   Lying on Back to Sitting on Edge of Flat Bed 2   Moving Bed to Chair 2   Standing Up From Chair 2   Walk in Room 2   Climb 3-5 Stairs 1   Basic Mobility Inpatient Raw Score 12   Basic Mobility Standardized Score 32 23   Highest Level Of Mobility   -[facility] Goal 4: Move to chair/commode   -HL Achieved 5: Stand (1 or more minutes)   End of Consult   Patient Position at End of Consult Bedside chair;Bed/Chair alarm activated; All needs within reach; Other (comment)  (1:1 and pt's spouse present)     The patient's AM-PAC Basic Mobility Inpatient Short Form Raw Score is 12  A Raw score of less than or equal to 16 suggests the patient may benefit from discharge to post-acute rehabilitation services  Please also refer to the recommendation of the Physical Therapist for safe discharge planning  Skilled inpatient PT recommended while in hospital to progress pt toward treatment goals      Eliu Dick, PT

## 2022-05-13 NOTE — ASSESSMENT & PLAN NOTE
Per discussion with Dr Amanda Yañez, no nsx intervention anticipated  No follow up required by nsx  Ongoing management and follow up per neurology

## 2022-05-13 NOTE — OCCUPATIONAL THERAPY NOTE
Occupational Therapy Progress Note     Patient Name: Jeannie Grullon  Today's Date: 5/13/2022  Problem List  Principal Problem:    Ischemic stroke [facility])  Active Problems:    Hyperlipidemia    Hypertension, essential    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(HCC)    Urinary incontinence    Subarachnoid hemorrhage ([facility] [address] )       05/13/22 1422   OT Last Visit   OT Visit Date 05/13/22  (Friday)   Note Type   Note Type Treatment   Restrictions/Precautions   Weight Bearing Precautions Per Order No   Other Precautions 1:1;Impulsive;Cognitive; Chair Alarm; Bed Alarm;Multiple lines;Telemetry; Visual impairment;Pain; Fall Risk   General   Response to Previous Treatment Patient with no complaints from previous session   Family/Caregiver Present Yes, pt's wife, Hayden Tanner present during session   Pain Assessment   Pain Assessment Tool 0-10   Pain Score No Pain   ADL   Where Assessed Chair  (vs commode)   Eating Assistance 6  Modified independent   Eating Deficit Setup; Increased time to complete   Eating Comments able to feed self lunch seated OOB in chair w/ + time using R UE   Grooming Assistance 4  Minimal Assistance   Grooming Deficit Setup;Steadying;Verbal cueing; Increased time to complete   Grooming Comments engaged in oral hygiene seated OOB in chair  Able to stabilize, grossly grasp toothpaste to open w/ R UE    UB Bathing Assistance Unable to assess   LB Bathing Assistance Unable to assess   UB Dressing Assistance 4  Minimal Assistance   UB Dressing Deficit Thread LUE; Fasteners;Pull around back; Setup; Impulsive;Verbal cueing;Supervision/safety; Increased time to complete   LB Dressing Assistance 2  Maximal Assistance   LB Dressing Deficit Don/doff L sock; Don/doff R sock; Setup; Increased time to complete   Toileting Assistance  2  Maximal Assistance   Toileting Deficit Bedside commode;Clothing management down;Clothing management up;Perineal hygiene;Setup  (incontinence)   Toileting Comments Pt required min A to complete transfer using RW chair to commode  Pt required A for gown mgmt and max A for personal hygiene   Bed Mobility   Supine to Sit Unable to assess   Sit to Supine Unable to assess   Additional Comments Pt seated OOB in chair upon therapist arrival and end of session w/ needs met, call bell in reach and chair alarm activated  1:1 present  Transfers   Sit to Stand   (min A <> mod A)   Additional items Assist x 1; Armrests; Increased time required;Verbal cues  (cues for hand placement)   Stand to Sit   (min <> mod A)   Additional items Assist x 1; Increased time required;Verbal cues;Armrests; Bedrails  (cues for hand placement)   Toilet transfer 4  Minimal assistance   Additional items Assist x 1; Increased time required;Verbal cues; Commode   Additional Comments Pt performed sit <> stand 3 X during session w/ min A<> mod A  Cues for hand placement   Functional Mobility   Functional Mobility 4  Minimal assistance   Additional Comments engaged in short distance functional mobility using RW w/ min A  + time and cues to grasp walker w/ L UE   Additional items Rolling walker   Toilet Transfers   Toilet Transfer From Rolling walker   Toilet Transfer Type To and from   Toilet Transfer to Standard bedside commode   Toilet Transfer Technique Ambulating   Toilet Transfers Minimal assistance;Verbal cues   Neuromuscular Education   Comments Pt demonstrated L UE AROM AG WFL  Observed L UE drift w/ eyes closed  L UE grasp strength grossly 3/5  Limited finger opposition   Coordination   Gross Motor Pt reports R hand dominance and demonstrated decreased coordination L UE   Fine Motor Able to stabilize grooming supplies in L UE   Cognition   Overall Cognitive Status Impaired  (flat affect)   Arousal/Participation Alert; Cooperative   Attention Attends with cues to redirect   Orientation Level Oriented X4   Memory Decreased recall of recent events; Other (Comment)  (details, timeframe)   Following Commands Follows one step commands with increased time or repetition Comments Identified pt by full name and birthdate  Alert and generally oriented w/ flat affect  Observed facial droop and communicated w/ slurred, garbled speech at times  Difficulty enunciating  Vision   Vision Comments Improved attention / awareness L UE   Activity Tolerance   Activity Tolerance Patient limited by fatigue   Medical Staff Made Aware per RNZandra appropriate to see pt  Spoke to [facility]   Assessment   Assessment Pt seen for skilled OT tx session day 2 from [id number] focusing on activity engagement and continued evaluation  Rapid response called on 5/11/22 due to acute change in neuro status after using bathroom and pt s/p fall 5/12/22 when using bathroom w/ PCA, + head strike  Imaging indicates new R sided SAH  Neurosurgery consulted and recommend mobilize w/ PT/ OT and no surgical intervention anticipated  Pt w/ active OT orders and activity orders in ICU on 1:1  Pt required min A<> mod A to complete sit <> stand from recliner chair and min A using RW to complete functional transfer to commode  Pt engaged in functional mobility using RW w/ min A  Pt benefits from consistent cues for hand placement during sit <> stand  Pt engaged in grooming w/ min A seated OOB in chair  Pt required min A to complete UBD and max A to complete LBD  Pt demonstrated improved awareness of L UE and L side of body during ADLs  From an OT perspective, recommend post acute rehab when medically stable for discharge from acute care to return home  Will continue to follow   Plan   Treatment Interventions ADL retraining;Functional transfer training;UE strengthening/ROM; Endurance training;Cognitive reorientation;Patient/family training;Equipment evaluation/education; Neuromuscular reeducation; Fine motor coordination activities;Continued evaluation; Energy conservation; Activityengagement   Goal Expiration Date 05/16/22   OT Treatment Day 2  (Friday)   OT Frequency 2-3x/wk   Recommendation   OT Discharge Recommendation Post acute rehabilitation services   Equipment Recommended Bedside commode; Shower/Tub chair with back ($)   AM-PAC Daily Activity Inpatient   Lower Body Dressing 2   Bathing 2   Toileting 2   Upper Body Dressing 3   Grooming 3   Eating 3   Daily Activity Raw Score 15   Daily Activity Standardized Score (Calc for Raw Score >=11) 34 69   AM-PAC Applied Cognition Inpatient   Following a Speech/Presentation 3   Understanding Ordinary Conversation 4   Taking Medications 3   Remembering Where Things Are Placed or Put Away 3   Remembering List of 4-5 Errands 2   Taking Care of Complicated Tasks 2   Applied Cognition Raw Score 17   Applied Cognition Standardized Score 36 52   Barthel Index   Feeding 5   Bathing 0   Grooming Score 0   Dressing Score 5   Bladder Score 5   Bowels Score 5   Toilet Use Score 5   Transfers (Bed/Chair) Score 10   Mobility (Level Surface) Score 0   Stairs Score 0   Barthel Index Score 35   Modified Ct Scale   Modified [place] Scale 4   The patient's raw score on the AM-PAC Daily Activity inpatient short form is 15, standardized score is 34 69, less than 39 4  Patients at this level are likely to benefit from discharge to post-acute rehabilitation services  Please refer to the recommendation of the Occupational Therapist for safe discharge planning         Marylen Furl, OTR/L

## 2022-05-13 NOTE — ASSESSMENT & PLAN NOTE
· Blood pressure is currently stable  · Continue amlodipine and lisinopril   · Avoid hypo/hypertension

## 2022-05-13 NOTE — ASSESSMENT & PLAN NOTE
· Pt presented to the ED on 5/8/22 and found to have right MCA/ right ICA stroke  Initial NIHSS was 1      · Presented with complaints of transient dizziness, lightheadedness and 2 episodes of left-sided ataxia in which patient stumbled and fell  · Also reports urinary and bowel incontinence since the stroke  · Pt fell on 5/12/22 overnight and was found to have new right sided SAH  · Imaging reviewed personally and by attending  Final results as below  · [facility] 5/13/22: Persistent few patchy hyperdense hemorrhagic abnormalities are seen within the right frontal and parietal regions suggesting subarachnoid or gyral hemorrhage associated with grossly unchanged adjacent hypodense parenchymal abnormalities identified in this context of recent right MCA infarction status post occlusion of the right MCA and internal carotid artery  · CTA stroke alert the neck with without contrast  5/11/22: Persistent long segment occlusion of RIGHT ICA cervical segment (with scattered areas of faint sliver of contrast enhancement) with near-complete occlusion in petrous to proximal cavernous segments, which may be due to thrombosed dissection  Occluded left vertebral artery (origin to distal V1 segment) with distal reconstitution at the level of proximal V2 segment  Question retrograde flow through a patent [place]  · MRI brain wo 5/9/22: Evidence of 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  Recent right hemisphere internal /deep watershed infarcts as well as frontoparietotemporal cortical infarcts  Few tiny occipital infarcts may be embolic  Plan  · Continue regular neurologic checks  · Pain control and ongoing medical management per primary team  · Eval and mobilize per PT/OT  · DVT PPX: SCDs  · Neurology following, recommendations  · Pt was previously started on ASA/Plavix which was held due to the acute SAH  · Will consider resuming monotherapy     · Pt will obtain repeat MRI brain wo later today  · Pt had MRIs of the C/T/L spine ordered due to concern for urinary and bowel incontinence  · Per discussion with Dr Dorcas Judd  No neurosurgical intervention is anticipated  Ongoing management per neurology  · Neurosurgery will review MRIs when completed  Please call with any questions or concerns

## 2022-05-13 NOTE — ASSESSMENT & PLAN NOTE
Pt found to have right SAHs s/p fall in the setting of acute R  MCA stroke  Repeat CTH this am stable  Plan  · Continue frequent neurological checks  · STAT CT head with any neurological decline or a decrease in GCS of 2pts within 1 hr  · AC/AP management per neurology  · Mobilize and eval by PT/OT when able to  · DVT PPX: SCDs, hold pharm dvt ppx till cleared to restart by neurology  · Ongoing medical management per primary team  · No neurosurgical intervention is anticipated at this time

## 2022-05-14 ENCOUNTER — APPOINTMENT (INPATIENT)
Dept: MRI IMAGING | Facility: HOSPITAL | Age: 67
DRG: 064 | End: 2022-05-14
Payer: MEDICARE

## 2022-05-14 ENCOUNTER — APPOINTMENT (INPATIENT)
Dept: CT IMAGING | Facility: HOSPITAL | Age: 67
DRG: 064 | End: 2022-05-14
Payer: MEDICARE

## 2022-05-14 LAB
ANION GAP SERPL CALCULATED.3IONS-SCNC: 8 MMOL/L (ref 4–13)
BUN SERPL-MCNC: 21 MG/DL (ref 5–25)
CALCIUM SERPL-MCNC: 8.9 MG/DL (ref 8.4–10.2)
CHLORIDE SERPL-SCNC: 107 MMOL/L (ref 96–108)
CO2 SERPL-SCNC: 25 MMOL/L (ref 21–32)
CREAT SERPL-MCNC: 0.91 MG/DL (ref 0.6–1.3)
ERYTHROCYTE [DISTWIDTH] IN BLOOD BY AUTOMATED COUNT: 13.4 % (ref 11.6–15.1)
GFR SERPL CREATININE-BSD FRML MDRD: 86 ML/MIN/1.73SQ M
GLUCOSE SERPL-MCNC: 119 MG/DL (ref 65–140)
HCT VFR BLD AUTO: 48.8 % (ref 36.5–49.3)
HGB BLD-MCNC: 16.1 G/DL (ref 12–17)
MAGNESIUM SERPL-MCNC: 2 MG/DL (ref 1.9–2.7)
MCH RBC QN AUTO: 29.9 PG (ref 26.8–34.3)
MCHC RBC AUTO-ENTMCNC: 33 G/DL (ref 31.4–37.4)
MCV RBC AUTO: 91 FL (ref 82–98)
PHOSPHATE SERPL-MCNC: 3.5 MG/DL (ref 2.3–4.1)
PLATELET # BLD AUTO: 231 THOUSANDS/UL (ref 149–390)
PMV BLD AUTO: 10.4 FL (ref 8.9–12.7)
POTASSIUM SERPL-SCNC: 3.9 MMOL/L (ref 3.5–5.3)
RBC # BLD AUTO: 5.38 MILLION/UL (ref 3.88–5.62)
SODIUM SERPL-SCNC: 140 MMOL/L (ref 135–147)
WBC # BLD AUTO: 9.68 THOUSAND/UL (ref 4.31–10.16)

## 2022-05-14 PROCEDURE — 97530 THERAPEUTIC ACTIVITIES: CPT

## 2022-05-14 PROCEDURE — 99233 SBSQ HOSP IP/OBS HIGH 50: CPT | Performed by: NURSE PRACTITIONER

## 2022-05-14 PROCEDURE — 80048 BASIC METABOLIC PNL TOTAL CA: CPT | Performed by: INTERNAL MEDICINE

## 2022-05-14 PROCEDURE — 70551 MRI BRAIN STEM W/O DYE: CPT

## 2022-05-14 PROCEDURE — 93005 ELECTROCARDIOGRAM TRACING: CPT

## 2022-05-14 PROCEDURE — 85027 COMPLETE CBC AUTOMATED: CPT | Performed by: INTERNAL MEDICINE

## 2022-05-14 PROCEDURE — 84100 ASSAY OF PHOSPHORUS: CPT | Performed by: INTERNAL MEDICINE

## 2022-05-14 PROCEDURE — 83735 ASSAY OF MAGNESIUM: CPT | Performed by: INTERNAL MEDICINE

## 2022-05-14 PROCEDURE — G1004 CDSM NDSC: HCPCS

## 2022-05-14 PROCEDURE — 99232 SBSQ HOSP IP/OBS MODERATE 35: CPT | Performed by: PSYCHIATRY & NEUROLOGY

## 2022-05-14 PROCEDURE — 70450 CT HEAD/BRAIN W/O DYE: CPT

## 2022-05-14 PROCEDURE — 72141 MRI NECK SPINE W/O DYE: CPT

## 2022-05-14 PROCEDURE — 72146 MRI CHEST SPINE W/O DYE: CPT

## 2022-05-14 RX ORDER — CLOPIDOGREL BISULFATE 75 MG/1
75 TABLET ORAL DAILY
Status: DISCONTINUED | OUTPATIENT
Start: 2022-05-14 | End: 2022-05-15

## 2022-05-14 RX ADMIN — ASPIRIN 81 MG: 81 TABLET, COATED ORAL at 08:09

## 2022-05-14 RX ADMIN — FENOFIBRATE 48 MG: 48 TABLET, FILM COATED ORAL at 08:13

## 2022-05-14 RX ADMIN — ATORVASTATIN CALCIUM 40 MG: 40 TABLET, FILM COATED ORAL at 17:21

## 2022-05-14 RX ADMIN — Medication 3 MG: at 21:06

## 2022-05-14 RX ADMIN — AMLODIPINE BESYLATE 5 MG: 5 TABLET ORAL at 08:08

## 2022-05-14 RX ADMIN — LISINOPRIL 5 MG: 5 TABLET ORAL at 08:09

## 2022-05-14 RX ADMIN — CLOPIDOGREL BISULFATE 75 MG: 75 TABLET ORAL at 10:57

## 2022-05-14 NOTE — ASSESSMENT & PLAN NOTE
· Patient initially presented 5/9 with c/o transient dizziness, lightheadedness and 2 episodes of Left-sided ataxia  Reportedly on evaluation in the ER was found to have subtle deficits of left arm pronator drift and subtle confusion  · 5/9 CT head: No acute territorial infarct, hemorrhage, or midline shift  Asymmetric hyperattenuated appearance distal right M1/perisylvian branches of the right MCA suspicious for acute thrombus given patient symptomatology  · 5/9 MRI brain: Evidence of 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  · Recent right hemisphere internal /deep watershed infarcts as well as frontoparietotemporal cortical infarcts  Few tiny occipital infarcts may be embolic  · He was admitted to the hospitalist service with consult to neurology  · He was started on aspirin, plavix, atorvastatin, and DVT ppx with Lovenox  · 5/11 Rapid Response was called for change in mental status with suspected vasovagal event  · CT head: Persistent long segment occlusion of RIGHT ICA cervical segment (with scattered areas of faint sliver of contrast enhancement) with near-complete occlusion in petrous to proximal cavernous segments, which may be due to thrombosed dissection  Occluded left vertebral artery (origin to distal V1 segment) with distal reconstitution at the level of proximal V2 segment  Question retrograde flow through a patent [place]  · 5/12 around 22-23:00 patient fell with head-strike  CT head: Subarachnoid hemorrhages within the right frontal and parietal lobe  No midline shift or hydrocephalus    · Hold further AP/AC  · Continue Atorvastatin  · Avoid hypo/hypertension  · Continue frequent neuro exams  · PT/OT consult  · PMR consulted, appreciate recommendations

## 2022-05-14 NOTE — PLAN OF CARE
Problem: PHYSICAL THERAPY ADULT  Goal: Performs mobility at highest level of function for planned discharge setting  See evaluation for individualized goals  Description: Treatment/Interventions: Functional transfer training,LE strengthening/ROM,Elevations,Therapeutic exercise,Patient/family training,Endurance training,Equipment eval/education,Bed mobility,Gait training          See flowsheet documentation for full assessment, interventions and recommendations  Outcome: Progressing  Note: Prognosis: Good  Problem List: Decreased strength, Decreased endurance, Impaired balance, Decreased mobility, Decreased coordination, Decreased cognition, Decreased safety awareness, Impaired judgement, Impaired vision  Assessment: pt with continued difficulty aintaing midline with strong left sided lean, requirng sometimes max assit from PT for correction  PAtient does tolerates standing for greater than 2 minutes at a time during voiding, pericare  Patient does demonstrate little improvement in bed mobility this session  Requires cues for hand placement and particaption of bed mobility  Pt would continue to benefit from skilled PT services in the hospital and upon discharge  PT Discharge Recommendation: Post acute rehabilitation services          See flowsheet documentation for full assessment

## 2022-05-14 NOTE — ASSESSMENT & PLAN NOTE
· Patient fell with head-strike earlier this evening with no noted loss of consciousness    CT head was completed and noted Right frontal / parietal SAH  · Was initially being treated for R MCA CVA on ASA, Plavix, and Lovenox for DVT ppx  · Hold AC/AP for now  · Neurosurgery was consulted, appreciate recommendations  · Neurology following, appreciate recommendations  · Neuro checks q2h  · STAT CT head if > 2 points decrease in GCS  · Avoid hypotension, SBP > 160, hypoxia, and hypoglycemia  · Continue 1:1 sitter  · PT/OT consult

## 2022-05-14 NOTE — ASSESSMENT & PLAN NOTE
Urinary incontinence noted since stroke on Sunday, pt does not seem to be aware when this is occurring but does not have sensory loss or spinal level  UA negative      Discussed with neurosurgery AP and CC AP    MRI C/T/L spine pending  PVR ordered and pending

## 2022-05-14 NOTE — PHYSICAL THERAPY NOTE
Physical Therapy Treatment Note    Patient's Name: Fanny Martinez  : 22 1440   PT Last Visit   PT Visit Date 22   Note Type   Note Type Treatment   Pain Assessment   Pain Assessment Tool 0-10   Pain Score No Pain   Restrictions/Precautions   Weight Bearing Precautions Per Order No   Other Precautions Impulsive;Cognitive; Chair Alarm; Bed Alarm;Multiple lines;Telemetry; Fall Risk;Visual impairment   General   Chart Reviewed Yes   Response to Previous Treatment Patient unable to report, no changes reported from family or staff   Family/Caregiver Present Yes   Cognition   Comments pt ID by wrist band, name and    Subjective   Subjective pt wife states, patient has been sleeping all day after not being able to sleep the past few days   Bed Mobility   Supine to Sit 2  Maximal assistance   Additional items Assist x 1;HOB elevated; Increased time required; Impulsive;LE management  (trunk control)   Sit to Supine 2  Maximal assistance   Additional items Assist x 1;HOB elevated; Increased time required;Verbal cues   Additional Comments requires assist x2 to boost higher in bed at end of session   Transfers   Sit to Stand 3  Moderate assistance   Additional items Assist x 1; Armrests; Increased time required; Impulsive;Verbal cues  (VC for hand placement)   Stand to Sit 3  Moderate assistance   Additional items Assist x 1; Impulsive;Verbal cues   Additional Comments while standing, patient voids, unable to control start/stop, at this time patient was returned to seated EOB, gown changed, floor cleaned, patient does toelrate standing for approximately two minutes while continuing to void   demonstrates strong left sided lean with inability to correct without PT assit, requires max facilitation for correct left hand placement on RW when standing   Balance   Static Sitting Fair   Static Standing Poor +  (RW)   Activity Tolerance   Activity Tolerance Patient limited by fatigue   Nurse Made Aware ISMAEL valles   Assessment   Prognosis Good   Problem List Decreased strength;Decreased endurance; Impaired balance;Decreased mobility; Decreased coordination;Decreased cognition;Decreased safety awareness; Impaired judgement; Impaired vision   Assessment pt with continued difficulty aintaing midline with strong left sided lean, requirng sometimes max assit from PT for correction  PAtient does tolerates standing for greater than 2 minutes at a time during voiding, pericare  Patient does demonstrate little improvement in bed mobility this session  Requires cues for hand placement and particaption of bed mobility  Pt would continue to benefit from skilled PT services in the hospital and upon discharge  Goals   Patient Goals to go home   STG Expiration Date 05/22/22   PT Treatment Day 3   Plan   Treatment/Interventions Functional transfer training;LE strengthening/ROM; Therapeutic exercise; Endurance training;Cognitive reorientation;Patient/family training;Equipment eval/education; Bed mobility;Gait training;Spoke to nursing   Progress Slow progress, decreased activity tolerance   PT Frequency Other (Comment)  (5x week)   Recommendation   PT Discharge Recommendation Post acute rehabilitation services   Additional Comments RW w/ mobilization   AM-PAC Basic Mobility Inpatient   Turning in Bed Without Bedrails 3   Lying on Back to Sitting on Edge of Flat Bed 2   Moving Bed to Chair 2   Standing Up From Chair 2   Walk in Room 2   Climb 3-5 Stairs 1   Basic Mobility Inpatient Raw Score 12   Basic Mobility Standardized Score 32 23   Highest Level Of Mobility   -HLM Goal 4: Move to chair/commode   -HLM Achieved 5: Stand (1 or more minutes)   Education   Education Provided Mobility training; Other  (continued particaption with skilled PT services)   Patient Demonstrates acceptance/verbal understanding   End of Consult   Patient Position at End of Consult Supine;Bed/Chair alarm activated; All needs within reach; Other (comment)  (ISMAEL valles in room)         Jenni Craven, PT, DPT

## 2022-05-14 NOTE — ARC ADMISSION
Upon review of patients case with [facility] physician , patient is deemed ARC appropriate at this time pending medical readiness / bed availability  CM made aware

## 2022-05-14 NOTE — PROGRESS NOTES
[facility]  Progress Note - [facility]  1955, 74 y o  male MRN: [id number]  Unit/Bed#: ICU 04 Encounter: [id number]  Primary Care Provider: Selena William MD   Date and time admitted to hospital: 5/8/2022 10:24 PM    Subarachnoid hemorrhage [facility])  Assessment & Plan  · Patient fell with head-strike earlier this evening with no noted loss of consciousness  CT head was completed and noted Right frontal / parietal SAH  · Was initially being treated for R MCA CVA on ASA, Plavix, and Lovenox for DVT ppx  · Hold AC/AP for now  · Neurosurgery was consulted, appreciate recommendations  · Neurology following, appreciate recommendations  · Neuro checks q2h  · STAT CT head if > 2 points decrease in GCS  · Avoid hypotension, SBP > 160, hypoxia, and hypoglycemia  · Continue 1:1 sitter  · PT/OT consult    * Ischemic stroke [facility])  Assessment & Plan  · Patient initially presented 5/9 with c/o transient dizziness, lightheadedness and 2 episodes of Left-sided ataxia  Reportedly on evaluation in the ER was found to have subtle deficits of left arm pronator drift and subtle confusion  · 5/9 CT head: No acute territorial infarct, hemorrhage, or midline shift  Asymmetric hyperattenuated appearance distal right M1/perisylvian branches of the right MCA suspicious for acute thrombus given patient symptomatology  · 5/9 MRI brain: Evidence of 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  · Recent right hemisphere internal /deep watershed infarcts as well as frontoparietotemporal cortical infarcts  Few tiny occipital infarcts may be embolic  · He was admitted to the hospitalist service with consult to neurology  · He was started on aspirin, plavix, atorvastatin, and DVT ppx with Lovenox  · 5/11 Rapid Response was called for change in mental status with suspected vasovagal event      · CT head: Persistent long segment occlusion of RIGHT ICA cervical segment (with scattered areas of faint sliver of contrast enhancement) with near-complete occlusion in petrous to proximal cavernous segments, which may be due to thrombosed dissection  Occluded left vertebral artery (origin to distal V1 segment) with distal reconstitution at the level of proximal V2 segment  Question retrograde flow through a patent [place]  · 5/12 around 22-23:00 patient fell with head-strike  CT head: Subarachnoid hemorrhages within the right frontal and parietal lobe  No midline shift or hydrocephalus  · Hold further AP/AC  · Continue Atorvastatin  · Avoid hypo/hypertension  · Continue frequent neuro exams  · PT/OT consult  · PMR consulted, appreciate recommendations      Urinary incontinence  Assessment & Plan  · Urinary retention protocol  · Frequent voiding attempts    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[facility])  Assessment & Plan  · 5/9 MRI carotids: Occluded right cervical and visualized intracranial internal carotid artery  Poor visualization of the left vertebral artery suspected developmental nondominant rather than occlusion or stenosis  · 5/11 CTA head and neck: Persistent long segment occlusion of RIGHT ICA cervical segment (with scattered areas of faint sliver of contrast enhancement) with near-complete occlusion in petrous to proximal cavernous segments, which may be due to thrombosed dissection  Occluded left vertebral artery (origin to distal V1 segment) with distal reconstitution at the level of proximal V2 segment  Question retrograde flow through a patent [place]    · Neurosurgery consulted, appreciate recommendations  · Holding AC/AP in the setting of new SAH  · Neuro checks as above  · Avoid hypotension/hypertension    Hypertension, essential  Assessment & Plan  · Blood pressure is currently stable  · Continue amlodipine and lisinopril   · Avoid hypo/hypertension    Hyperlipidemia  Assessment & Plan  · Continue atorvastatin and fenofibrate      ----------------------------------------------------------------------------------------  HPI/24hr events: No acute events overnight  Patient appropriate for transfer out of the ICU today?: No  Disposition: Continue Stepdown Level 1 level of care   Code Status: Level 1 - Full Code  ---------------------------------------------------------------------------------------  SUBJECTIVE: Pt reports no complaints, slept well  Review of Systems   Constitutional: Negative  HENT: Negative  Respiratory: Negative  Cardiovascular: Negative  Gastrointestinal: Negative  Genitourinary: Negative  Musculoskeletal: Negative  Skin: Negative  Neurological: Negative  Psychiatric/Behavioral: Negative  Review of systems was reviewed and negative unless stated above in HPI/24-hour events   ---------------------------------------------------------------------------------------  OBJECTIVE    Vitals   Vitals:    22 1900 22 2258 22 0239 22 0319   BP: 145/76 129/58 131/71 137/65   BP Location: Left arm Left arm Left arm Left arm   Pulse: 76 71 60 63   Resp: 18 18 17 21   Temp: 98 2 °F (36 8 °C) 98 4 °F (36 9 °C) 98 6 °F (37 °C) 97 9 °F (36 6 °C)   TempSrc: Oral Oral Oral Oral   SpO2: 92% 100% 98% 97%   Weight:   115 kg (252 lb 6 8 oz)    Height:         Temp (24hrs), Av °F (36 7 °C), Min:97 3 °F (36 3 °C), Max:98 6 °F (37 °C)  Current: Temperature: 97 9 °F (36 6 °C)    Respiratory:  SpO2: SpO2: 97 %  Nasal Cannula O2 Flow Rate (L/min): 3 L/min    Invasive/non-invasive ventilation settings   Respiratory  Report   Lab Data (Last 4 hours)    None         O2/Vent Data (Last 4 hours)    None              Physical Exam  Vitals and nursing note reviewed  Constitutional:       General: He is awake  He is not in acute distress  HENT:      Head: Normocephalic and atraumatic  Mouth/Throat:      Mouth: Mucous membranes are moist       Pharynx: Oropharynx is clear  Eyes:      Conjunctiva/sclera: Conjunctivae normal       Pupils: Pupils are equal, round, and reactive to light  Cardiovascular:      Rate and Rhythm: Normal rate and regular rhythm  Pulmonary:      Effort: Pulmonary effort is normal       Breath sounds: Normal breath sounds  Abdominal:      General: There is no distension  Palpations: Abdomen is soft  Tenderness: There is no abdominal tenderness  Musculoskeletal:         General: No swelling, deformity or signs of injury  Cervical back: Neck supple  Right lower leg: No edema  Left lower leg: No edema  Skin:     General: Skin is warm and dry  Neurological:      Mental Status: He is alert  Motor: Weakness present        Comments: L facial droop  LUE weakness          Laboratory and Diagnostics:  Results from last 7 days   Lab Units 05/13/22  0423 05/12/22  0738 05/11/22  1642 05/10/22  0342 05/09/22  0424 05/08/22  2244   WBC Thousand/uL 10 13 9 40 13 54* 9 22 8 99 8 55   HEMOGLOBIN g/dL 15 7 15 7 17 2* 15 6 15 3 15 7   HEMATOCRIT % 46 9 46 0 50 4* 46 2 46 6 46 4   PLATELETS Thousands/uL 248 228 302 218 245 248   NEUTROS PCT % 75 73 68  --  76*  --    MONOS PCT % 9 9 10  --  7  --      Results from last 7 days   Lab Units 05/13/22  0423 05/11/22  1645 05/10/22  0342 05/09/22  0424 05/08/22  2244   SODIUM mmol/L 140 136 139 140 139   POTASSIUM mmol/L 3 9 4 0 3 8 3 9 4 3   CHLORIDE mmol/L 109* 103 107 109* 108   CO2 mmol/L 23 22 23 24 23   ANION GAP mmol/L 8 11 9 7 8   BUN mg/dL 20 17 12 12 14   CREATININE mg/dL 0 92 1 03 0 86 0 87 0 93   CALCIUM mg/dL 9 0 9 6 8 6 9 3 9 3   GLUCOSE RANDOM mg/dL 131 131 114 134 198*     Results from last 7 days   Lab Units 05/13/22  0423   MAGNESIUM mg/dL 2 0   PHOSPHORUS mg/dL 3 2      Results from last 7 days   Lab Units 05/13/22  0423 05/08/22  2244   INR  0 99 0 87   PTT seconds  --  32          Results from last 7 days   Lab Units 05/11/22  1642   LACTIC ACID mmol/L 2 0     EKG: Telemetry reviewed  Imaging: I have personally reviewed pertinent reports  and I have personally reviewed pertinent films in PACS    Intake and Output  I/O       05/12 0701 05/13 0700 05/13 0701 05/14 0700    P  O  600 480    Total Intake(mL/kg) 600 (5 5) 480 (4 2)    Urine (mL/kg/hr) 200 (0 1) 950 (0 3)    Stool  0    Total Output 200 950    Net +400 -470          Unmeasured Urine Occurrence 4 x     Unmeasured Stool Occurrence  1 x        Height and Weights   Height: 6' (182 9 cm)  IBW (Ideal Body Weight): 77 6 kg  Body mass index is 34 24 kg/m²  Weight (last 2 days)     Date/Time Weight    05/14/22 0239 115 (252 43)    05/13/22 0547 110 (242 73)    05/13/22 0359 110 (242 73)    05/13/22 0319 110 (242 51)        Nutrition       Diet Orders   (From admission, onward)             Start     Ordered    05/13/22 0912  Diet Regular; Regular House  Diet effective now        References:    Nutrtion Support Algorithm Enteral vs  Parenteral   Question Answer Comment   Diet Type Regular    Regular Regular House    RD to adjust diet per protocol?  Yes        05/13/22 0912              Active Medications  Scheduled Meds:  Current Facility-Administered Medications   Medication Dose Route Frequency Provider Last Rate    amLODIPine  5 mg Oral Daily CARLOS ALBERTO Rocha      aspirin  81 mg Oral Daily [address] [place], [place]      atorvastatin  40 mg Oral QPM Gretna Mari Bottineau, CRNP      fenofibrate  48 mg Oral Daily Gretna Mari Bottineau, [address]      hydrALAZINE  10 mg Intravenous Q6H PRN Gunner Martinez MD      lisinopril  5 mg Oral Daily Gretna Mari Bottineau, [address]      melatonin  3 mg Oral HS Gretna Mari Bottineau, CRNP       PRN Meds:   hydrALAZINE, 10 mg, Q6H PRN      Invasive Devices Review  Invasive Devices  Report    Peripheral Intravenous Line  Duration           Peripheral IV 05/12/22 Right;Ventral (anterior) Wrist 1 day    Peripheral IV 05/13/22 Left;Ventral (anterior) Forearm <1 day ---------------------------------------------------------------------------------------  Care Time Delivered:   No Critical Care time spent     Jimy PERI Zuniga    Portions of the record may have been created with voice recognition software  Occasional wrong word or "sound a like" substitutions may have occurred due to the inherent limitations of voice recognition software    Read the chart carefully and recognize, using context, where substitutions have occurred

## 2022-05-14 NOTE — PROGRESS NOTES
NEUROLOGY RESIDENCY PROGRESS NOTE     Name: Anju Pina  Age & Sex: 74 y o  male   MRN: [id number]  Unit/Bed#: ICU 04   Encounter: [id number]    ASSESSMENT & PLAN     * Ischemic stroke [facility])  Assessment & Plan  Stroke alert on 5/8/2022 10:24 PM with initial NIHSS of 1 and LKW 1900, initial Blood Pressure: 169/80  Initial presenting deficits were left upper extremity weakness  Inpatient stroke alert on 5/11 due to left facial droop and LUE   RRT on 5/12 for mechanical fall CTH + for small right parietal SAH  As a result of low score pt was determined to not be a candidate for tPA   Exam on 05/14/22: LUE drift, L facial droop - unchanged   Current Blood Pressure: 118/56, BP over 24 hours: BP  Min: 113/63  Max: 145/76    Home meds: no AP/AC at home    Workup:  Lab Results   Component Value Date    HGBA1C 5 9 (H) 05/08/2022    CHOLESTEROL 206 (H) 05/08/2022    LDLCALC 141 (H) 05/08/2022    TRIG 163 (H) 05/08/2022    INR 0 99 05/13/2022       CTH: no acute findings   CTA: did not receive due to contrast shortage during initial stroke alert   CTA 5/11 during inpatient stroke alert: LORI occlusion with evidence of dissection and L vertebral occlusion   MRI: R hemispheric infarcts watershed territory, MRA head and neck showing R ICA and R MCA occlusions and L vertebral artery stenosis/occlusion      Echocardiogram: LVEF 13%, grad 1 diastolic dysfunction, no LA dilation   o LUCY: LVEF 65% no PFO no atrial thrombus   Telemetry: no events    Pertinent scores:  - NIHSS: 2  Stroke Modified [place] Score: 0 (No baseline symptoms/disability)    Impression: numerous vessel occlusions and 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 discussed with neurosurgery, on DAPT after small traumatic SAH noted to be stable on imaging    Plan:  - Stroke pathway  - Discussed plan with neurology attending, Dr Lyssa Vasquez  - BP: <140 due to dissection of ICA  - atorvastatin 40mg qhs  - Maintain glucose <180, SSI for coverage if indicated  - Medical management as per primary team appreciated  - Antiplatelet agents: DAPT with ASA and Plavix as CTH was stable as of   - DVT ppx and SCDs  - Monitor on telemetry  - PT/OT/Speech/PM&R input appreciated     Urinary incontinence  Assessment & Plan  Urinary incontinence noted since stroke on , pt does not seem to be aware when this is occurring but does not have sensory loss or spinal level  UA negative  Discussed with neurosurgery AP and CC AP    MRI C/T/L spine pending  PVR ordered and pending        Albertina Decker  will need follow up in in 4 weeks with neurovascular attending  He will not require outpatient neurological testing  SUBJECTIVE     Patient was seen and examined  No acute events overnight  Changes to symptoms: none, stable  Medication changes: DAPT added back due to stability and resolving SAH  PT/OT/rehab recs: post acute  Mood and sleep: sleep is poor as pt is in ICU but otherwise no concerned  [facility]  stable  No new labs  Stable VS    10 point review of systems conducted and otherwise negative other than as documented above    OBJECTIVE     Patient ID: Albertina Decker  is a 74 y o  male  Vitals:    22 0319 22 0603 22 0754 22 1100   BP: 137/65 142/64 118/56 122/76   BP Location: Left arm Left arm Left arm Left arm   Pulse: 63 (!) 52 58    Resp: 21 12 13    Temp: 97 9 °F (36 6 °C) 97 7 °F (36 5 °C)  97 8 °F (36 6 °C)   TempSrc: Oral Oral  Oral   SpO2: 97% 99% 99%    Weight:       Height:          Temperature:   Temp (24hrs), Av 1 °F (36 7 °C), Min:97 7 °F (36 5 °C), Max:98 6 °F (37 °C)    Temperature: 97 8 °F (36 6 °C)    Neurologic Exam     Mental Status   Oriented to person, place, and time  Cranial Nerves     CN III, IV, VI   Pupils are equal, round, and reactive to light  Extraocular motions are normal      CN V   Facial sensation intact       CN VII   Left facial weakness: central    Motor Exam   Muscle bulk: normal  Overall muscle tone: increased  Right arm pronator drift: absent  Left arm pronator drift: present    Strength   Strength 5/5 except as noted  LUE proximal is 4+/5     Gait, Coordination, and Reflexes     Coordination   Finger to nose coordination: normal         LABORATORY DATA     Labs: I have personally reviewed pertinent reports  Results from last 7 days   Lab Units 05/14/22 0429 05/13/22  0423 05/12/22  0738 05/11/22  1642   WBC Thousand/uL 9 68 10 13 9 40 13 54*   HEMOGLOBIN g/dL 16 1 15 7 15 7 17 2*   HEMATOCRIT % 48 8 46 9 46 0 50 4*   PLATELETS Thousands/uL 231 248 228 302   NEUTROS PCT %  --  75 73 68   MONOS PCT %  --  9 9 10      Results from last 7 days   Lab Units 05/14/22  0429 05/13/22  0423 05/11/22  1645   POTASSIUM mmol/L 3 9 3 9 4 0   CHLORIDE mmol/L 107 109* 103   CO2 mmol/L 25 23 22   BUN mg/dL 21 20 17   CREATININE mg/dL 0 91 0 92 1 03   CALCIUM mg/dL 8 9 9 0 9 6     Results from last 7 days   Lab Units 05/14/22  0429 05/13/22  0423   MAGNESIUM mg/dL 2 0 2 0     Results from last 7 days   Lab Units 05/14/22  0429 05/13/22  0423   PHOSPHORUS mg/dL 3 5 3 2      Results from last 7 days   Lab Units 05/13/22  0423 05/08/22  2244   INR  0 99 0 87   PTT seconds  --  32     Results from last 7 days   Lab Units 05/11/22  1642   LACTIC ACID mmol/L 2 0           IMAGING & DIAGNOSTIC TESTING     Radiology Results: I have personally reviewed pertinent films in PACS      Other Diagnostic Testing: I have personally reviewed pertinent reports        ACTIVE MEDICATIONS     Current Facility-Administered Medications   Medication Dose Route Frequency    amLODIPine (NORVASC) tablet 5 mg  5 mg Oral Daily    aspirin (ECOTRIN LOW STRENGTH) EC tablet 81 mg  81 mg Oral Daily    atorvastatin (LIPITOR) tablet 40 mg  40 mg Oral QPM    clopidogrel (PLAVIX) tablet 75 mg  75 mg Oral Daily    fenofibrate (TRICOR) tablet 48 mg  48 mg Oral Daily    hydrALAZINE (APRESOLINE) injection 10 mg  10 mg Intravenous Q6H PRN    lisinopril (ZESTRIL) tablet 5 mg  5 mg Oral Daily    melatonin tablet 3 mg  3 mg Oral HS       Prior to Admission medications    Medication Sig Start Date End Date Taking? Authorizing Provider   amLODIPine (NORVASC) 5 mg tablet Take 1 tablet (5 mg total) by mouth daily 9/22/21  Yes Marvel Durán MD   aspirin 81 mg chewable tablet Chew 1 tablet (81 mg total)  in the morning  5/12/22 8/10/22 Yes Tatiana Zeng MD   atorvastatin (LIPITOR) 40 mg tablet Take 1 tablet (40 mg total) by mouth every evening 5/11/22 8/9/22 Yes Tatiana Zeng MD   clopidogrel (PLAVIX) 75 mg tablet Take 1 tablet (75 mg total) by mouth in the morning  5/12/22 8/10/22 Yes Tatiana Zeng MD   fenofibrate (TRICOR) 48 mg tablet Take 1 tablet (48 mg total) by mouth daily 3/2/22 2/25/23 Yes Marvel Durán MD   lisinopril (ZESTRIL) 5 mg tablet Take 1 tablet (5 mg total) by mouth in the morning   5/12/22 6/11/22 Yes Tatiana Zeng MD       ==  MD Jose Ramos Neurology Residency, PGY-3

## 2022-05-14 NOTE — ASSESSMENT & PLAN NOTE
· 5/9 MRI carotids: Occluded right cervical and visualized intracranial internal carotid artery  Poor visualization of the left vertebral artery suspected developmental nondominant rather than occlusion or stenosis  · 5/11 CTA head and neck: Persistent long segment occlusion of RIGHT ICA cervical segment (with scattered areas of faint sliver of contrast enhancement) with near-complete occlusion in petrous to proximal cavernous segments, which may be due to thrombosed dissection  Occluded left vertebral artery (origin to distal V1 segment) with distal reconstitution at the level of proximal V2 segment  Question retrograde flow through a patent [place]    · Neurosurgery consulted, appreciate recommendations  · Holding AC/AP in the setting of new SAH  · Neuro checks as above  · Avoid hypotension/hypertension

## 2022-05-15 PROCEDURE — 99233 SBSQ HOSP IP/OBS HIGH 50: CPT | Performed by: NURSE PRACTITIONER

## 2022-05-15 PROCEDURE — 87040 BLOOD CULTURE FOR BACTERIA: CPT | Performed by: PHYSICIAN ASSISTANT

## 2022-05-15 PROCEDURE — 99232 SBSQ HOSP IP/OBS MODERATE 35: CPT | Performed by: PSYCHIATRY & NEUROLOGY

## 2022-05-15 RX ORDER — ASPIRIN 81 MG/1
81 TABLET ORAL DAILY
Status: DISCONTINUED | OUTPATIENT
Start: 2022-05-15 | End: 2022-05-17 | Stop reason: HOSPADM

## 2022-05-15 RX ADMIN — ATORVASTATIN CALCIUM 40 MG: 40 TABLET, FILM COATED ORAL at 17:35

## 2022-05-15 RX ADMIN — Medication 3 MG: at 22:03

## 2022-05-15 RX ADMIN — FENOFIBRATE 48 MG: 48 TABLET, FILM COATED ORAL at 09:50

## 2022-05-15 RX ADMIN — AMLODIPINE BESYLATE 5 MG: 5 TABLET ORAL at 09:50

## 2022-05-15 RX ADMIN — LISINOPRIL 5 MG: 5 TABLET ORAL at 09:50

## 2022-05-15 RX ADMIN — ASPIRIN 81 MG: 81 TABLET, COATED ORAL at 13:14

## 2022-05-15 NOTE — PROGRESS NOTES
[facility]  Progress Note - Allan Devlin  1955, 74 y o  male MRN: [id number]  Unit/Bed#: ICU 04 Encounter: [id number]  Primary Care Provider: Julio César Meyer MD   Date and time admitted to hospital: 5/8/2022 10:24 PM    Subarachnoid hemorrhage [facility])  Assessment & Plan  · Patient fell with head-strike earlier this evening with no noted loss of consciousness  CT head was completed and noted Right frontal / parietal SAH  · Was initially being treated for R MCA CVA on ASA, Plavix, and Lovenox for DVT ppx - however will continue to hold for now given continued worsening hemorrhagic conversion  · Neurosurgery was consulted, appreciate recommendations  · Neurology following, appreciate recommendations  · Avoid hypotension, SBP > 140, hypoxia, and hypoglycemia  · PT/OT consult  · Will check blood cultures to ensure no infectious etiology     * Ischemic stroke [facility])  Assessment & Plan  · Patient initially presented 5/9 with c/o transient dizziness, lightheadedness and 2 episodes of Left-sided ataxia  Reportedly on evaluation in the ER was found to have subtle deficits of left arm pronator drift and subtle confusion  · 5/9 CT head: No acute territorial infarct, hemorrhage, or midline shift  Asymmetric hyperattenuated appearance distal right M1/perisylvian branches of the right MCA suspicious for acute thrombus given patient symptomatology  · 5/9 MRI brain: Evidence of 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  · 5/11 Rapid Response was called for change in mental status with suspected vasovagal event  · CT head: Persistent long segment occlusion of RIGHT ICA cervical segment (with scattered areas of faint sliver of contrast enhancement) with near-complete occlusion in petrous to proximal cavernous segments, which may be due to thrombosed dissection    Occluded left vertebral artery (origin to distal V1 segment) with distal reconstitution at the level of proximal V2 segment  Question retrograde flow through a patent [place]  · 5/12 around 22-23:00 patient fell with head-strike  CT head: Subarachnoid hemorrhages within the right frontal and parietal lobe  No midline shift or hydrocephalus  · 5/14 MRI Brain: Redemonstrated findings of late acute to early subacute right MCA and ICA watershed zones of cerebral infarction  New focal cortical hemorrhage in the R frontal lobe and minimal petechial hemorrhage in the right insula  Additional small areas of scattered products suspected within the right cerebral sulci may represent a combination of thromboembolism and/or SAH  · Hold further AP/AC, DVT ppx given continued worsening of bleeds   · Continue Atorvastatin  · Avoid hypo/hypertension - goal systolic <650  · PT/OT consult    Urinary incontinence  Assessment & Plan  · Urinary retention protocol  · Frequent voiding attempts    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[facility])  Assessment & Plan  · 5/9 MRI carotids: Occluded right cervical and visualized intracranial internal carotid artery  Poor visualization of the left vertebral artery suspected developmental nondominant rather than occlusion or stenosis  · 5/11 CTA head and neck: Persistent long segment occlusion of RIGHT ICA cervical segment (with scattered areas of faint sliver of contrast enhancement) with near-complete occlusion in petrous to proximal cavernous segments, which may be due to thrombosed dissection  Occluded left vertebral artery (origin to distal V1 segment) with distal reconstitution at the level of proximal V2 segment  Question retrograde flow through a patent [place]  · 5/14 MRI Brain: Redemonstrated findings of late acute to early subacute right MCA and ICA watershed zones of cerebral infarction  New focal cortical hemorrhage in the R frontal lobe and minimal petechial hemorrhage in the right insula   Additional small areas of scattered products suspected within the right cerebral sulci may represent a combination of thromboembolism and/or SAH  · Neurosurgery consulted, appreciate recommendations  · Holding AC/AP, and DVT ppx in the setting of new SAH  · Neuro checks as above    Hypertension, essential  Assessment & Plan  · Blood pressure is currently stable - goal SBP <140  · Continue amlodipine and lisinopril   · Avoid hypo/hypertension    Hyperlipidemia  Assessment & Plan  · Continue atorvastatin and fenofibrate      ----------------------------------------------------------------------------------------  HPI/24hr events: No acute events overnight  MRI completed yesterday with new hemorrhage in the frontal lobe, petechial hemorrhages and concern for possible worsening watershed infarcts vs SAH  Patient appropriate for transfer out of the ICU today?: No  Disposition: Continue Stepdown Level 1 level of care   Code Status: Level 1 - Full Code  ---------------------------------------------------------------------------------------  SUBJECTIVE: No complaints  Review of Systems   Constitutional: Negative  HENT: Negative  Respiratory: Negative  Cardiovascular: Negative  Gastrointestinal: Negative  Genitourinary: Negative  Musculoskeletal: Negative  Skin: Negative  Neurological: Negative  Psychiatric/Behavioral: Negative        Review of systems was reviewed and negative unless stated above in HPI/24-hour events   ---------------------------------------------------------------------------------------  OBJECTIVE    Vitals   Vitals:    22 1920 22 2300 05/15/22 0300 05/15/22 0314   BP: 127/63 105/63 125/69    BP Location:  Left arm Right arm    Pulse: 73 61 66    Resp: 17 16 18    Temp: 98 5 °F (36 9 °C) 98 9 °F (37 2 °C) 97 8 °F (36 6 °C)    TempSrc:  Oral Oral    SpO2: 95% 92% 95%    Weight:    110 kg (242 lb 11 6 oz)   Height:         Temp (24hrs), Av 1 °F (36 7 °C), Min:97 7 °F (36 5 °C), Max:98 9 °F (37 2 °C)  Current: Temperature: 97 8 °F (36 6 °C)    Respiratory:  SpO2: SpO2: 95 %  Nasal Cannula O2 Flow Rate (L/min): 3 L/min    Invasive/non-invasive ventilation settings   Respiratory  Report   Lab Data (Last 4 hours)    None         O2/Vent Data (Last 4 hours)    None              Physical Exam  Vitals and nursing note reviewed  Constitutional:       General: He is sleeping  He is not in acute distress  HENT:      Head: Normocephalic and atraumatic  Mouth/Throat:      Mouth: Mucous membranes are moist       Pharynx: Oropharynx is clear  Eyes:      Conjunctiva/sclera: Conjunctivae normal       Pupils: Pupils are equal, round, and reactive to light  Cardiovascular:      Rate and Rhythm: Normal rate and regular rhythm  Pulmonary:      Effort: Pulmonary effort is normal       Breath sounds: Normal breath sounds  Abdominal:      General: There is no distension  Palpations: Abdomen is soft  Tenderness: There is no abdominal tenderness  Musculoskeletal:         General: No swelling, deformity or signs of injury  Cervical back: Neck supple  Right lower leg: No edema  Left lower leg: No edema  Skin:     General: Skin is warm and dry  Neurological:      Mental Status: He is easily aroused  Motor: Weakness present        Comments: LUE strength 2/5, left facial droop, mild aphasia   LLE strength 4/5     Laboratory and Diagnostics:  Results from last 7 days   Lab Units 05/14/22 0429 05/13/22 0423 05/12/22  0738 05/11/22  1642 05/10/22  0342 05/09/22  0424 05/08/22  2244   WBC Thousand/uL 9 68 10 13 9 40 13 54* 9 22 8 99 8 55   HEMOGLOBIN g/dL 16 1 15 7 15 7 17 2* 15 6 15 3 15 7   HEMATOCRIT % 48 8 46 9 46 0 50 4* 46 2 46 6 46 4   PLATELETS Thousands/uL 231 248 228 302 218 245 248   NEUTROS PCT %  --  75 73 68  --  76*  --    MONOS PCT %  --  9 9 10  --  7  --      Results from last 7 days   Lab Units 05/14/22 0429 05/13/22 0423 05/11/22  1645 05/10/22  0342 05/09/22  0424 05/08/22  2244   SODIUM mmol/L 140 140 136 139 140 139   POTASSIUM mmol/L 3 9 3 9 4 0 3 8 3 9 4 3   CHLORIDE mmol/L 107 109* 103 107 109* 108   CO2 mmol/L 25 23 22 23 24 23   ANION GAP mmol/L 8 8 11 9 7 8   BUN mg/dL 21 20 17 12 12 14   CREATININE mg/dL 0 91 0 92 1 03 0 86 0 87 0 93   CALCIUM mg/dL 8 9 9 0 9 6 8 6 9 3 9 3   GLUCOSE RANDOM mg/dL 119 131 131 114 134 198*     Results from last 7 days   Lab Units 05/14/22  0429 05/13/22  0423   MAGNESIUM mg/dL 2 0 2 0   PHOSPHORUS mg/dL 3 5 3 2      Results from last 7 days   Lab Units 05/13/22  0423 05/08/22  2244   INR  0 99 0 87   PTT seconds  --  32          Results from last 7 days   Lab Units 05/11/22  1642   LACTIC ACID mmol/L 2 0     EKG: Telemetry reviewed  Imaging: I have personally reviewed pertinent reports  and I have personally reviewed pertinent films in PACS    Intake and Output  I/O       05/13 0701  05/14 0700 05/14 0701  05/15 0700    P  O  480     Total Intake(mL/kg) 480 (4 2)     Urine (mL/kg/hr) 950 (0 3) 200 (0 1)    Stool 0     Total Output 950 200    Net -470 -200          Unmeasured Stool Occurrence 1 x         Height and Weights   Height: 6' (182 9 cm)  IBW (Ideal Body Weight): 77 6 kg  Body mass index is 32 92 kg/m²  Weight (last 2 days)     Date/Time Weight    05/15/22 0314 110 (242 73)    05/14/22 0239 115 (252 43)    05/13/22 0547 110 (242 73)    05/13/22 0359 110 (242 73)    05/13/22 0319 110 (242 51)        Nutrition       Diet Orders   (From admission, onward)             Start     Ordered    05/13/22 0912  Diet Regular; Regular House  Diet effective now        References:    Nutrtion Support Algorithm Enteral vs  Parenteral   Question Answer Comment   Diet Type Regular    Regular Regular House    RD to adjust diet per protocol?  Yes        05/13/22 0912              Active Medications  Scheduled Meds:  Current Facility-Administered Medications   Medication Dose Route Frequency Provider Last Rate    amLODIPine  5 mg Oral Daily CARLOS ALBERTO Ramirez      atorvastatin 40 mg Oral QPM CARLOS ALBERTO Foreman      fenofibrate  48 mg Oral Daily [place], [place]      hydrALAZINE  10 mg Intravenous Q6H PRN Nikki Zeng MD      lisinopril  5 mg Oral Daily [facility] [place]      melatonin  3 mg Oral HS CARLOS ALBERTO Foreman       PRN Meds:   hydrALAZINE, 10 mg, Q6H PRN      Invasive Devices Review  Invasive Devices  Report    Peripheral Intravenous Line  Duration           Peripheral IV 05/12/22 Right;Ventral (anterior) Wrist 2 days    Peripheral IV 05/13/22 Left;Ventral (anterior) Forearm 1 day              ---------------------------------------------------------------------------------------  Care Time Delivered:   Upon my evaluation, this patient had a high probability of imminent or life-threatening deterioration due to CVA with hemorrhagic conversion, which required my direct attention, intervention, and personal management  I have personally provided 20 minutes (0000 to 0530) of critical care time, exclusive of procedures, teaching, family meetings, and any prior time recorded by providers other than myself  Martin Garner PA-C    Portions of the record may have been created with voice recognition software  Occasional wrong word or "sound a like" substitutions may have occurred due to the inherent limitations of voice recognition software    Read the chart carefully and recognize, using context, where substitutions have occurred

## 2022-05-15 NOTE — PROGRESS NOTES
NEUROLOGY RESIDENCY PROGRESS NOTE     Name: Ady Randolph  Age & Sex: 74 y o  male   MRN: [id number]  Unit/Bed#: ICU 04   Encounter: [id number]    ASSESSMENT & PLAN     * Ischemic stroke [facility])  Assessment & Plan  Stroke alert on 5/8/2022 10:24 PM with initial NIHSS of 1 and LKW 1900, initial Blood Pressure: 169/80  Initial presenting deficits were left upper extremity weakness  Inpatient stroke alert on 5/11 due to left facial droop and LUE   RRT on 5/12 for mechanical fall CTH + for small right parietal SAH  As a result of low score pt was determined to not be a candidate for tPA   Exam on 05/15/22: LUE drift, L facial droop - unchanged   Current Blood Pressure: 138/78, BP over 24 hours: BP  Min: 105/63  Max: 138/78    Home meds: no AP/AC at home    Workup:  Lab Results   Component Value Date    HGBA1C 5 9 (H) 05/08/2022    CHOLESTEROL 206 (H) 05/08/2022    LDLCALC 141 (H) 05/08/2022    TRIG 163 (H) 05/08/2022    INR 0 99 05/13/2022       CTH: no acute findings   CTA: did not receive due to contrast shortage during initial stroke alert   CTA 5/11 during inpatient stroke alert: LORI occlusion with evidence of dissection and L vertebral occlusion   MRI: R hemispheric infarcts watershed territory, MRA head and neck showing R ICA and R MCA occlusions and L vertebral artery stenosis/occlusion   MRI brain repeated on 5/14: petechial and stroke bed territory hemorrhage consistent with previous CTH, new sicne last MRI subacute areas of infarct in [facility] territory   Echocardiogram: LVEF 53%, grad 1 diastolic dysfunction, no LA dilation   o LUCY: LVEF 65% no PFO no atrial thrombus   Telemetry: no events    Pertinent scores:  - NIHSS: 2  Stroke Modified [place] Score: 0 (No baseline symptoms/disability)    Impression: numerous vessel occlusions and 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 discussed with neurosurgery, with small traumatic SAH noted to be stable on imaging, evolution of ischemia on repeat MRI  Started on monotherapy with ASA 81 mg and statin  Plan:  - Stroke pathway  - Discussed plan with neurology attending, Dr Cornelia Edgar  - BP: 120-160 due to dissection of ICA with occlusion of the ICA an dleft vertebral artery  - atorvastatin 40mg qhs  - Maintain glucose <180, SSI for coverage if indicated  - Medical management as per primary team appreciated  - Antiplatelet agents: ASA 81 mg monotherapy due to some conversion of stroke territory  - DVT ppx and SCDs  - Monitor on telemetry  - PT/OT/Speech/PM&R input appreciated         Robbie Landau  will need follow up in in 4 weeks with neurovascular attenidng  He will require a repeat MRI within 3 months weeks  SUBJECTIVE     Patient was seen and examined  No acute events overnight  MRI brain was completed overnight and was notable for evolution of the stroke, some hemorrhagic conversion of the stroke bed which was seen on previous CT and some new areas of ischemia since last imaging consistent with completion of the stroke  He was taken off DAPT at the time due to concern for new bleeding but on further evaluation the bleeding has been stable  ASA monotherapy restarted this AM  All questions and concerns answered to the patient and his wifes satisfaction  Changes to symptoms: improvement  Medication changes: DAPT -> ASA  PT/OT/rehab recs: post acute  Mood and sleep: sleeping well no mood issues  MRI repeated as above, MRI C/T aspine  No new labs  Stable VS    10 point review of systems conducted and otherwise negative other than as documented above    OBJECTIVE     Patient ID: Robbie Landau  is a 74 y o  male      Vitals:    05/15/22 0300 05/15/22 0314 05/15/22 0950 05/15/22 1146   BP: 125/69  138/74 138/78   BP Location: Right arm   Right arm   Pulse: 66   78   Resp: 18   18   Temp: 97 8 °F (36 6 °C)   98 3 °F (36 8 °C)   TempSrc: Oral   Oral   SpO2: 95%   94%   Weight:  110 kg (242 lb 11 6 oz)     Height:          Temperature:   Temp (24hrs), Av 3 °F (36 8 °C), Min:97 8 °F (36 6 °C), Max:98 9 °F (37 2 °C)    Temperature: 98 3 °F (36 8 °C)    Neurologic Exam     Mental Status   Oriented to person, place, and time  Cranial Nerves     CN III, IV, VI   Pupils are equal, round, and reactive to light  Extraocular motions are normal      CN V   Facial sensation intact  CN VII   Left facial weakness: central (improving)    CN VIII   CN VIII normal      CN IX, X   CN IX normal      CN XI   CN XI normal      Motor Exam   Muscle bulk: normal  Overall muscle tone: normal  Right arm pronator drift: absent  Left arm pronator drift: presentLUE weakness 4/5        LABORATORY DATA     Labs: I have personally reviewed pertinent reports  Results from last 7 days   Lab Units 05/14/22 0429 05/13/22  0423 05/12/22  0738 05/11/22  1642   WBC Thousand/uL 9 68 10 13 9 40 13 54*   HEMOGLOBIN g/dL 16 1 15 7 15 7 17 2*   HEMATOCRIT % 48 8 46 9 46 0 50 4*   PLATELETS Thousands/uL 231 248 228 302   NEUTROS PCT %  --  75 73 68   MONOS PCT %  --  9 9 10      Results from last 7 days   Lab Units 05/14/22  0429 05/13/22  0423 05/11/22  1645   POTASSIUM mmol/L 3 9 3 9 4 0   CHLORIDE mmol/L 107 109* 103   CO2 mmol/L 25 23 22   BUN mg/dL 21 20 17   CREATININE mg/dL 0 91 0 92 1 03   CALCIUM mg/dL 8 9 9 0 9 6     Results from last 7 days   Lab Units 05/14/22  0429 05/13/22  0423   MAGNESIUM mg/dL 2 0 2 0     Results from last 7 days   Lab Units 05/14/22  0429 05/13/22  0423   PHOSPHORUS mg/dL 3 5 3 2      Results from last 7 days   Lab Units 05/13/22  0423 05/08/22  2244   INR  0 99 0 87   PTT seconds  --  32     Results from last 7 days   Lab Units 05/11/22  1642   LACTIC ACID mmol/L 2 0           IMAGING & DIAGNOSTIC TESTING     Radiology Results: I have personally reviewed pertinent films in PACS      Other Diagnostic Testing: I have personally reviewed pertinent reports        ACTIVE MEDICATIONS     Current Facility-Administered Medications   Medication Dose Route Frequency    amLODIPine (NORVASC) tablet 5 mg  5 mg Oral Daily    aspirin (ECOTRIN LOW STRENGTH) EC tablet 81 mg  81 mg Oral Daily    atorvastatin (LIPITOR) tablet 40 mg  40 mg Oral QPM    fenofibrate (TRICOR) tablet 48 mg  48 mg Oral Daily    hydrALAZINE (APRESOLINE) injection 10 mg  10 mg Intravenous Q6H PRN    lisinopril (ZESTRIL) tablet 5 mg  5 mg Oral Daily    melatonin tablet 3 mg  3 mg Oral HS       Prior to Admission medications    Medication Sig Start Date End Date Taking? Authorizing Provider   amLODIPine (NORVASC) 5 mg tablet Take 1 tablet (5 mg total) by mouth daily 9/22/21  Yes Teresia Kussmaul, MD   aspirin 81 mg chewable tablet Chew 1 tablet (81 mg total)  in the morning  5/12/22 8/10/22 Yes Javier Reed MD   atorvastatin (LIPITOR) 40 mg tablet Take 1 tablet (40 mg total) by mouth every evening 5/11/22 8/9/22 Yes Javier Reed MD   clopidogrel (PLAVIX) 75 mg tablet Take 1 tablet (75 mg total) by mouth in the morning  5/12/22 8/10/22 Yes Javier Reed MD   fenofibrate (TRICOR) 48 mg tablet Take 1 tablet (48 mg total) by mouth daily 3/2/22 2/25/23 Yes Teresia Kussmaul, MD   lisinopril (ZESTRIL) 5 mg tablet Take 1 tablet (5 mg total) by mouth in the morning   5/12/22 6/11/22 Yes Javier Reed MD       ==  MD Caitlin Bell [facility] Neurology Residency, PGY-3

## 2022-05-15 NOTE — ASSESSMENT & PLAN NOTE
· 5/9 MRI carotids: Occluded right cervical and visualized intracranial internal carotid artery  Poor visualization of the left vertebral artery suspected developmental nondominant rather than occlusion or stenosis  · 5/11 CTA head and neck: Persistent long segment occlusion of RIGHT ICA cervical segment (with scattered areas of faint sliver of contrast enhancement) with near-complete occlusion in petrous to proximal cavernous segments, which may be due to thrombosed dissection  Occluded left vertebral artery (origin to distal V1 segment) with distal reconstitution at the level of proximal V2 segment  Question retrograde flow through a patent [place] of [place]  · 5/14 MRI Brain: Redemonstrated findings of late acute to early subacute right MCA and ICA watershed zones of cerebral infarction  New focal cortical hemorrhage in the R frontal lobe and minimal petechial hemorrhage in the right insula  Additional small areas of scattered products suspected within the right cerebral sulci may represent a combination of thromboembolism and/or SAH     · Neurosurgery consulted, appreciate recommendations  · Holding AC/AP, and DVT ppx in the setting of new SAH  · Neuro checks as above

## 2022-05-15 NOTE — ASSESSMENT & PLAN NOTE
Stroke alert on 5/8/2022 10:24 PM with initial NIHSS of 1 and LKW 1900, initial Blood Pressure: 169/80  Initial presenting deficits were left upper extremity weakness  Inpatient stroke alert on 5/11 due to left facial droop and LUE   RRT on 5/12 for mechanical fall CTH + for small right parietal SAH  As a result of low score pt was determined to not be a candidate for tPA   Exam on 05/15/22: LUE drift, L facial droop - unchanged   Current Blood Pressure: 138/78, BP over 24 hours: BP  Min: 105/63  Max: 138/78    Home meds: no AP/AC at home    Workup:  Lab Results   Component Value Date    HGBA1C 5 9 (H) 05/08/2022    CHOLESTEROL 206 (H) 05/08/2022    LDLCALC 141 (H) 05/08/2022    TRIG 163 (H) 05/08/2022    INR 0 99 05/13/2022       CTH: no acute findings   CTA: did not receive due to contrast shortage during initial stroke alert   CTA 5/11 during inpatient stroke alert: LORI occlusion with evidence of dissection and L vertebral occlusion   MRI: R hemispheric infarcts watershed territory, MRA head and neck showing R ICA and R MCA occlusions and L vertebral artery stenosis/occlusion   MRI brain repeated on 5/14: petechial and stroke bed territory hemorrhage consistent with previous CTH, new sicne last MRI subacute areas of infarct in [facility] territory   Echocardiogram: LVEF 99%, grad 1 diastolic dysfunction, no LA dilation   o LUCY: LVEF 65% no PFO no atrial thrombus   Telemetry: no events    Pertinent scores:  - NIHSS: 2  Stroke Modified Moriarty Score: 0 (No baseline symptoms/disability)    Impression: numerous vessel occlusions and 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 discussed with neurosurgery, with small traumatic SAH noted to be stable on imaging, evolution of ischemia on repeat MRI  Started on monotherapy with ASA 81 mg and statin      Plan:  - Stroke pathway  - Discussed plan with neurology attending, Dr Consuelo Preston  - BP: 120-160 due to dissection of ICA with occlusion of the ICA an dleft vertebral artery  - atorvastatin 40mg qhs  - Maintain glucose <180, SSI for coverage if indicated  - Medical management as per primary team appreciated  - Antiplatelet agents: ASA 81 mg monotherapy due to some conversion of stroke territory  - DVT ppx and SCDs  - Monitor on telemetry  - PT/OT/Speech/PM&R input appreciated

## 2022-05-15 NOTE — ASSESSMENT & PLAN NOTE
· Patient initially presented 5/9 with c/o transient dizziness, lightheadedness and 2 episodes of Left-sided ataxia  Reportedly on evaluation in the ER was found to have subtle deficits of left arm pronator drift and subtle confusion  · 5/9 CT head: No acute territorial infarct, hemorrhage, or midline shift  Asymmetric hyperattenuated appearance distal right M1/perisylvian branches of the right MCA suspicious for acute thrombus given patient symptomatology  · 5/9 MRI brain: Evidence of 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  · 5/11 Rapid Response was called for change in mental status with suspected vasovagal event  · CT head: Persistent long segment occlusion of RIGHT ICA cervical segment (with scattered areas of faint sliver of contrast enhancement) with near-complete occlusion in petrous to proximal cavernous segments, which may be due to thrombosed dissection  Occluded left vertebral artery (origin to distal V1 segment) with distal reconstitution at the level of proximal V2 segment  Question retrograde flow through a patent [place] of [place]  · 5/12 around 22-23:00 patient fell with head-strike  CT head: Subarachnoid hemorrhages within the right frontal and parietal lobe  No midline shift or hydrocephalus  · 5/14 MRI Brain: Redemonstrated findings of late acute to early subacute right MCA and ICA watershed zones of cerebral infarction  New focal cortical hemorrhage in the R frontal lobe and minimal petechial hemorrhage in the right insula  Additional small areas of scattered products suspected within the right cerebral sulci may represent a combination of thromboembolism and/or SAH     · Hold further AP/AC, DVT ppx given continued worsening of bleeds   · Continue Atorvastatin  · Avoid hypo/hypertension - goal systolic <633  · PT/OT consult

## 2022-05-15 NOTE — ASSESSMENT & PLAN NOTE
· Blood pressure is currently stable - goal SBP <140  · Continue amlodipine and lisinopril   · Avoid hypo/hypertension

## 2022-05-15 NOTE — ASSESSMENT & PLAN NOTE
· Patient fell with head-strike earlier this evening with no noted loss of consciousness    CT head was completed and noted Right frontal / parietal SAH  · Was initially being treated for R MCA CVA on ASA, Plavix, and Lovenox for DVT ppx - however will continue to hold for now given continued worsening hemorrhagic conversion  · Neurosurgery was consulted, appreciate recommendations  · Neurology following, appreciate recommendations  · Avoid hypotension, SBP > 140, hypoxia, and hypoglycemia  · PT/OT consult  · Will check blood cultures to ensure no infectious etiology

## 2022-05-16 ENCOUNTER — APPOINTMENT (INPATIENT)
Dept: MRI IMAGING | Facility: HOSPITAL | Age: 67
DRG: 064 | End: 2022-05-16
Payer: MEDICARE

## 2022-05-16 PROCEDURE — 97535 SELF CARE MNGMENT TRAINING: CPT

## 2022-05-16 PROCEDURE — 72148 MRI LUMBAR SPINE W/O DYE: CPT

## 2022-05-16 PROCEDURE — 99233 SBSQ HOSP IP/OBS HIGH 50: CPT | Performed by: INTERNAL MEDICINE

## 2022-05-16 PROCEDURE — 97116 GAIT TRAINING THERAPY: CPT

## 2022-05-16 PROCEDURE — 99232 SBSQ HOSP IP/OBS MODERATE 35: CPT | Performed by: PHYSICAL MEDICINE & REHABILITATION

## 2022-05-16 PROCEDURE — 99232 SBSQ HOSP IP/OBS MODERATE 35: CPT | Performed by: PSYCHIATRY & NEUROLOGY

## 2022-05-16 PROCEDURE — 97110 THERAPEUTIC EXERCISES: CPT

## 2022-05-16 RX ORDER — HEPARIN SODIUM 5000 [USP'U]/ML
5000 INJECTION, SOLUTION INTRAVENOUS; SUBCUTANEOUS EVERY 8 HOURS SCHEDULED
Status: DISCONTINUED | OUTPATIENT
Start: 2022-05-16 | End: 2022-05-17 | Stop reason: HOSPADM

## 2022-05-16 RX ADMIN — HEPARIN SODIUM 5000 UNITS: 5000 INJECTION INTRAVENOUS; SUBCUTANEOUS at 15:20

## 2022-05-16 RX ADMIN — LISINOPRIL 5 MG: 5 TABLET ORAL at 08:49

## 2022-05-16 RX ADMIN — ATORVASTATIN CALCIUM 40 MG: 40 TABLET, FILM COATED ORAL at 18:37

## 2022-05-16 RX ADMIN — AMLODIPINE BESYLATE 5 MG: 5 TABLET ORAL at 08:49

## 2022-05-16 RX ADMIN — Medication 3 MG: at 21:04

## 2022-05-16 RX ADMIN — ASPIRIN 81 MG: 81 TABLET, COATED ORAL at 08:49

## 2022-05-16 RX ADMIN — FENOFIBRATE 48 MG: 48 TABLET, FILM COATED ORAL at 08:51

## 2022-05-16 RX ADMIN — HEPARIN SODIUM 5000 UNITS: 5000 INJECTION INTRAVENOUS; SUBCUTANEOUS at 21:04

## 2022-05-16 NOTE — CASE MANAGEMENT
Case Management Discharge Planning Note    Patient name Aviva Leyva  Location ICU 04/ICU 04 MRN [id number]  : 1955 Date 2022       Current Admission Date: 2022  Current Admission Diagnosis:Ischemic stroke [facility])   Patient Active Problem List    Diagnosis Date Noted    [facility]    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([facility] ) 2022    Urinary incontinence 2022    Ischemic stroke ([facility] ) 2022    Hypertension, essential 2015    Hyperlipidemia 2012      LOS (days): 8  Geometric Mean LOS (GMLOS) (days): 2 20  Days to GMLOS:-5 5     OBJECTIVE:  Risk of Unplanned Readmission Score: 9 01         Current admission status: Inpatient   Preferred Pharmacy:   [facility] [address] [place] [address]  Phone: [phone] Fax: [phone]    Primary Care Provider: Darren Greenwood MD    Primary Insurance: MEDICARE  Secondary Insurance: [facility] HEALTH OPTIONS PROGRAM    DISCHARGE DETAILS:    Discharge planning discussed with[de-identified] Patient and [facility] of Choice: Yes  Comments - Freedom of Choice: Preference of rehab facilites offering a bed is [facility]  CM contacted family/caregiver?: Yes  Were Treatment Team discharge recommendations reviewed with patient/caregiver?: Yes  Did patient/caregiver verbalize understanding of patient care needs?: Yes  Were patient/caregiver advised of the risks associated with not following Treatment Team discharge recommendations?: Yes    Contacts  Patient Contacts: Costa Sharp  Relationship to Patient[de-identified] Family  Contact Method: In Person  Reason/Outcome: Referral, Discharge Planning              Other Referral/Resources/Interventions Provided:  Interventions: Acute Rehab  Referral Comments: [facility] is 1st choice and they have a bed to offer patient tomorrow if he remains stable for DC           Treatment Team Recommendation: Acute Rehab  Discharge Destination Plan[de-identified] Acute Rehab  Transport at Discharge : Wheelchair [facility] (Discussed likely use of [facility] with patient and spouse  They verbalized understanding of the out of pocket cost for this )                             IMM Given (Date):: 05/16/22  IMM Given to[de-identified] Patient     Additional Comments: [facility] has a bed for patient tomorrow  COVID test was requested  IMM was done with patient and family  CM department will continue to follow to assist with discharge coordination

## 2022-05-16 NOTE — PLAN OF CARE
Problem: OCCUPATIONAL THERAPY ADULT  Goal: Performs self-care activities at highest level of function for planned discharge setting  See evaluation for individualized goals  Description: Treatment Interventions: ADL retraining,Visual perceptual retraining,Functional transfer training,Endurance training,Patient/family training,Equipment evaluation/education,Fine motor coordination activities,Neuromuscular reeducation,Continued evaluation,Energy conservation,Activityengagement  Equipment Recommended: Shower/Tub chair with back ($)       See flowsheet documentation for full assessment, interventions and recommendations  Note: Limitation: Decreased ADL status, Decreased UE strength, Decreased endurance, Decreased self-care trans, Decreased fine motor control, Visual deficit, Decreased high-level ADLs     Assessment: Pt seen for skilled OT tx session day 3 from [id number] focusing on activity engagement, pt education  Pt agreeable to participate in session  Pt demonstrated improved activity tolerance despite reporting fatigue  Pt added that he is not sleeping well  Pt engaged in 30 second sit to stand w/ score of 3 inidicating + fall risk  Pt engaged in UBD, bathing seated OOB in chair after set- up  Pt demonstrated improved L UE AROM / strength and continues to benefit from cues and + time to incorporate L UE into ADLs  Continue to recommend post acute rehab when medically stable for discharge from acute care   Will continue to follow     OT Discharge Recommendation: Post acute rehabilitation services

## 2022-05-16 NOTE — OCCUPATIONAL THERAPY NOTE
Occupational Therapy Cancel Note     Patient Name: Christophe Brock  Today's Date: 5/16/2022  Problem List  Principal Problem:    Ischemic stroke [facility])  Active Problems:    Hyperlipidemia    Hypertension, essential    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(HCC)    Urinary incontinence    Subarachnoid hemorrhage ([facility] )          05/16/22 0745   OT Last Visit   OT Visit Date 05/16/22  (Monday)   Note Type   Note Type Cancelled Session   Cancel Reasons Patient off floor/test  (at MRI)   Chart review completed and attempted to see pt for OT tx session  Presently off floor at MRI  Will cancel and continue to follow as appropriate and schedule allows       Lizzy Szymanski OTR/L

## 2022-05-16 NOTE — TREATMENT PLAN
MRI imaging reviewed  MRI c and t spine without compressive etiology to explain patients symptoms of urinary incontinence  MRI brain was already reviewed on 5/15/2022 by neurology  Stroke territory again identified with small areas of petechial and SAH present in stroke bed  Can continue stroke care per neurology at this time  Continue to defer any neurosurgical intervention at this time  No further surgical needs  Neurosurgery will sign off and follow up as needed  Contact with further questions or concerns as needed

## 2022-05-16 NOTE — ASSESSMENT & PLAN NOTE
Stroke alert on 5/8/2022 10:24 PM with initial NIHSS of 1 and LKW 1900, initial Blood Pressure: 169/80  Initial presenting deficits were left upper extremity weakness  Inpatient stroke alert on 5/11 due to left facial droop and LUE   RRT on 5/12 for mechanical fall CTH + for small right parietal SAH  As a result of low score pt was determined to not be a candidate for tPA   Exam on 05/16/22: LUE drift, L facial droop - unchanged   Current Blood Pressure: 120/85, BP over 24 hours: BP  Min: 110/65  Max: 144/70    Home meds: no AP/AC at home    Workup:  Lab Results   Component Value Date    HGBA1C 5 9 (H) 05/08/2022    CHOLESTEROL 206 (H) 05/08/2022    LDLCALC 141 (H) 05/08/2022    TRIG 163 (H) 05/08/2022    INR 0 99 05/13/2022       CTH: no acute findings   CTA: did not receive due to contrast shortage during initial stroke alert   CTA 5/11 during inpatient stroke alert: LORI occlusion with evidence of dissection and L vertebral occlusion   MRI: R hemispheric infarcts watershed territory, MRA head and neck showing R ICA and R MCA occlusions and L vertebral artery stenosis/occlusion   MRI brain repeated on 5/14: petechial and stroke bed territory hemorrhage consistent with previous CTH, new sicne last MRI subacute areas of infarct in [facility] territory   Echocardiogram: LVEF 42%, grad 1 diastolic dysfunction, no LA dilation   o LUCY: LVEF 65% no PFO no atrial thrombus   Telemetry: no events    Pertinent scores:  - NIHSS: 2  Stroke Modified [place] Score: 0 (No baseline symptoms/disability)    Impression: numerous vessel occlusions and 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 discussed with neurosurgery, with small traumatic SAH noted to be stable on imaging, evolution of ischemia on repeat MRI  Started on monotherapy with ASA 81 mg and statin      Plan:  - Discussed plan with neurology attending, Dr Cosme Rolling  - BP: 120-160 due to dissection of ICA with occlusion of the ICA an dleft vertebral artery  - atorvastatin 40mg qhs  - Maintain glucose <180, SSI for coverage if indicated  - Medical management as per primary team appreciated  - Antiplatelet agents: ASA 81 mg monotherapy due to some conversion of stroke territory  - DVT ppx and SCDs  - Monitor on telemetry  - PT/OT/Speech/PM&R input appreciated

## 2022-05-16 NOTE — PLAN OF CARE
Problem: PHYSICAL THERAPY ADULT  Goal: Performs mobility at highest level of function for planned discharge setting  See evaluation for individualized goals  Description: Treatment/Interventions: Functional transfer training,LE strengthening/ROM,Elevations,Therapeutic exercise,Patient/family training,Endurance training,Equipment eval/education,Bed mobility,Gait training          See flowsheet documentation for full assessment, interventions and recommendations  Outcome: Progressing  Note: Prognosis: Good  Problem List: Decreased strength, Decreased endurance, Impaired balance, Decreased mobility, Decreased coordination, Decreased cognition, Decreased safety awareness, Impaired judgement, Impaired vision  Assessment: pt is noted to have improvement in mobility status from previous session w/ attainment of sitting in bedside chair and resumption of ambulation training  pt continues to need physical assistance and vebal instruction to safely mobilize  continued inpatient PT is needed to reduce fall risk  discharge recommendation is for inpatient rehab to maximize level of independence  PT Discharge Recommendation: Post acute rehabilitation services          See flowsheet documentation for full assessment

## 2022-05-16 NOTE — PROGRESS NOTES
NEUROLOGY RESIDENCY PROGRESS NOTE     Name: Keturah Rodrigez  Age & Sex: 74 y o  male   MRN: [id number]  Unit/Bed#: ICU 04   Encounter: [id number]    ASSESSMENT & PLAN     * Ischemic stroke [facility])  Assessment & Plan  Stroke alert on 5/8/2022 10:24 PM with initial NIHSS of 1 and LKW 1900, initial Blood Pressure: 169/80  Initial presenting deficits were left upper extremity weakness  Inpatient stroke alert on 5/11 due to left facial droop and LUE   RRT on 5/12 for mechanical fall CTH + for small right parietal SAH  As a result of low score pt was determined to not be a candidate for tPA   Exam on 05/16/22: LUE drift, L facial droop - unchanged   Current Blood Pressure: 120/85, BP over 24 hours: BP  Min: 110/65  Max: 144/70    Home meds: no AP/AC at home    Workup:  Lab Results   Component Value Date    HGBA1C 5 9 (H) 05/08/2022    CHOLESTEROL 206 (H) 05/08/2022    LDLCALC 141 (H) 05/08/2022    TRIG 163 (H) 05/08/2022    INR 0 99 05/13/2022       CTH: no acute findings   CTA: did not receive due to contrast shortage during initial stroke alert   CTA 5/11 during inpatient stroke alert: LORI occlusion with evidence of dissection and L vertebral occlusion   MRI: R hemispheric infarcts watershed territory, MRA head and neck showing R ICA and R MCA occlusions and L vertebral artery stenosis/occlusion   MRI brain repeated on 5/14: petechial and stroke bed territory hemorrhage consistent with previous CTH, new sicne last MRI subacute areas of infarct in [facility] territory   Echocardiogram: LVEF 25%, grad 1 diastolic dysfunction, no LA dilation   o LUCY: LVEF 65% no PFO no atrial thrombus   Telemetry: no events    Pertinent scores:  - NIHSS: 2  Stroke Modified Clark Score: 0 (No baseline symptoms/disability)    Impression: numerous vessel occlusions and 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 discussed with neurosurgery, with small traumatic SAH noted to be stable on imaging, evolution of ischemia on repeat MRI  Started on monotherapy with ASA 81 mg and statin  Plan:  - Discussed plan with neurology attending, Dr Jyoti Clark  - BP: 120-160 due to dissection of ICA with occlusion of the ICA an dleft vertebral artery  - atorvastatin 40mg qhs  - Maintain glucose <180, SSI for coverage if indicated  - Medical management as per primary team appreciated  - Antiplatelet agents: ASA 81 mg monotherapy due to some conversion of stroke territory  - DVT ppx and SCDs  - Monitor on telemetry  - PT/OT/Speech/PM&R input appreciated     Urinary incontinence  Assessment & Plan  Urinary incontinence noted since stroke on , initially he was not aware of when the incontinence was happening, now is becoming aware  Control is improving  MRI C/T/L spine unremarkable    Likely transient 2/2 stroke, would expect to improve        Bin Payne  will need follow up in in 4 weeks with neurovascular attending, brianda medina  He will not require outpatient neurological testing  SUBJECTIVE     Patient was seen and examined  No acute events overnight  Changes to symptoms: continues to improve  Medication changes: none  PT/OT/rehab recs: post acute  Mood and sleep: no issues  MRI L spine completed, no significant findings  No new labs  Stable VS    10 point review of systems conducted and otherwise negative other than as documented above    OBJECTIVE     Patient ID: Bni Payne  is a 74 y o  male  Vitals:    22 0730 22 0837 22 1200 22 1509   BP: 110/65  128/68 120/85   BP Location: Left arm  Left arm Left arm   Pulse: 66  70 74   Resp: 17  18 16   Temp: 98 7 °F (37 1 °C)  98 °F (36 7 °C) 97 7 °F (36 5 °C)   TempSrc: Oral  Oral Oral   SpO2: 92% 95% 95% 94%   Weight:       Height:          Temperature:   Temp (24hrs), Av 1 °F (36 7 °C), Min:97 7 °F (36 5 °C), Max:98 7 °F (37 1 °C)    Temperature: 97 7 °F (36 5 °C)    Neurologic Exam     Mental Status   Oriented to person, place, and time       Cranial Nerves     CN III, IV, VI   Pupils are equal, round, and reactive to light  Extraocular motions are normal      CN V   Facial sensation intact  CN VII   Left facial weakness: central (improving)    CN VIII   CN VIII normal      CN IX, X   CN IX normal      CN XI   CN XI normal      Motor Exam   Muscle bulk: normal  Overall muscle tone: normal  Right arm pronator drift: absent  Left arm pronator drift: presentLUE weakness 4/5        LABORATORY DATA     Labs: I have personally reviewed pertinent reports  Results from last 7 days   Lab Units 05/14/22 0429 05/13/22  0423 05/12/22  0738 05/11/22  1642   WBC Thousand/uL 9 68 10 13 9 40 13 54*   HEMOGLOBIN g/dL 16 1 15 7 15 7 17 2*   HEMATOCRIT % 48 8 46 9 46 0 50 4*   PLATELETS Thousands/uL 231 248 228 302   NEUTROS PCT %  --  75 73 68   MONOS PCT %  --  9 9 10      Results from last 7 days   Lab Units 05/14/22  0429 05/13/22  0423 05/11/22  1645   POTASSIUM mmol/L 3 9 3 9 4 0   CHLORIDE mmol/L 107 109* 103   CO2 mmol/L 25 23 22   BUN mg/dL 21 20 17   CREATININE mg/dL 0 91 0 92 1 03   CALCIUM mg/dL 8 9 9 0 9 6     Results from last 7 days   Lab Units 05/14/22  0429 05/13/22  0423   MAGNESIUM mg/dL 2 0 2 0     Results from last 7 days   Lab Units 05/14/22  0429 05/13/22  0423   PHOSPHORUS mg/dL 3 5 3 2      Results from last 7 days   Lab Units 05/13/22  0423   INR  0 99     Results from last 7 days   Lab Units 05/11/22  1642   LACTIC ACID mmol/L 2 0           IMAGING & DIAGNOSTIC TESTING     Radiology Results: I have personally reviewed pertinent films in PACS      Other Diagnostic Testing: I have personally reviewed pertinent reports        ACTIVE MEDICATIONS     Current Facility-Administered Medications   Medication Dose Route Frequency    amLODIPine (NORVASC) tablet 5 mg  5 mg Oral Daily    aspirin (ECOTRIN LOW STRENGTH) EC tablet 81 mg  81 mg Oral Daily    atorvastatin (LIPITOR) tablet 40 mg  40 mg Oral QPM    fenofibrate (TRICOR) tablet 48 mg  48 mg Oral Daily    heparin (porcine) subcutaneous injection 5,000 Units  5,000 Units Subcutaneous Q8H [address]    hydrALAZINE (APRESOLINE) injection 10 mg  10 mg Intravenous Q6H PRN    lisinopril (ZESTRIL) tablet 5 mg  5 mg Oral Daily    melatonin tablet 3 mg  3 mg Oral HS       Prior to Admission medications    Medication Sig Start Date End Date Taking? Authorizing Provider   amLODIPine (NORVASC) 5 mg tablet Take 1 tablet (5 mg total) by mouth daily 9/22/21  Yes Cyril Helm MD   aspirin 81 mg chewable tablet Chew 1 tablet (81 mg total)  in the morning  5/12/22 8/10/22 Yes Jay Matson MD   atorvastatin (LIPITOR) 40 mg tablet Take 1 tablet (40 mg total) by mouth every evening 5/11/22 8/9/22 Yes Jay Matson MD   clopidogrel (PLAVIX) 75 mg tablet Take 1 tablet (75 mg total) by mouth in the morning  5/12/22 8/10/22 Yes Jay Matson MD   fenofibrate (TRICOR) 48 mg tablet Take 1 tablet (48 mg total) by mouth daily 3/2/22 2/25/23 Yes Cyril Helm MD   lisinopril (ZESTRIL) 5 mg tablet Take 1 tablet (5 mg total) by mouth in the morning   5/12/22 6/11/22 Yes Jay Matson MD       ==  Frank Canavan, MD [address] Neurology Residency, PGY-3

## 2022-05-16 NOTE — ASSESSMENT & PLAN NOTE
· 5/9 MRI carotids: Occluded right cervical and visualized intracranial internal carotid artery  Poor visualization of the left vertebral artery suspected developmental nondominant rather than occlusion or stenosis  · 5/11 CTA head and neck: Persistent long segment occlusion of RIGHT ICA cervical segment (with scattered areas of faint sliver of contrast enhancement) with near-complete occlusion in petrous to proximal cavernous segments, which may be due to thrombosed dissection  Occluded left vertebral artery (origin to distal V1 segment) with distal reconstitution at the level of proximal V2 segment  Question retrograde flow through a patent [place]  · 5/14 MRI Brain: Redemonstrated findings of late acute to early subacute right MCA and ICA watershed zones of cerebral infarction  New focal cortical hemorrhage in the R frontal lobe and minimal petechial hemorrhage in the right insula  Additional small areas of scattered products suspected within the right cerebral sulci may represent a combination of thromboembolism and/or SAH     · Neurosurgery consulted, appreciate recommendations, no surgical intervention   · C/w Aspirin monotherapy per neuro, holding chemical dvt ppx for now    · Neuro checks as above

## 2022-05-16 NOTE — QUICK NOTE
Rounds completed by critical care team  Patient deemed stable for transfer to /S with telemetry with neurology follow up  Discussed case with [facility] Dr Corinne Hoar and will transfer to service of Dr Desmond Mercado  Please reach out to critical care service with further questions or concerns  Addendum: reviewed care with neurology Dr Houston STILES with dvt ppx, heparin ordered

## 2022-05-16 NOTE — PHYSICAL THERAPY NOTE
Physical Therapy Cancellation Note       05/16/22 0743   PT Last Visit   PT Visit Date 05/16/22   Note Type   Note Type Cancelled Session   Cancel Reasons Patient off floor/test  (attempted to see pt for PT session but he is leaving floor for MRI   will follow and continue PT as appropriate )     Alysa Saldana, PT

## 2022-05-16 NOTE — ASSESSMENT & PLAN NOTE
· Patient fell with head-strike earlier this evening with no noted loss of consciousness    CT head was completed and noted Right frontal / parietal SAH  · Was initially being treated for R MCA CVA on ASA, Plavix, and Lovenox for DVT ppx  · Neurosurgery was consulted, appreciate recommendations  · Neurology following, appreciate recommendations  · Avoid hypotension, SBP > 140, hypoxia, and hypoglycemia  · PT/OT consult  · Neuro OK with aspirin monotherapy for now

## 2022-05-16 NOTE — PROGRESS NOTES
[facility]  Progress Note - Melonied Ishaan  1955, 74 y o  male MRN: [id number]  Unit/Bed#: ICU 04 Encounter: [id number]  Primary Care Provider: Janeth Eduardo MD   Date and time admitted to hospital: 5/8/2022 10:24 PM    Subarachnoid hemorrhage [facility])  Assessment & Plan  · Patient fell with head-strike earlier this evening with no noted loss of consciousness  CT head was completed and noted Right frontal / parietal SAH  · Was initially being treated for R MCA CVA on ASA, Plavix, and Lovenox for DVT ppx  · Neurosurgery was consulted, appreciate recommendations  · Neurology following, appreciate recommendations  · Avoid hypotension, SBP > 140, hypoxia, and hypoglycemia  · PT/OT consult  · Neuro OK with aspirin monotherapy for now     * Ischemic stroke [facility])  Assessment & Plan  · Patient initially presented 5/9 with c/o transient dizziness, lightheadedness and 2 episodes of Left-sided ataxia  Reportedly on evaluation in the ER was found to have subtle deficits of left arm pronator drift and subtle confusion  · 5/9 CT head: No acute territorial infarct, hemorrhage, or midline shift  Asymmetric hyperattenuated appearance distal right M1/perisylvian branches of the right MCA suspicious for acute thrombus given patient symptomatology  · 5/9 MRI brain: Evidence of 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  · 5/11 Rapid Response was called for change in mental status with suspected vasovagal event  · CT head: Persistent long segment occlusion of RIGHT ICA cervical segment (with scattered areas of faint sliver of contrast enhancement) with near-complete occlusion in petrous to proximal cavernous segments, which may be due to thrombosed dissection  Occluded left vertebral artery (origin to distal V1 segment) with distal reconstitution at the level of proximal V2 segment  Question retrograde flow through a patent [place]    · 5/12 around 22-23:00 patient fell with head-strike  CT head: Subarachnoid hemorrhages within the right frontal and parietal lobe  No midline shift or hydrocephalus  · 5/14 MRI Brain: Redemonstrated findings of late acute to early subacute right MCA and ICA watershed zones of cerebral infarction  New focal cortical hemorrhage in the R frontal lobe and minimal petechial hemorrhage in the right insula  Additional small areas of scattered products suspected within the right cerebral sulci may represent a combination of thromboembolism and/or SAH  · Aspirin monotherapy given some hemorrhagic conversion, holding dvt ppx per neuro for now  · Continue Atorvastatin  · Avoid hypo/hypertension - goal systolic <870  · PT/OT consult    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[facility])  Assessment & Plan  · 5/9 MRI carotids: Occluded right cervical and visualized intracranial internal carotid artery  Poor visualization of the left vertebral artery suspected developmental nondominant rather than occlusion or stenosis  · 5/11 CTA head and neck: Persistent long segment occlusion of RIGHT ICA cervical segment (with scattered areas of faint sliver of contrast enhancement) with near-complete occlusion in petrous to proximal cavernous segments, which may be due to thrombosed dissection  Occluded left vertebral artery (origin to distal V1 segment) with distal reconstitution at the level of proximal V2 segment  Question retrograde flow through a patent [place]  · 5/14 MRI Brain: Redemonstrated findings of late acute to early subacute right MCA and ICA watershed zones of cerebral infarction  New focal cortical hemorrhage in the R frontal lobe and minimal petechial hemorrhage in the right insula  Additional small areas of scattered products suspected within the right cerebral sulci may represent a combination of thromboembolism and/or SAH     · Neurosurgery consulted, appreciate recommendations, no surgical intervention   · C/w Aspirin monotherapy per neuro, holding chemical dvt ppx for now    · Neuro checks as above    Urinary incontinence  Assessment & Plan  · Urinary retention protocol  · Frequent voiding attempts  · MRI C/T/L spine pending     Hypertension, essential  Assessment & Plan  · Blood pressure is currently stable - goal SBP <140  · Continue amlodipine and lisinopril   · Avoid hypo/hypertension    Hyperlipidemia  Assessment & Plan  · Continue atorvastatin and fenofibrate      ----------------------------------------------------------------------------------------  HPI/24hr events:     No acute events overnight  Patient had MRI C-spine and T-spine  T-spine showed mild noncompressive degenerative disease  C-spine showed multilevel disc degeneration with mild spinal canal and foraminal stenosis     ---------------------------------------------------------------------------------------  SUBJECTIVE  Patient seen after MRI, no new complaints  Denies pain, fever, chills, chest pain  Review of Systems   Constitutional: Positive for fatigue  Negative for chills, fever and unexpected weight change  HENT: Negative for congestion, rhinorrhea, sneezing and sore throat  Respiratory: Negative for cough, shortness of breath and wheezing  Cardiovascular: Negative for chest pain, palpitations and leg swelling  Gastrointestinal: Negative for abdominal pain, constipation, diarrhea, nausea and vomiting  Genitourinary: Negative for dysuria  Musculoskeletal: Negative for arthralgias  Neurological: Positive for weakness  Negative for dizziness and numbness          L facial droop  LUE drift      Review of systems was reviewed and negative unless stated above in HPI/24-hour events   ---------------------------------------------------------------------------------------    Transfer to M/S with tele     Code Status: Level 1 - Full Code  ---------------------------------------------------------------------------------------  ICU CORE MEASURES    Prophylaxis   VTE Pharmacologic Prophylaxis: Pharmacologic VTE Prophylaxis contraindicated due to [facility]  VTE Mechanical Prophylaxis: sequential compression device  Stress Ulcer Prophylaxis: Prophylaxis Not Indicated       Invasive Devices Review  Invasive Devices  Report    Peripheral Intravenous Line  Duration           Peripheral IV 22 Right;Ventral (anterior) Wrist 4 days    Peripheral IV 22 Left;Ventral (anterior) Forearm 2 days              ---------------------------------------------------------------------------------------  OBJECTIVE    Vitals   Vitals:    22 0552 22 0600 22 0700 22 0730   BP:    110/65   BP Location:    Left arm   Pulse:  59 59 66   Resp:  15 15 17   Temp:    98 7 °F (37 1 °C)   TempSrc:    Oral   SpO2:  93%  92%   Weight: 110 kg (243 lb 2 7 oz)      Height:         Temp (24hrs), Av 4 °F (36 9 °C), Min:98 1 °F (36 7 °C), Max:98 7 °F (37 1 °C)  Current: Temperature: 98 7 °F (37 1 °C)     Invasive/non-invasive ventilation settings   Respiratory  Report   Lab Data (Last 4 hours)    None         O2/Vent Data (Last 4 hours)    None              Physical Exam  Constitutional:       General: He is not in acute distress  Appearance: He is well-developed  He is obese  He is not diaphoretic  HENT:      Head: Normocephalic and atraumatic  Mouth/Throat:      Mouth: Mucous membranes are moist       Pharynx: Oropharynx is clear  No oropharyngeal exudate  Eyes:      General: No scleral icterus  Cardiovascular:      Rate and Rhythm: Normal rate and regular rhythm  Heart sounds: Normal heart sounds  No murmur heard  Pulmonary:      Effort: Pulmonary effort is normal  No respiratory distress  Breath sounds: Normal breath sounds  No wheezing  Abdominal:      General: Bowel sounds are normal  There is no distension  Palpations: Abdomen is soft  Tenderness: There is no abdominal tenderness  Musculoskeletal:      Right lower leg: No edema        Left lower leg: No edema  Neurological:      Mental Status: He is alert  Comments: LUE drift, left facial droop        Laboratory and Diagnostics:  Results from last 7 days   Lab Units 05/14/22  0429 05/13/22  0423 05/12/22  0738 05/11/22  1642 05/10/22  0342   WBC Thousand/uL 9 68 10 13 9 40 13 54* 9 22   HEMOGLOBIN g/dL 16 1 15 7 15 7 17 2* 15 6   HEMATOCRIT % 48 8 46 9 46 0 50 4* 46 2   PLATELETS Thousands/uL 231 248 228 302 218   NEUTROS PCT %  --  75 73 68  --    MONOS PCT %  --  9 9 10  --      Results from last 7 days   Lab Units 05/14/22  0429 05/13/22  0423 05/11/22  1645 05/10/22  0342   SODIUM mmol/L 140 140 136 139   POTASSIUM mmol/L 3 9 3 9 4 0 3 8   CHLORIDE mmol/L 107 109* 103 107   CO2 mmol/L 25 23 22 23   ANION GAP mmol/L 8 8 11 9   BUN mg/dL 21 20 17 12   CREATININE mg/dL 0 91 0 92 1 03 0 86   CALCIUM mg/dL 8 9 9 0 9 6 8 6   GLUCOSE RANDOM mg/dL 119 131 131 114     Results from last 7 days   Lab Units 05/14/22  0429 05/13/22  0423   MAGNESIUM mg/dL 2 0 2 0   PHOSPHORUS mg/dL 3 5 3 2      Results from last 7 days   Lab Units 05/13/22  0423   INR  0 99          Results from last 7 days   Lab Units 05/11/22  1642   LACTIC ACID mmol/L 2 0     ABG:    VBG:          Micro  Results from last 7 days   Lab Units 05/15/22  0107 05/15/22  0101   BLOOD CULTURE  No Growth at 24 hrs  No Growth at 24 hrs  Imaging: as per A/P I have personally reviewed pertinent reports  and I have personally reviewed pertinent films in PACS    Intake and Output  I/O       05/14 0701  05/15 0700 05/15 0701 05/16 0700 05/16 0701 05/17 0700    P  O   960     Total Intake(mL/kg)  960 (8 7)     Urine (mL/kg/hr) 200 (0 1) 375 (0 1)     Stool 0      Total Output 200 375     Net -200 +585            Unmeasured Urine Occurrence 1 x 3 x     Unmeasured Stool Occurrence 1 x            Height and Weights   Height: 6' (182 9 cm)  IBW (Ideal Body Weight): 77 6 kg  Body mass index is 32 98 kg/m²    Weight (last 2 days)     Date/Time Weight    05/16/22 0552 110 (243 17)    05/15/22 0314 110 (242 73)    05/14/22 0239 115 (252 43)            Nutrition       Diet Orders   (From admission, onward)             Start     Ordered    05/13/22 0912  Diet Regular; Regular House  Diet effective now        References:    Nutrtion Support Algorithm Enteral vs  Parenteral   Question Answer Comment   Diet Type Regular    Regular Regular House    RD to adjust diet per protocol? Yes        05/13/22 0912                Active Medications  Scheduled Meds:  Current Facility-Administered Medications   Medication Dose Route Frequency Provider Last Rate    amLODIPine  5 mg Oral Daily CARLOS ALBERTO Graves      aspirin  81 mg Oral Daily [address] [place], [place]      atorvastatin  40 mg Oral QPM CARLOS ALBERTO Foreman      fenofibrate  48 mg Oral Daily Justin Zhu [place]      hydrALAZINE  10 mg Intravenous Q6H PRN Nikki Zeng MD      lisinopril  5 mg Oral Daily Soledad Foreman      melatonin  3 mg Oral HS CARLOS ALBERTO Foreman       Continuous Infusions:     PRN Meds:   hydrALAZINE, 10 mg, Q6H PRN        Allergies   Allergies   Allergen Reactions    Penicillins Rash         Nikki Zeng MD      Portions of the record may have been created with voice recognition software  Occasional wrong word or "sound a like" substitutions may have occurred due to the inherent limitations of voice recognition software    Read the chart carefully and recognize, using context, where substitutions have occurred

## 2022-05-16 NOTE — ASSESSMENT & PLAN NOTE
Urinary incontinence noted since stroke on Sunday, initially he was not aware of when the incontinence was happening, now is becoming aware  Control is improving       MRI C/T/L spine unremarkable    Likely transient 2/2 stroke, would expect to improve

## 2022-05-16 NOTE — PROGRESS NOTES
PM&R Consult Follow Up Note  Aviva Leyva  74 y o  male MRN: [id number]  Unit/Bed#: ICU 04 Encounter: [id number]     Assessment:  Rehabilitation Diagnosis:   · R MCA CVA and with R SAH (fall vs  Hemorrhagic conversion)  · L sided weakness - improving   · Impaired motor planning, processing - markedly improved   · Bowel/bladder incontinence/disinhibition - improving   · L inattention - continues to respond well to cuing  · Possibly new dysphagia - wife reports improvement without choking or cough  Would still get this evaluated again in rehab  · Impaired sequencing - improving   · Impaired divided attention - improving   · Risk of cognitive fatigue  · Impaired mobility and self care   · Impaired cognition     Recommendations:  Rehabilitation Plan:  · Please repeat PT/OT evals after event yesterday  · Repeat dysphagia eval in rehab  Wife feels swallow has improved, without choking/coughing  · Excellent candidate for ARC, and possibly bed available tomorrow per wife  ? He has L sided weakness, increased facial droop, and is at risk for dysphagia  ? He has L inattention with functional activities impacting his balance  ? He has significant motor planning difficulties requiring multiple verbal, visual, and physical cues for sequencing with activities like transfers and bathroom activities  ? He has bowel/bladder incontinence (disinhibited due to central process) and will need bowel/bladder training  ? He may have some cognitive fatigue as well  ? Would benefit from rehab psychology as well for emotional adjustment to impairments  ? Will need close monitoring of neuro status  ? He can tolerate 3-5 hours of therapy, 5-7 days a week  ? He will need rehab nursing and PT/OT/SLP  ? Suspect he will have an excellent recovery  ? His wife is open to family training, and plans to bring him home after rehab  · Wife's first choice is [facility]    Per wife, she discussed with them and they are following his progress          Medical Co-morbidities Pending Issues:  R MCA CVA  - Initial MRI showed recent R hemisphere internal/deep watershed infarcts as well as frontoparietotemporal cortical infarcts with few tiny occipital infarcts that may be embolic    - w/ occluded R ICA in both cervical and intracranial portions   - Per Nsx, no Nsx intervention or follow-up required for this   - DAPT not recommended per Neuro - plan is for ASA monotherapy (especially in setting of conversion in stroke territory)  - 5/14 Repeat MRI Brain: new focal cortical hemorrhage in R frontal lobe and minimal petechial hemorrhage in R insula with additional small areas of scattered products  - Risk factor modification  - Outpatient f/u with Neurovascular clinic    R SAH  - After fall with headstrike on 5/11     - 5/12 CTH showed SAH in R frontal and parietal lobes without midline shift or hydrocephalus   - 5/14 MRI C-Spine showed mild congenital and acquired spinal stenosis, multilevel facet arthropathy, and no cord signal abnormality   - 5/13 CTH without definite interval changes related to R frontoparietal MCA infarct associated with small patchy hemorrhage   - 5/14 CTH with evolving areas of infarction within the R cerebral hemisphere  Cleared for ASA and heparin  HTN  - Lisinopril, Amlodipine     Bowel/bladder incontinence  - Disinhibited  Has been since admission    - UA negative  - If negative, recommend timed voids Q4-6 hour    - PVR x1 65  Would continue timed voids to help maintain continence  - This is improving, and he is using urinal more consistently  - MRI C/T spine with only mild degenerative disease an no cord signal abnormality    - MRI L-Spine: Small L foraminal disc protrusion at the L4-5 level       #Vitals: Stable  #Labs: BMP, CBC stable  #Pain: Not currently an issue  #Bowel: Last BM 5/16 - intermittent incontinence  #Bladder: Bladder incontinence noted     #Skin/Pressure Injury Prevention: Turn Q2hr in bed, with weight shifts [id number] in wheelchair  #DVT Prophylaxis: SCDs, HSQ  #GI Prophylaxis: PO diet    Thank you for allowing the PM&R service to participate in the care of Mr  Keturah Rodrigez    We will continue to follow he progress with you  Please do not hesitate to call with questions or concerns  Last Seen: 05/13/2022    Interval History/Subjective:   Since last seen, ASA/Plavix were discontinued, and chem ppx removed  After discussion with Neurology, it was decided that DAPT unlikely to change this occluded R ICA, and given evidence of bleed, best bet is to just continue ASA monotherapy  Also cleared for HSQ for DVT PPx  Wife feels he is doing better  He has had less trouble swallowing, drinking, and has not coughed or choked  His L side is feeling stronger, and his coordination is better  His wife notes that he still does not pay that much attention to that left side  He denies any CP, SOB, headaches, double/blurry vision, N/V, abdominal pain  ROS: A 10 point review of systems was negative except for what is noted in the HPI  Physical Therapy: modA bed mobility, modA transfers, modA ambualtion 5' with RW      Occupational Therapy: mod INd eating, Sup grooming, Rivera UB bathing, Rivera LB bathing,Rivera UB dressing  Speech Therapy: pending  Vital Signs:      Temp:  [97 7 °F (36 5 °C)-98 7 °F (37 1 °C)] 97 7 °F (36 5 °C)  HR:  [59-78] 74  Resp:  [15-23] 16  BP: (110-144)/(65-85) 120/85   Intake/Output Summary (Last 24 hours) at 5/16/2022 1543  Last data filed at 5/16/2022 0630  Gross per 24 hour   Intake 360 ml   Output 375 ml   Net -15 ml          Gen: No acute distress, Well-nourished, well-appearing  HEENT: Moist mucus membranes, Normocephalic/Atraumatic  Cardiovascular: Regular rate, rhythm, S1/S2  Distal pulses palpable  Heme/Extr: No edema  Pulmonary: Non-labored breathing  Lungs CTAB  : No garcia  GI: Soft, non-tender, non-distended  BS+  MSK: ROM is WFL in all extremities   No effusions or deformities  Bulk is symmetric  See below for MMT scores  Integumentary: Skin is warm, dry  Neuro: AAOx3, CN 2-12 intact except for L facial weakness  Dysarthria, but perfectly intelligible  Appropriate to questioning  Tone is normal  Improved motor planning, markedly improved processing  Still some L neglect, but able to attend to that side easily  MMT:   Strength:   Right  Left  Site  Right  Left  Site    5 2-  S Ab: Shoulder Abductors  5  4 HF: Hip Flexors    5 5  EF: Elbow Flexors  NT NT KF: Knee Flexors    5  5  EE: Elbow Extensors  5  4  KE: Knee Extensors    5  4+ WE: Wrist Extensors  5  5  DR: Dorsi Flexors    5  4+  FF: Finger Flexors  5  5  PF: Plantar Flexors    5  1-2  HI: Hand Intrinsics  5  4  EHL: Extensor Hallucis Longus   Psych: Normal mood and blunted affect (this is improving)    Laboratory:  Reviewed     Lab Results   Component Value Date    HGB 16 1 05/14/2022    HGB 16 3 10/06/2016    HCT 48 8 05/14/2022    HCT 49 8 10/06/2016    WBC 9 68 05/14/2022    WBC 7 6 10/06/2016     Lab Results   Component Value Date    BUN 21 05/14/2022    BUN 13 10/06/2016     10/06/2016    K 3 9 05/14/2022    K 3 8 10/06/2016     05/14/2022     10/06/2016    CREATININE 0 91 05/14/2022    CREATININE 1 09 10/06/2016     Lab Results   Component Value Date    PROTIME 13 1 05/13/2022    INR 0 99 05/13/2022        Imaging:  Reviewed      - 5/12 CTH showed SAH in R frontal and parietal lobes without midline shift or hydrocephalus   - 5/14 MRI C-Spine showed mild congenital and acquired spinal stenosis, multilevel facet arthropathy, and no cord signal abnormality   - 5/13 CTH without definite interval changes related to R frontoparietal MCA infarct associated with small patchy hemorrhage   - 5/14 CTH with evolving areas of infarction within the R cerebral hemisphere    - Worsening L sided weakness, increased speech difficulties   CTH showed R fronto/parietal SAH   - Repeat [facility] 5/13 without definite interval changes

## 2022-05-16 NOTE — OCCUPATIONAL THERAPY NOTE
Occupational Therapy Progress Note     Patient Name: Ash Camarillo  Today's Date: 5/16/2022  Problem List  Principal Problem:    Ischemic stroke [facility])  Active Problems:    Hyperlipidemia    Hypertension, essential    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(HCC)    Urinary incontinence    Subarachnoid hemorrhage ([facility] [address] )       05/16/22 1240   OT Last Visit   OT Visit Date 05/16/22  (Tuesday)   Note Type   Note Type Treatment   Restrictions/Precautions   Weight Bearing Precautions Per Order No   Other Precautions Chair Alarm; Bed Alarm;Multiple lines;Telemetry; Fall Risk;Hard of hearing   General   Response to Previous Treatment Patient reporting fatigue but able to participate  (pt reports not sleeping well)   Family/Caregiver Present Yes, pt's wife, Enzo Blank present during session   Pain Assessment   Pain Assessment Tool 0-10   Pain Score No Pain   ADL   Where Assessed Chair   Eating Assistance 6  Modified independent   Eating Deficit Setup; Increased time to complete   Eating Comments seated OOB In chair at end of session aawiting lunch tray   Grooming Assistance 5  Supervision/Setup   Grooming Deficit Setup;Supervision/safety; Increased time to complete;Verbal cueing   Grooming Comments seated in chair at tray table  Cues to use L UE functionally during self care ADLs  Pt reports R hand dominance   UB Bathing Assistance 4  Minimal Assistance   UB Bathing Deficit Setup;Verbal cueing;Supervision/safety; Increased time to complete  (A to wash back)   UB Bathing Comments + time and cues to was under R arm and R UE using L UE   LB Bathing Assistance 4  Minimal Assistance   LB Bathing Deficit Setup;Steadying; Increased time to complete;Perineal area; Buttocks   LB Bathing Comments standing using RW w/ min A   UB Dressing Assistance 4  Minimal Assistance   UB Dressing Deficit Pull around back; Fasteners;Setup; Increased time to complete   LB Dressing Assistance Unable to assess   Toileting Assistance  Unable to assess   Toileting Comments denied need to void   Bed Mobility   Supine to Sit Unable to assess   Sit to Supine Unable to assess   Additional Comments Pt seated OOB in chair upon arrival and end of session w/ needs met, call bell in reach and chair alarm activated  Wife present   Transfers   Sit to Stand 4  Minimal assistance   Additional items Assist x 1; Armrests; Increased time required;Verbal cues   Stand to Sit 4  Minimal assistance   Additional items Assist x 1; Increased time required;Verbal cues;Armrests   Additional Comments 30 second sit to stand w/ score of 3  Scores < 12 for men 65-69 indicates + fall risk   Functional Mobility   Functional Mobility 4  Minimal assistance   Additional Comments engaged in functional mobility using RW w/ min A   Additional items Rolling walker   Coordination   Fine Motor able grasp pen efficiently in R UE to legibly write name   Cognition   Overall Cognitive Status Impaired   Arousal/Participation Alert; Cooperative   Attention Attends with cues to redirect   Orientation Level Oriented to person;Oriented to place;Oriented to situation  (generally to time)   Memory Decreased recall of recent events; Other (Comment)  (details, timeline events)   Following Commands Follows multistep commands with increased time or repetition   Comments Identified pt by full name and birthdate  Alert and generally oriented  Able to follow directions, communciate wants / needs w/ flat affect  Activity Tolerance   Activity Tolerance Patient limited by fatigue   Medical Staff Made Aware per RN, My Patrick appropriate to see pt   Assessment   Assessment Pt seen for skilled OT tx session day 3 from [id number] focusing on activity engagement, pt education  Pt agreeable to participate in session  Pt demonstrated improved activity tolerance despite reporting fatigue  Pt added that he is not sleeping well  Pt engaged in 30 second sit to stand w/ score of 3 inidicating + fall risk   Pt engaged in UBD, bathing seated OOB in chair after set- up  Pt demonstrated improved L UE AROM / strength and continues to benefit from cues and + time to incorporate L UE into ADLs  Continue to recommend post acute rehab when medically stable for discharge from acute care  Will continue to follow   Plan   Treatment Interventions ADL retraining;Functional transfer training; Endurance training;UE strengthening/ROM; Patient/family training;Equipment evaluation/education; Compensatory technique education; Fine motor coordination activities; Neuromuscular reeducation;Continued evaluation; Energy conservation; Activityengagement   Goal Expiration Date 05/21/22  (goals extended as pt continues to demo progress)   OT Treatment Day 3  (Monday)   OT Frequency 3-5x/wk   Recommendation   OT Discharge Recommendation Post acute rehabilitation services   Equipment Recommended Bedside commode; Shower/Tub chair with back ($)   AM-PAC Daily Activity Inpatient   Lower Body Dressing 2   Bathing 2   Toileting 2   Upper Body Dressing 3   Grooming 3   Eating 4   Daily Activity Raw Score 16   Daily Activity Standardized Score (Calc for Raw Score >=11) 35 96   AM-[facility] Applied Cognition Inpatient   Following a Speech/Presentation 3   Understanding Ordinary Conversation 4   Taking Medications 3   Remembering Where Things Are Placed or Put Away 3   Remembering List of 4-5 Errands 2   Taking Care of Complicated Tasks 2   Applied Cognition Raw Score 17   Applied Cognition Standardized Score 36 52   Barthel Index   Feeding 5   Bathing 0   Grooming Score 0   Dressing Score 5   Bladder Score 5   Bowels Score 5   Toilet Use Score 5   Transfers (Bed/Chair) Score 10   Mobility (Level Surface) Score 0   Stairs Score 0   Barthel Index Score 35   Modified [place] Scale   Modified [place] Scale 4      The patient's raw score on the AM-PAC Daily Activity inpatient short form is 16, standardized score is 35 96, less than 39 4  Patients at this level are likely to benefit from discharge to post-acute rehabilitation services  Please refer to the recommendation of the Occupational Therapist for safe discharge planning      [facility], OTR/L

## 2022-05-16 NOTE — PHYSICAL THERAPY NOTE
PHYSICAL THERAPY TREATMENT NOTE    Patient Name: Trisha Franco  Today's Date: 5/16/2022 05/16/22 1027   PT Last Visit   PT Visit Date 05/16/22   Pain Assessment   Pain Assessment Tool 0-10   Pain Score No Pain   Restrictions/Precautions   Other Precautions Impulsive; Chair Alarm; Bed Alarm;O2;Telemetry; Fall Risk   General   Chart Reviewed Yes   Additional Pertinent History room air resting pulse ox 97% and 63 BPM, active 94% and 81 BPM    Family/Caregiver Present Yes   Cognition   Arousal/Participation Alert; Cooperative   Attention Attends with cues to redirect   Orientation Level Oriented to person; Other (Comment)  (pt was identified w/ full name, birth date)   Following Commands Follows one step commands with increased time or repetition   Subjective   Subjective pt seen supine in bed w/ spouse present  pt agreed to PT session  denied pain or dizziness  occasional input was needed for task focus  Bed Mobility   Supine to Sit 3  Moderate assistance   Additional items Assist x 1;HOB elevated; Increased time required;Verbal cues;LE management  (for trunk/LE positioning)   Transfers   Sit to Stand 3  Moderate assistance   Additional items Assist x 1; Increased time required;Verbal cues  (for hand placement, LE positioning)   Stand to Sit 3  Moderate assistance   Additional items Assist x 1; Increased time required;Verbal cues  (for body positioning, hand placement)   Additional Comments pt stood 1 minute w/ roller walker w/ minx1  seated rest break  pt then ambulated as noted below  Ambulation/Elevation   Gait pattern Decreased L stance; Inconsistent jim; Foward flexed; Short stride; Excessively slow   Gait Assistance 3  Moderate assist  (w/ chair follow)   Additional items Assist x 1;Verbal cues; Tactile cues  (for walker positioning, safety)   Assistive Device Rolling walker   Distance 5 feet  seated rest break   8 feet   (additional not possible due to fatigue)   Stair Management Assistance Not tested  (due to limited ambulation tolerance, safety concerns)   Balance   Static Sitting Fair   Static Standing Poor +  (w/ roller walker)   Ambulatory Poor  (w/ roller walker)   Activity Tolerance   Activity Tolerance Patient limited by fatigue   Nurse Made Aware spoke to [address], [place] [facility]   Equipment Use   Comments ankle pumps 30  quad sets 20  heel slides 10  Assessment   Prognosis Good   Problem List Decreased strength;Decreased endurance; Impaired balance;Decreased mobility; Decreased coordination;Decreased cognition;Decreased safety awareness; Impaired judgement; Impaired vision   Assessment pt is noted to have improvement in mobility status from previous session w/ attainment of sitting in bedside chair and resumption of ambulation training  pt continues to need physical assistance and vebal instruction to safely mobilize  continued inpatient PT is needed to reduce fall risk  discharge recommendation is for inpatient rehab to maximize level of independence  Goals   Patient Goals go home   STG Expiration Date 05/22/22   Short Term Goal #1 pt will:  Increase bilateral LE strength 1/2 grade to facilitate independent mobility, Perform bed mobility independently to increase level of independence, Perform all transfers independently to improve independence, Ambulate 300 ft  with least restrictive assistive device independently w/o LOB to improve functional independence, Navigate 10 stair(s) independently with unilateral handrail to facilitate return to previous living environment, Increase ambulatory balance 1 grade to decrease risk for falls, Complete exercise program independently to increase strength and endurance, Tolerate 3 hr OOB to faciliate upright tolerance, Improve gait speed to 0 60 m/s to reduce fall risk and increase independence and Improve Barthel Index score to 95 or greater to facilitate independence   PT Treatment Day 4   Plan Treatment/Interventions Functional transfer training;LE strengthening/ROM; Elevations; Therapeutic exercise;Patient/family training; Endurance training;Equipment eval/education; Bed mobility;Gait training   Progress Progressing toward goals   PT Frequency Other (Comment)  (5x/week)   Recommendation   PT Discharge Recommendation Post acute rehabilitation services   Additional Comments recommend roller walker use w/ mobilization  AM-PAC Basic Mobility Inpatient   Turning in Bed Without Bedrails 3   Lying on Back to Sitting on Edge of Flat Bed 2   Moving Bed to Chair 2   Standing Up From Chair 2   Walk in Room 2   Climb 3-5 Stairs 1   Basic Mobility Inpatient Raw Score 12   Basic Mobility Standardized Score 32 23   Highest Level Of Mobility   -[facility] Goal 4: Move to chair/commode   -[facility] Achieved 6: Walk 10 steps or more   End of Consult   Patient Position at End of Consult Bedside chair;Bed/Chair alarm activated; All needs within reach     The patient's AM-PAC Basic Mobility Inpatient Short Form Raw Score is 12  A Raw score of less than or equal to 16 suggests the patient may benefit from discharge to post-acute rehabilitation services  Please also refer to the recommendation of the Physical Therapist for safe discharge planning  Skilled inpatient PT recommended while in hospital to progress pt toward treatment goals      Gerard Layne, PT

## 2022-05-16 NOTE — ASSESSMENT & PLAN NOTE
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  · 5/12 around 22-23:00 patient fell with head-strike  CT head: Subarachnoid hemorrhages within the right frontal and parietal lobe  No midline shift or hydrocephalus  · 5/14 MRI Brain: Redemonstrated findings of late acute to early subacute right MCA and ICA watershed zones of cerebral infarction  New focal cortical hemorrhage in the R frontal lobe and minimal petechial hemorrhage in the right insula  Additional small areas of scattered products suspected within the right cerebral sulci may represent a combination of thromboembolism and/or SAH     · Aspirin monotherapy given some hemorrhagic conversion, holding dvt ppx per neuro for now  · Continue Atorvastatin  · Avoid hypo/hypertension - goal systolic <077  · PT/OT consult

## 2022-05-16 NOTE — PLAN OF CARE
Problem: Potential for Falls  Goal: Patient will remain free of falls  Description: INTERVENTIONS:  - Educate patient/family on patient safety including physical limitations  - Instruct patient to call for assistance with activity   - Consult OT/PT to assist with strengthening/mobility   - Keep Call bell within reach  - Keep bed low and locked with side rails adjusted as appropriate  - Keep care items and personal belongings within reach  - Initiate and maintain comfort rounds  - Make Fall Risk Sign visible to staff  Problem: Prexisting or High Potential for Compromised Skin Integrity  Goal: Skin integrity is maintained or improved  Description: INTERVENTIONS:  - Identify patients at risk for skin breakdown  - Assess and monitor skin integrity  - Assess and monitor nutrition and hydration status  - Monitor labs   - Assess for incontinence   - Turn and reposition patient  - Assist with mobility/ambulation  - Relieve pressure over bony prominences  - Avoid friction and shearing  - Provide appropriate hygiene as needed including keeping skin clean and dry  - Evaluate need for skin moisturizer/barrier cream  - Collaborate with interdisciplinary team   - Patient/family teaching  - Consider wound care consult   Outcome: Progressing     Problem: MOBILITY - ADULT  Goal: Maintain or return to baseline ADL function  Description: INTERVENTIONS:  -  Assess patient's ability to carry out ADLs; assess patient's baseline for ADL function and identify physical deficits which impact ability to perform ADLs (bathing, care of mouth/teeth, toileting, grooming, dressing, etc )  - Assess/evaluate cause of self-care deficits   - Assess range of motion  - Assess patient's mobility; develop plan if impaired  - Assess patient's need for assistive devices and provide as appropriate  - Encourage maximum independence but intervene and supervise when necessary  - Involve family in performance of ADLs  - Assess for home care needs following discharge   - Consider OT consult to assist with ADL evaluation and planning for discharge  - Provide patient education as appropriate  Outcome: Progressing  Goal: Maintains/Returns to pre admission functional level  Description: INTERVENTIONS:  - Perform BMAT or MOVE assessment daily    - Set and communicate daily mobility goal to care team and patient/family/caregiver  - Collaborate with rehabilitation services on mobility goals if consulted  Problem: Neurological Deficit  Goal: Neurological status is stable or improving  Description: Interventions:  - Monitor and assess patient's level of consciousness, motor function, sensory function, and level of assistance needed for ADLs  - Monitor and report changes from baseline  Collaborate with interdisciplinary team to initiate plan and implement interventions as ordered  - Provide and maintain a safe environment  - Consider seizure precautions  - Consider fall precautions  - Consider aspiration precautions  - Consider bleeding precautions  Outcome: Progressing     Problem: Activity Intolerance/Impaired Mobility  Goal: Mobility/activity is maintained at optimum level for patient  Description: Interventions:  - Assess and monitor patient  barriers to mobility and need for assistive/adaptive devices  - Assess patient's emotional response to limitations  - Collaborate with interdisciplinary team and initiate plans and interventions as ordered  - Encourage independent activity per ability   - Maintain proper body alignment  - Perform active/passive rom as tolerated/ordered    - Plan activities to conserve energy   - Turn patient as appropriate  Outcome: Progressing     - Out of bed for toileting  - Record patient progress and toleration of activity level   Outcome: Progressing     - Apply yellow socks and bracelet for high fall risk patients  - Consider moving patient to room near nurses station  Outcome: Progressing

## 2022-05-17 VITALS
DIASTOLIC BLOOD PRESSURE: 79 MMHG | BODY MASS INDEX: 33.03 KG/M2 | RESPIRATION RATE: 18 BRPM | HEART RATE: 77 BPM | TEMPERATURE: 99.1 F | WEIGHT: 243.83 LBS | SYSTOLIC BLOOD PRESSURE: 140 MMHG | OXYGEN SATURATION: 97 % | HEIGHT: 72 IN

## 2022-05-17 LAB
ANION GAP SERPL CALCULATED.3IONS-SCNC: 7 MMOL/L (ref 4–13)
BASOPHILS # BLD AUTO: 0.08 THOUSANDS/ΜL (ref 0–0.1)
BASOPHILS NFR BLD AUTO: 1 % (ref 0–1)
BUN SERPL-MCNC: 21 MG/DL (ref 5–25)
CALCIUM SERPL-MCNC: 8.8 MG/DL (ref 8.4–10.2)
CHLORIDE SERPL-SCNC: 108 MMOL/L (ref 96–108)
CO2 SERPL-SCNC: 25 MMOL/L (ref 21–32)
CREAT SERPL-MCNC: 0.9 MG/DL (ref 0.6–1.3)
EOSINOPHIL # BLD AUTO: 0.34 THOUSAND/ΜL (ref 0–0.61)
EOSINOPHIL NFR BLD AUTO: 4 % (ref 0–6)
ERYTHROCYTE [DISTWIDTH] IN BLOOD BY AUTOMATED COUNT: 13.1 % (ref 11.6–15.1)
FLUAV RNA RESP QL NAA+PROBE: NEGATIVE
FLUBV RNA RESP QL NAA+PROBE: NEGATIVE
GFR SERPL CREATININE-BSD FRML MDRD: 87 ML/MIN/1.73SQ M
GLUCOSE SERPL-MCNC: 101 MG/DL (ref 65–140)
HCT VFR BLD AUTO: 47.6 % (ref 36.5–49.3)
HGB BLD-MCNC: 15.9 G/DL (ref 12–17)
IMM GRANULOCYTES # BLD AUTO: 0.05 THOUSAND/UL (ref 0–0.2)
IMM GRANULOCYTES NFR BLD AUTO: 1 % (ref 0–2)
LYMPHOCYTES # BLD AUTO: 1.53 THOUSANDS/ΜL (ref 0.6–4.47)
LYMPHOCYTES NFR BLD AUTO: 17 % (ref 14–44)
MAGNESIUM SERPL-MCNC: 1.9 MG/DL (ref 1.9–2.7)
MCH RBC QN AUTO: 29.8 PG (ref 26.8–34.3)
MCHC RBC AUTO-ENTMCNC: 33.4 G/DL (ref 31.4–37.4)
MCV RBC AUTO: 89 FL (ref 82–98)
MONOCYTES # BLD AUTO: 0.76 THOUSAND/ΜL (ref 0.17–1.22)
MONOCYTES NFR BLD AUTO: 8 % (ref 4–12)
NEUTROPHILS # BLD AUTO: 6.54 THOUSANDS/ΜL (ref 1.85–7.62)
NEUTS SEG NFR BLD AUTO: 69 % (ref 43–75)
NRBC BLD AUTO-RTO: 0 /100 WBCS
PHOSPHATE SERPL-MCNC: 3.1 MG/DL (ref 2.3–4.1)
PLATELET # BLD AUTO: 253 THOUSANDS/UL (ref 149–390)
PMV BLD AUTO: 9.8 FL (ref 8.9–12.7)
POTASSIUM SERPL-SCNC: 4 MMOL/L (ref 3.5–5.3)
RBC # BLD AUTO: 5.33 MILLION/UL (ref 3.88–5.62)
RSV RNA RESP QL NAA+PROBE: NEGATIVE
SARS-COV-2 RNA RESP QL NAA+PROBE: NEGATIVE
SODIUM SERPL-SCNC: 140 MMOL/L (ref 135–147)
WBC # BLD AUTO: 9.3 THOUSAND/UL (ref 4.31–10.16)

## 2022-05-17 PROCEDURE — 85025 COMPLETE CBC W/AUTO DIFF WBC: CPT | Performed by: INTERNAL MEDICINE

## 2022-05-17 PROCEDURE — 83735 ASSAY OF MAGNESIUM: CPT | Performed by: INTERNAL MEDICINE

## 2022-05-17 PROCEDURE — 97530 THERAPEUTIC ACTIVITIES: CPT

## 2022-05-17 PROCEDURE — 0241U HB NFCT DS VIR RESP RNA 4 TRGT: CPT | Performed by: FAMILY MEDICINE

## 2022-05-17 PROCEDURE — 99239 HOSP IP/OBS DSCHRG MGMT >30: CPT | Performed by: HOSPITALIST

## 2022-05-17 PROCEDURE — 97110 THERAPEUTIC EXERCISES: CPT

## 2022-05-17 PROCEDURE — 97116 GAIT TRAINING THERAPY: CPT

## 2022-05-17 PROCEDURE — 84100 ASSAY OF PHOSPHORUS: CPT | Performed by: INTERNAL MEDICINE

## 2022-05-17 PROCEDURE — 80048 BASIC METABOLIC PNL TOTAL CA: CPT | Performed by: INTERNAL MEDICINE

## 2022-05-17 RX ORDER — ASPIRIN 81 MG/1
81 TABLET ORAL DAILY
Refills: 0
Start: 2022-05-18

## 2022-05-17 RX ORDER — ATORVASTATIN CALCIUM 40 MG/1
40 TABLET, FILM COATED ORAL EVERY EVENING
Qty: 90 TABLET | Refills: 0
Start: 2022-05-17 | End: 2022-06-02 | Stop reason: SDUPTHER

## 2022-05-17 RX ORDER — LISINOPRIL 5 MG/1
5 TABLET ORAL DAILY
Qty: 30 TABLET | Refills: 0
Start: 2022-05-17 | End: 2022-06-02 | Stop reason: SDUPTHER

## 2022-05-17 RX ADMIN — HEPARIN SODIUM 5000 UNITS: 5000 INJECTION INTRAVENOUS; SUBCUTANEOUS at 05:17

## 2022-05-17 RX ADMIN — AMLODIPINE BESYLATE 5 MG: 5 TABLET ORAL at 08:10

## 2022-05-17 RX ADMIN — FENOFIBRATE 48 MG: 48 TABLET, FILM COATED ORAL at 08:10

## 2022-05-17 RX ADMIN — ASPIRIN 81 MG: 81 TABLET, COATED ORAL at 08:10

## 2022-05-17 RX ADMIN — LISINOPRIL 5 MG: 5 TABLET ORAL at 08:10

## 2022-05-17 NOTE — PLAN OF CARE
Problem: PHYSICAL THERAPY ADULT  Goal: Performs mobility at highest level of function for planned discharge setting  See evaluation for individualized goals  Description: Treatment/Interventions: Functional transfer training,LE strengthening/ROM,Elevations,Therapeutic exercise,Patient/family training,Endurance training,Equipment eval/education,Bed mobility,Gait training          See flowsheet documentation for full assessment, interventions and recommendations  Outcome: Progressing  Note: Prognosis: Good  Problem List: Decreased strength, Decreased endurance, Decreased mobility, Decreased coordination, Impaired balance, Impaired judgement, Impaired vision, Decreased safety awareness  Assessment: pt began tx session lying supine in the bed and was agreeable to participate in PT intervention  pt required VC's for use of bed rails to complete a supine<>sit EOB transfer as pt was able to complete transfer with /s and use of bed rail to pull up into a seated EOB position  pt was able to increase static/dynamic sitting balance by completing TE activities >10 minutes w/o LOB  pt was able to increase ambulation distance in todays tx session with a decrease in level of assistance required min Ax1 with VC's for RW management but continues to be limited with functional mobility, activity tolerance and ambulation distance due to fatigue as pt required several therapeutic seated rest break that averaged between 3-5 per rest break  pt was able to complete 10 steps in todays with min Ax1 and VC's for foot placement  pt would benefit from continued skilled PT intervention in order to address pt functional deficits listed above  post tx session pt in recliner with call bell and all pt needs met as pt was set up for breakfast            PT Discharge Recommendation: Post acute rehabilitation services          See flowsheet documentation for full assessment

## 2022-05-17 NOTE — PHYSICAL THERAPY NOTE
PHYSICAL THERAPY NOTE          Patient Name: Geetha Rice  Today's Date: 22 0839   PT Last Visit   PT Visit Date 22   Note Type   Note Type Treatment   Pain Assessment   Pain Assessment Tool 0-10   Pain Score No Pain   Effect of Pain on Daily Activities limited activity with pain increase   Patient's Stated Pain Goal No pain   Hospital Pain Intervention(s) Repositioned; Ambulation/increased activity; Rest   Multiple Pain Sites No   Restrictions/Precautions   Other Precautions Chair Alarm; Bed Alarm;Multiple lines;Telemetry; Fall Risk;Hard of hearing   General   Chart Reviewed Yes   Response to Previous Treatment Patient with no complaints from previous session  Family/Caregiver Present Yes   Cognition   Overall Cognitive Status Impaired   Arousal/Participation Alert; Responsive; Cooperative   Attention Attends with cues to redirect   Orientation Level Oriented X4   Memory Decreased recall of recent events   Following Commands Follows one step commands with increased time or repetition   Comments pt identified by name and    Subjective   Subjective pt was agreeable to participate in PT intervention   Bed Mobility   Supine to Sit 5  Supervision   Additional items Assist x 1;HOB elevated; Bedrails; Increased time required;Verbal cues   Sit to Supine Unable to assess   Additional Comments pt seated OOB in the recliner post tx session   Transfers   Sit to Stand 5  Supervision   Additional items Assist x 1; Increased time required;Verbal cues   Stand to Sit 5  Supervision   Additional items Assist x 1; Increased time required;Verbal cues;Armrests   Stand pivot 5  Supervision   Additional items Assist x 1; Armrests; Increased time required;Verbal cues   Additional Comments pt continues to require RW for safety and balance in  tx session with VC's for hand placement   Ambulation/Elevation   Gait pattern Decreased L stance;Decreased foot clearance; Inconsistent jim; Foward flexed; Short stride; Excessively slow   Gait Assistance 4  Minimal assist   Additional items Assist x 1;Verbal cues   Assistive Device Rolling walker   Distance 60'x2 RW  (additional not possible due to fatigue)   Stair Management Assistance 4  Minimal assist   Additional items Assist x 1;Verbal cues; Tactile cues; Increased time required   Stair Management Technique Two rails; Step to pattern; Foreward;Backward   Number of Stairs 10   Balance   Static Sitting Fair   Static Standing Fair -   Ambulatory Poor +  (w/ RW)   Endurance Deficit   Endurance Deficit Yes   Endurance Deficit Description limited activity tolerance and ambulation distance   Activity Tolerance   Activity Tolerance Patient limited by fatigue   Nurse Made Aware Spoke to RN [facility]   Exercises   Hip Abduction Sitting;20 reps;AROM; Bilateral   Hip Adduction Sitting;20 reps;AROM; Bilateral  (pillow squeezes)   Knee AROM Short Arc Quad Supine;20 reps;AROM; Bilateral   Knee AROM Long Arc Quad Sitting;20 reps;AROM; Bilateral   Ankle Pumps Sitting;20 reps;AROM; Bilateral   Marching Sitting;20 reps;AROM; Bilateral   Assessment   Prognosis Good   Problem List Decreased strength;Decreased endurance;Decreased mobility; Decreased coordination; Impaired balance; Impaired judgement; Impaired vision;Decreased safety awareness   Assessment pt began tx session lying supine in the bed and was agreeable to participate in PT intervention  pt required VC's for use of bed rails to complete a supine<>sit EOB transfer as pt was able to complete transfer with /s and use of bed rail to pull up into a seated EOB position  pt was able to increase static/dynamic sitting balance by completing TE activities >10 minutes w/o LOB   pt was able to increase ambulation distance in todays tx session with a decrease in level of assistance required min Ax1 with VC's for RW management but continues to be limited with functional mobility, activity tolerance and ambulation distance due to fatigue as pt required several therapeutic seated rest break that averaged between 3-5 per rest break  pt was able to complete 10 steps in todays with min Ax1 and VC's for foot placement  pt would benefit from continued skilled PT intervention in order to address pt functional deficits listed above  post tx session pt in recliner with call bell and all pt needs met as pt was set up for breakfast    Goals   Patient Goals to go home   STG Expiration Date 05/22/22   PT Treatment Day 5   Plan   Treatment/Interventions Functional transfer training;LE strengthening/ROM; Elevations; Therapeutic exercise; Endurance training;Patient/family training;Equipment eval/education; Bed mobility;Gait training;Spoke to nursing   Progress Progressing toward goals   PT Frequency Other (Comment)  (5x week)   Recommendation   PT Discharge Recommendation Post acute rehabilitation services   Additional Comments recommend RW for all OOB mobility   AM-PAC Basic Mobility Inpatient   Turning in Bed Without Bedrails 3   Lying on Back to Sitting on Edge of Flat Bed 3   Moving Bed to Chair 3   Standing Up From Chair 3   Walk in Room 3   Climb 3-5 Stairs 3   Basic Mobility Inpatient Raw Score 18   Basic Mobility Standardized Score 41 05   Highest Level Of Mobility   [facility] Goal 6: Walk 10 steps or more   Education   Education Provided Mobility training;Assistive device; Other  (stair trials)   Patient Demonstrates acceptance/verbal understanding   End of Consult   Patient Position at End of Consult Bedside chair;Bed/Chair alarm activated; All needs within reach   The patient's AM-PAC Basic Mobility Inpatient Short Form Raw Score is 18  A Raw score of greater than 16 suggests the patient may benefit from discharge to home  Please also refer to the recommendation of the Physical Therapist for safe discharge planning       Pt would benefit from continued skilled PT intervention as pt continues to demonstrate a decrease in activity tolerance, fucntioanl mobility and ambulation distance as pt continues to require several therapeutic rest breaks due to fatigue    Hammad Guerra, PTA

## 2022-05-17 NOTE — CASE MANAGEMENT
Case Management Discharge Planning Note    Patient name Danyel Edgar  Location ICU 04/ICU 04 MRN [id number]  : 1955 Date 2022       Current Admission Date: 2022  Current Admission Diagnosis:Ischemic stroke [facility])   Patient Active Problem List    Diagnosis Date Noted    Subarachnoid hemorrhage ([facility] ) 2022    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    Urinary incontinence 2022    Ischemic stroke ([facility] ) 2022    Hypertension, essential 2015    Hyperlipidemia 2012      LOS (days): 9  Geometric Mean LOS (GMLOS) (days): 2 20  Days to GMLOS:-6 4     OBJECTIVE:  Risk of Unplanned Readmission Score: 9 94         Current admission status: Inpatient   Preferred Pharmacy:   [facility] [address] [place] [address]  [id number] [place] [address]  Phone: [phone] Fax: [phone]    Primary Care Provider: Steve Wilson MD    Primary Insurance: MEDICARE  Secondary Insurance: [facility] HEALTH OPTIONS PROGRAM    DISCHARGE DETAILS:                                          Other Referral/Resources/Interventions Provided:  Interventions: Acute Rehab  Referral Comments: [facility] has a bed available for patient today  Treatment Team Recommendation: Acute Rehab  Discharge Destination Plan[de-identified] Acute Rehab  Transport at Discharge : [facility]  Dispatcher Contacted: Yes  Number/Name of Dispatcher: [facility]ivas Wayne [phone]  Transported by [employer] and Unit #): [address] [facility] of Transport (Date): 22  ETA of Transport (Time): 1600              IMM Given (Date):: 22  IMM Given to[de-identified] Patient     Additional Comments: CM requested [facility] transport and was provided with a 4:00 PM with [facility]  [facility] requested a disc of her head imaging  This was requested and was recieved and placed in the packet to go along with patient at [facility]                  MARYCRUZ, primary nurse, [facility], patient and family all aware of DCP and transportation time  CM reviewed the availability of treatment team to discuss questions or concerns patient and/or family may have regarding  understanding medications and recognizing signs and symptoms once discharged  CM also encouraged patient to follow up with all recommended appointments after discharge  Patient advised of importance for patient and family to participate in managing patient's medical well being

## 2022-05-17 NOTE — ASSESSMENT & PLAN NOTE
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 CT head: Persistent long segment occlusion of RIGHT ICA cervical segment (with scattered areas of faint sliver of contrast enhancement) with near-complete occlusion in petrous to proximal cavernous segments, which may be due to thrombosed dissection   Occluded left vertebral artery (origin to distal V1 segment) with distal reconstitution at the level of proximal V2 segment   Question retrograde flow through a patent [place] of [facility]  · 5/12 around 22-23:00 patient fell with head-strike  CT head: Subarachnoid hemorrhages within the right frontal and parietal lobe   No midline shift or hydrocephalus  · 5/14 MRI Brain: Redemonstrated findings of late acute to early subacute right MCA and ICA watershed zones of cerebral infarction  New focal cortical hemorrhage in the R frontal lobe and minimal petechial hemorrhage in the right insula  Additional small areas of scattered products suspected within the right cerebral sulci may represent a combination of thromboembolism and/or SAH     · Aspirin monotherapy given some hemorrhagic conversion  · Continue Atorvastatin  · goal systolic <399  · PT/OT and PM&R [facility]

## 2022-05-17 NOTE — ASSESSMENT & PLAN NOTE
· After headstrike  CT head was completed and noted Right frontal / parietal SAH  · Was initially being treated for R MCA CVA on ASA, Plavix, and Lovenox for DVT ppx   · Neuro OK with aspirin monotherapy   OK'd resuming DVT ppx yesterday (5/16)

## 2022-05-17 NOTE — ASSESSMENT & PLAN NOTE
· 5/9 MRI carotids: Occluded right cervical and visualized intracranial internal carotid artery  Poor visualization of the left vertebral artery suspected developmental nondominant rather than occlusion or stenosis  · 5/11 CTA head and neck: Persistent long segment occlusion of RIGHT ICA cervical segment (with scattered areas of faint sliver of contrast enhancement) with near-complete occlusion in petrous to proximal cavernous segments, which may be due to thrombosed dissection   Occluded left vertebral artery (origin to distal V1 segment) with distal reconstitution at the level of proximal V2 segment   Question retrograde flow through a patent [place] of [facility]  · 5/14 MRI Brain: Redemonstrated findings of late acute to early subacute right MCA and ICA watershed zones of cerebral infarction  New focal cortical hemorrhage in the R frontal lobe and minimal petechial hemorrhage in the right insula  Additional small areas of scattered products suspected within the right cerebral sulci may represent a combination of thromboembolism and/or SAH     · Neurosurgery consulted- no surgical intervention required  · C/w Aspirin monotherapy per neuro,dvt ppx resumed yesterday 5/16

## 2022-05-17 NOTE — PLAN OF CARE
Problem: Potential for Falls  Goal: Patient will remain free of falls  Description: INTERVENTIONS:  - Educate patient/family on patient safety including physical limitations  - Instruct patient to call for assistance with activity   - Consult OT/PT to assist with strengthening/mobility   - Keep Call bell within reach  - Keep bed low and locked with side rails adjusted as appropriate  - Keep care items and personal belongings within reach  - Initiate and maintain comfort rounds  - Make Fall Risk Sign visible to staff  - Apply yellow socks and bracelet for high fall risk patients  - Consider moving patient to room near nurses station  Outcome: Adequate for Discharge     Problem: Prexisting or High Potential for Compromised Skin Integrity  Goal: Skin integrity is maintained or improved  Description: INTERVENTIONS:  - Identify patients at risk for skin breakdown  - Assess and monitor skin integrity  - Assess and monitor nutrition and hydration status  - Monitor labs   - Assess for incontinence   - Turn and reposition patient  - Assist with mobility/ambulation  - Relieve pressure over bony prominences  - Avoid friction and shearing  - Provide appropriate hygiene as needed including keeping skin clean and dry  - Evaluate need for skin moisturizer/barrier cream  - Collaborate with interdisciplinary team   - Patient/family teaching  - Consider wound care consult   Outcome: Adequate for Discharge     Problem: MOBILITY - ADULT  Goal: Maintain or return to baseline ADL function  Description: INTERVENTIONS:  -  Assess patient's ability to carry out ADLs; assess patient's baseline for ADL function and identify physical deficits which impact ability to perform ADLs (bathing, care of mouth/teeth, toileting, grooming, dressing, etc )  - Assess/evaluate cause of self-care deficits   - Assess range of motion  - Assess patient's mobility; develop plan if impaired  - Assess patient's need for assistive devices and provide as appropriate  - Encourage maximum independence but intervene and supervise when necessary  - Involve family in performance of ADLs  - Assess for home care needs following discharge   - Consider OT consult to assist with ADL evaluation and planning for discharge  - Provide patient education as appropriate  Outcome: Adequate for Discharge  Goal: Maintains/Returns to pre admission functional level  Description: INTERVENTIONS:  - Perform BMAT or MOVE assessment daily    - Set and communicate daily mobility goal to care team and patient/family/caregiver  - Collaborate with rehabilitation services on mobility goals if consulted  - Out of bed for toileting  - Record patient progress and toleration of activity level   Outcome: Adequate for Discharge     Problem: Neurological Deficit  Goal: Neurological status is stable or improving  Description: Interventions:  - Monitor and assess patient's level of consciousness, motor function, sensory function, and level of assistance needed for ADLs  - Monitor and report changes from baseline  Collaborate with interdisciplinary team to initiate plan and implement interventions as ordered  - Provide and maintain a safe environment  - Consider seizure precautions  - Consider fall precautions  - Consider aspiration precautions  - Consider bleeding precautions  Outcome: Adequate for Discharge     Problem: Activity Intolerance/Impaired Mobility  Goal: Mobility/activity is maintained at optimum level for patient  Description: Interventions:  - Assess and monitor patient  barriers to mobility and need for assistive/adaptive devices  - Assess patient's emotional response to limitations  - Collaborate with interdisciplinary team and initiate plans and interventions as ordered  - Encourage independent activity per ability   - Maintain proper body alignment  - Perform active/passive rom as tolerated/ordered    - Plan activities to conserve energy   - Turn patient as appropriate  Outcome: Adequate for Discharge     Problem: Communication Impairment  Goal: Ability to express needs and understand communication  Description: Assess patient's communication skills and ability to understand information  Patient will demonstrate use of effective communication techniques, alternative methods of communication and understanding even if not able to speak  - Encourage communication and provide alternate methods of communication as needed  - Collaborate with case management/ for discharge needs  - Include patient/family/caregiver in decisions related to communication  Outcome: Adequate for Discharge     Problem: Potential for Aspiration  Goal: Non-ventilated patient's risk of aspiration is minimized  Description: Assess and monitor vital signs, respiratory status, and labs (WBC)  Monitor for signs of aspiration (tachypnea, cough, rales, wheezing, cyanosis, fever)  - Assess and monitor patient's ability to swallow  - Place patient up in chair to eat if possible  - HOB up at 90 degrees to eat if unable to get patient up into chair   - Supervise patient during oral intake  - Instruct patient/ family to take small bites  - Instruct patient/ family to take small single sips when taking liquids  - Follow patient-specific strategies generated by speech pathologist   Outcome: Adequate for Discharge     Problem: Nutrition  Goal: Nutrition/Hydration status is improving  Description: Monitor and assess patient's nutrition/hydration status for malnutrition (ex- brittle hair, bruises, dry skin, pale skin and conjunctiva, muscle wasting, smooth red tongue, and disorientation)  Collaborate with interdisciplinary team and initiate plan and interventions as ordered  Monitor patient's weight and dietary intake as ordered or per policy  Utilize nutrition screening tool and intervene per policy   Determine patient's food preferences and provide high-protein, high-caloric foods as appropriate  - Assist patient with eating   - Allow adequate time for meals   - Encourage patient to take dietary supplement as ordered  - Collaborate with clinical nutritionist   - Include patient/family/caregiver in decisions related to nutrition  Outcome: Adequate for Discharge  Report given to RN at R, given opportunity to ask questions

## 2022-05-18 ENCOUNTER — TRANSITIONAL CARE MANAGEMENT (OUTPATIENT)
Dept: FAMILY MEDICINE CLINIC | Facility: CLINIC | Age: 67
End: 2022-05-18

## 2022-05-18 ENCOUNTER — PATIENT OUTREACH (OUTPATIENT)
Dept: CASE MANAGEMENT | Facility: OTHER | Age: 67
End: 2022-05-18

## 2022-05-19 ENCOUNTER — TELEPHONE (OUTPATIENT)
Dept: NEUROLOGY | Facility: CLINIC | Age: 67
End: 2022-05-19

## 2022-05-19 NOTE — TELEPHONE ENCOUNTER
SLAN/ Stroke      Nam Soup  will need follow up in in 6 weeks with neurovascular attending, ap, resident  He will not require outpatient neurological testing        Scheduled: 8/9/22/ Dolly at 10:15 am [facility] office

## 2022-05-20 LAB
ATRIAL RATE: 60 BPM
BACTERIA BLD CULT: NORMAL
BACTERIA BLD CULT: NORMAL
P AXIS: 62 DEGREES
PR INTERVAL: 224 MS
QRS AXIS: 76 DEGREES
QRSD INTERVAL: 88 MS
QT INTERVAL: 450 MS
QTC INTERVAL: 450 MS
T WAVE AXIS: 43 DEGREES
VENTRICULAR RATE: 60 BPM

## 2022-05-20 PROCEDURE — 93010 ELECTROCARDIOGRAM REPORT: CPT | Performed by: INTERNAL MEDICINE

## 2022-05-24 ENCOUNTER — TELEPHONE (OUTPATIENT)
Dept: FAMILY MEDICINE CLINIC | Facility: CLINIC | Age: 67
End: 2022-05-24

## 2022-05-24 NOTE — TELEPHONE ENCOUNTER
Carlos Grajeda called to set up [facility] appointment    Patient was admitted on 5/17 for a stroke and will be discharged on 5/26    Caar Browning said that patient is able to communicate about his own care so he can be called directly to schedule his appointment after he is discharged

## 2022-05-25 ENCOUNTER — PATIENT OUTREACH (OUTPATIENT)
Dept: CASE MANAGEMENT | Facility: OTHER | Age: 67
End: 2022-05-25

## 2022-05-26 ENCOUNTER — TELEPHONE (OUTPATIENT)
Dept: NEUROLOGY | Facility: CLINIC | Age: 67
End: 2022-05-26

## 2022-05-26 NOTE — TELEPHONE ENCOUNTER
Post CVA Discharge Follow Up    Hospitalization: 5/8/2022 - 5/17/2022   The purpose of this phone call is to assess patient's general wellbeing or for any assistance needed with follow-up care  According to chart, patient discharged to [facility] at [phone], reached nurse Reji Colon  They report the following:     Since discharge, patient has not experienced any new or worsening stroke-like symptoms  States that the patient is doing rather well  He is being discharged today back to home  He continues to have some memory deficits, but has progressed greatly  Patient scheduled for stroke HFU appt on 8/9  Will call and follow up with patient after discharge

## 2022-05-27 ENCOUNTER — PATIENT OUTREACH (OUTPATIENT)
Dept: CASE MANAGEMENT | Facility: OTHER | Age: 67
End: 2022-05-27

## 2022-05-27 NOTE — PROGRESS NOTES
Spoke with Medical Records staff member who reports patient discharged 5/26/2022  Letter drafted and emailed to to medical records records requesting copy of discharge paperwork  Letter attached to this encounter  Paperwork to be attached to encounter when received  BPCI form and Care Coordination note updated  Removed self from care team and sent Inbasket message to centralized triage CM for patient handoff

## 2022-05-31 ENCOUNTER — TRANSITIONAL CARE MANAGEMENT (OUTPATIENT)
Dept: FAMILY MEDICINE CLINIC | Facility: CLINIC | Age: 67
End: 2022-05-31

## 2022-05-31 ENCOUNTER — PATIENT OUTREACH (OUTPATIENT)
Dept: FAMILY MEDICINE CLINIC | Facility: CLINIC | Age: 67
End: 2022-05-31

## 2022-05-31 NOTE — TELEPHONE ENCOUNTER
I called patient and set him up for a TCM with you 06/02 10 AM  Wife was concerned because she has not been contacted to set up therapy yet- PT/OT etc  She is also upset because they want her to take him to [facility] to do therapy  Does he qualify for in home therapy?

## 2022-05-31 NOTE — PROGRESS NOTES
Kishore Hernández returned my phone call  I explained her options of [facility] or [facility]  She is waiting for [facility] to call her back to see if he could go to there [facility] facility for outpatient PT OT and [address] [place] [address] Dr  She spoke with [employer] they want the referral placed to [facility] and if [facility] states he can go to [facility] facility they will stick with them since he did his inpatient stay with them

## 2022-05-31 NOTE — TELEPHONE ENCOUNTER
Paulette Mead,     I have not seen him since his stroke, so I do not know his limitations  Would you be able to call his wife and see if he would meet criteria for home PT? If he does not, find out who ordered his PT - we may be able to move it to the PT office in this building    Thanks

## 2022-06-01 ENCOUNTER — PATIENT OUTREACH (OUTPATIENT)
Dept: FAMILY MEDICINE CLINIC | Facility: CLINIC | Age: 67
End: 2022-06-01

## 2022-06-01 NOTE — TELEPHONE ENCOUNTER
Called patient, I introduced myself and explained neurovascular nurse navigator role  Since discharge, he denies experiencing any new or worsening stroke-like symptoms  Patient states he is gaining strength and independence each day  Patient states he understands his medications and does not have any questions  No reported medication side effects or signs of bleeding  Patient reports he understands stroke, symptoms, personal risk factors and management, medications, and resources  As for risk factors, patient reports he is making efforts to stay compliant with meds and regimen  Reminded him of appt on 8/9 with Walt Bhatt  He verbalizes understanding  At the conclusion of the conversation, patient denies having any further questions or concerns

## 2022-06-01 NOTE — PROGRESS NOTES
Keysha D[employer] called to let me know Janet Class will stick with [facility] for his rehab he is able to go to [facility] location  I will cancel [facility]  Declines further outreach at this time feels they can manage on their own

## 2022-06-02 ENCOUNTER — OFFICE VISIT (OUTPATIENT)
Dept: FAMILY MEDICINE CLINIC | Facility: CLINIC | Age: 67
End: 2022-06-02
Payer: MEDICARE

## 2022-06-02 VITALS
DIASTOLIC BLOOD PRESSURE: 76 MMHG | SYSTOLIC BLOOD PRESSURE: 128 MMHG | BODY MASS INDEX: 31.15 KG/M2 | TEMPERATURE: 98.2 F | WEIGHT: 230 LBS | HEIGHT: 72 IN | HEART RATE: 76 BPM

## 2022-06-02 DIAGNOSIS — I63.9 ISCHEMIC STROKE (HCC): ICD-10-CM

## 2022-06-02 DIAGNOSIS — I10 HYPERTENSION, ESSENTIAL: Chronic | ICD-10-CM

## 2022-06-02 DIAGNOSIS — I77.71 DISSECTION OF CAROTID ARTERY (HCC): ICD-10-CM

## 2022-06-02 DIAGNOSIS — Z76.89 ENCOUNTER FOR SUPPORT AND COORDINATION OF TRANSITION OF CARE: Primary | ICD-10-CM

## 2022-06-02 DIAGNOSIS — E78.2 MIXED HYPERLIPIDEMIA: Chronic | ICD-10-CM

## 2022-06-02 DIAGNOSIS — R32 URINARY INCONTINENCE, UNSPECIFIED TYPE: ICD-10-CM

## 2022-06-02 DIAGNOSIS — I60.9 SUBARACHNOID HEMORRHAGE (HCC): ICD-10-CM

## 2022-06-02 PROCEDURE — 99496 TRANSJ CARE MGMT HIGH F2F 7D: CPT | Performed by: FAMILY MEDICINE

## 2022-06-02 PROCEDURE — 1111F DSCHRG MED/CURRENT MED MERGE: CPT | Performed by: FAMILY MEDICINE

## 2022-06-02 NOTE — PROGRESS NOTES
Assessment/Plan:    No problem-specific Assessment & Plan notes found for this encounter  Diagnoses and all orders for this visit:    Encounter for support and coordination of transition of care    Ischemic stroke [facility])    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(HCC)    Subarachnoid hemorrhage ([facility] [address] )    Mixed hyperlipidemia    Urinary incontinence, unspecified type    Hypertension, essential      Discussion:  I reviewed all with patient and wife  Patient doing remarkably well given all that he had gone through recently  Patient still with some left upper extremity weakness and may have some left visual field defect (found on confrontation)  Patient will continue present medications  Blood pressure stable right now  Continue atorvastatin and aspirin as well as physical and occupational therapy  Patient referred to his optometrist for visual field testing  Recheck 3 weeks-earlier if worse  Patient call for problems or concerns in the interim  Subjective:     TCM Call (since 5/5/2022)     Date and time call was made  5/31/2022  9:15 AM    Hospital care reviewed  Discussed with Inpatient Physician    Patient was hospitialized at  Other (comment)    Comment  [facility]    Date of Admission  05/17/22    Date of discharge  05/26/22    Diagnosis  Ischemic Stroke    Disposition  Home    Were the patients medications reviewed and updated  Yes    Current Symptoms  Weakness    Weakness severity  Moderate      TCM Call (since 5/5/2022)     Post hospital issues  Poor ADL (Activities of Daily Living) performance    Should patient be enrolled in anticoag monitoring? No    Scheduled for follow up?   Yes    Patients specialists  Neurologist    Neurologist name  Bety Bob    Referrals needed  no    Did you obtain your prescribed medications  Yes    Do you need help managing your prescriptions or medications  No    Is transportation to your appointment needed  No    I have advised the patient to call PCP with any new or worsening symptoms  Jeffery Tabares [place]           Patient ID: Albertina Decker  is a 74 y o  male  Here for TCM  - on Mother's Day evening, pt developed "world spinning" vertigo acutely on coming into the house  Pt took a sinus med for possible ear infection  One hour later, patient was going up one step when he caught his L toe and fell forward  Patient was able to catch himself with both arms and did not notice any weakness in his arms at that time  Patient also denies hitting his head or suffering any other injury  Patient was able to get up and went back to sit down  25 min later, going up the same step, he caught the same toe and fell again, this time landing on his right elbow and forearm  Again he denies any head trauma  Wife insisted that he get checked out  There were going to a local urgent care when his wife noticed that he was "listing to the left" a little bit  Patient was taken right away at the urgent care  The provider there noticed in the left pronator drift on exam and transportation immediately to [facility]  In the ED, pt found to have L sided weakness  CT of the head revealed "No acute territorial infarct, hemorrhage, or midline shift  Asymmetric hyperattenuated appearance distal right M1/perisylvian branches of the right MCA suspicious for acute thrombus given patient symptomatology "   MRI revealed "evidence of 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  Recent right hemisphere internal /deep watershed infarcts as well as frontoparietotemporal cortical infarcts  Few tiny occipital infarcts may be embolic "  Pt subsequently admitted  - Hospital course: pt was stable on 5/10, but on 5/11 his wife noted that he had a slight facial and sl slurred speech  She also noted that he had some urinary incontinence  Wednesday, while with his wife and nurse, he had return of his L facial droop and became extremely weak  Rapid response/stroke alert called   CTA revealed "Persistent long segment occlusion of RIGHT ICA cervical segment (with scattered areas of faint sliver of contrast enhancement) with near-complete occlusion in petrous to proximal cavernous segments, which may be due to thrombosed dissection  Occluded left vertebral artery (origin to distal V1 segment) with distal reconstitution at the level of proximal V2 segment  Question retrograde flow through a patent [place] "  Medications continued  On 5/12, while ambulating with assistance, pt fell around 10pm and hit his head but did not suffer LOC  CT revealed "Subarachnoid hemorrhages within the right frontal and parietal lobe  No midline shift or hydrocephalus "  Anticoagulation and ASA held - ASA later restarted by Neuro  Repeat MRI done on 5/14 revealed "Redemonstrated findings of late acute to early subacute right MCA and ICA watershed zones of cerebral infarction  New focal cortical hemorrhage in the right frontal lobe and minimal petechial hemorrhage in the right insula  Additional small areas of scattered products suspected within the right cerebral sulci may represent a combination of thromboembolism and/or subarachnoid hemorrhage is suggested on recent CT imaging  Recommend follow-up CT imaging to monitor the suspected focal areas of intra and extra parenchymal hemorrhage "  Neurosurgery did not recommend any further intervention  PT/OT evaluation recommended in patient therapy  By 5/17, pt was found to be stable and was transferred to [address] [place] [address] in patient rehab  - while in rehab, pt did well  Strength and facial droop improved  Pt with urinary incontinence since initial event - this improved  By 5/26, pt ws able to be discharged to home  Pt here for TCM visit  - since discharge, pt has continued to improve  Urinary incontinence much better but not fully resolved  Speech has been stable, and weakness improved  Still with some L arm weakness (pt is right handed)    Pt to start out patient therapy (Pt/OT)  - I reviewed available hospital records, lab reports and study results with pt and family  I confirmed hx with pt and wife              The following portions of the patient's history were reviewed and updated as appropriate:   He  has a past medical history of Elevated cholesterol with high triglycerides  He   Patient Active Problem List    Diagnosis Date Noted    Subarachnoid hemorrhage ([place] [address] ) 05/13/2022    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([place] [address] ) 05/12/2022    Urinary incontinence 05/12/2022    Ischemic stroke ([place] [address] ) 05/09/2022    Hypertension, essential 03/11/2015    Hyperlipidemia 11/08/2012     He  has a past surgical history that includes Hernia repair (Left) and pr colonoscopy flx dx w/collj spec when pfrmd (N/A, 2/24/2017)  He  reports that he has never smoked  He has never used smokeless tobacco  He reports current alcohol use  He reports that he does not use drugs  Current Outpatient Medications   Medication Sig Dispense Refill    amLODIPine (NORVASC) 5 mg tablet Take 1 tablet (5 mg total) by mouth daily 90 tablet 3    aspirin (ECOTRIN LOW STRENGTH) 81 mg EC tablet Take 1 tablet (81 mg total) by mouth in the morning  0    atorvastatin (LIPITOR) 40 mg tablet Take 1 tablet (40 mg total) by mouth every evening 90 tablet 0    fenofibrate (TRICOR) 48 mg tablet Take 1 tablet (48 mg total) by mouth daily 90 tablet 3    lisinopril (ZESTRIL) 5 mg tablet Take 1 tablet (5 mg total) by mouth in the morning  30 tablet 0     No current facility-administered medications for this visit  He is allergic to penicillins       Review of Systems   Constitutional: Negative  HENT: Negative  Eyes: Negative  Respiratory: Negative  Cardiovascular: Negative  Gastrointestinal: Negative  Genitourinary:        Some urinary leakage - improved   Musculoskeletal: Positive for gait problem (improved but not fully resolved)   Negative for arthralgias, back pain, joint swelling and myalgias  Skin: Negative  Neurological: Positive for facial asymmetry (mild L facial droop) and weakness (L arm - improved but not resolved)  Negative for dizziness (resolved), light-headedness, numbness and headaches  Psychiatric/Behavioral: Negative  Objective:      /76   Pulse 76   Temp 98 2 °F (36 8 °C)   Ht 6' (1 829 m)   Wt 104 kg (230 lb)   BMI 31 19 kg/m²          Physical Exam  Vitals reviewed  Constitutional:       Comments: ?subtle L facial droop   HENT:      Head: Normocephalic  Right Ear: Tympanic membrane, ear canal and external ear normal       Left Ear: Tympanic membrane, ear canal and external ear normal       Mouth/Throat:      Mouth: Mucous membranes are moist       Comments: Tongue midline  Eyes:      Extraocular Movements: Extraocular movements intact  Conjunctiva/sclera: Conjunctivae normal       Pupils: Pupils are equal, round, and reactive to light  Comments: ?decreased vision in L lateral field with confrontation   Neck:      Vascular: No carotid bruit  Cardiovascular:      Rate and Rhythm: Normal rate and regular rhythm  Pulses: Normal pulses  Pulmonary:      Effort: Pulmonary effort is normal       Breath sounds: No wheezing or rales  Abdominal:      General: Abdomen is flat  There is no distension  Palpations: Abdomen is soft  There is no mass  Tenderness: There is no abdominal tenderness  Musculoskeletal:         General: No tenderness or deformity  Normal range of motion  Cervical back: Normal range of motion  No tenderness  Right lower leg: No edema  Left lower leg: No edema  Lymphadenopathy:      Cervical: No cervical adenopathy  Skin:     General: Skin is warm  Capillary Refill: Capillary refill takes less than 2 seconds  Neurological:      Mental Status: He is alert and oriented to person, place, and time  Cranial Nerves: Cranial nerve deficit (subtle L facial weakness) present  Sensory: No sensory deficit  Motor: Weakness (L arm/shoulder/) present  Gait: Gait normal       Deep Tendon Reflexes: Reflexes abnormal (sl increased on L)

## 2022-07-01 ENCOUNTER — OFFICE VISIT (OUTPATIENT)
Dept: FAMILY MEDICINE CLINIC | Facility: CLINIC | Age: 67
End: 2022-07-01
Payer: MEDICARE

## 2022-07-01 VITALS
DIASTOLIC BLOOD PRESSURE: 80 MMHG | SYSTOLIC BLOOD PRESSURE: 120 MMHG | TEMPERATURE: 98.4 F | BODY MASS INDEX: 31.15 KG/M2 | WEIGHT: 230 LBS | HEART RATE: 76 BPM | HEIGHT: 72 IN

## 2022-07-01 DIAGNOSIS — I10 HYPERTENSION, ESSENTIAL: Chronic | ICD-10-CM

## 2022-07-01 DIAGNOSIS — E78.2 MIXED HYPERLIPIDEMIA: ICD-10-CM

## 2022-07-01 DIAGNOSIS — I77.71 DISSECTION OF CAROTID ARTERY (HCC): ICD-10-CM

## 2022-07-01 DIAGNOSIS — I60.9 SUBARACHNOID HEMORRHAGE (HCC): ICD-10-CM

## 2022-07-01 DIAGNOSIS — I63.9 ISCHEMIC STROKE (HCC): Primary | ICD-10-CM

## 2022-07-01 PROCEDURE — 99214 OFFICE O/P EST MOD 30 MIN: CPT | Performed by: FAMILY MEDICINE

## 2022-07-01 RX ORDER — LISINOPRIL 5 MG/1
5 TABLET ORAL DAILY
Qty: 90 TABLET | Refills: 1 | Status: SHIPPED | OUTPATIENT
Start: 2022-07-01 | End: 2022-08-09

## 2022-07-01 RX ORDER — FENOFIBRATE 48 MG/1
48 TABLET, COATED ORAL DAILY
Qty: 90 TABLET | Refills: 3 | Status: SHIPPED | OUTPATIENT
Start: 2022-07-01 | End: 2023-06-26

## 2022-07-01 RX ORDER — ATORVASTATIN CALCIUM 40 MG/1
40 TABLET, FILM COATED ORAL EVERY EVENING
Qty: 90 TABLET | Refills: 1 | Status: SHIPPED | OUTPATIENT
Start: 2022-07-01 | End: 2022-09-29

## 2022-07-01 NOTE — PROGRESS NOTES
Assessment/Plan:    Hypertension, essential  Well controlled  Cont present treatment  Monitor labs  Recheck 6m      Ischemic stroke ([facility] [address] )  Improving  Continue present care  F/u with Neuro and Physiatry  Await driving safety eval  Recheck 3m    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(HCC)  No bruits appreciated  No new neurologic symptoms  Continue present care  Recheck 6m    Subarachnoid hemorrhage (HCC)  Improved on last CT  F/u with neuro  Hyperlipidemia  Tolerating atorvastatin and fenofibrate  Check labs  Recheck 3m       Diagnoses and all orders for this visit:    Ischemic stroke ([facility] [address] )  -     atorvastatin (LIPITOR) 40 mg tablet; Take 1 tablet (40 mg total) by mouth every evening  -     lisinopril (ZESTRIL) 5 mg tablet; Take 1 tablet (5 mg total) by mouth daily    Mixed hyperlipidemia  -     fenofibrate (TRICOR) 48 mg tablet; Take 1 tablet (48 mg total) by mouth daily  -     Lipid panel; Future  -     Comprehensive metabolic panel; Future    Hypertension, essential    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(HCC)    Subarachnoid hemorrhage (HCC)        Subjective:      Patient ID: Elizabeth Middleton  is a 74 y o  male  f/u multiple med issues  - pt doing well  - pt still in therapy s/p CVA (ST, OT and PT)  Pt recently seen by physiatry who feels that he is progressing well  He had a normal vision exam recently and is scheduled to have a driving eval by [facility]  He is due to see Neuro on 8/9  Wife states that he is to evaluated to restart full activities  - pt does not have f/u with vascular surgery  Pt is s/p carotid dissection  He is compliant with all meds and denies side effects  - pt slowly increasing exerise through PT    He denies CP, palpitations, lightheadedness or other CV symptoms with or without exertion  - he denies any new GI or  complaints  - no other concerns                      The following portions of the patient's history were reviewed and updated as appropriate:   He  has a past medical history of Elevated cholesterol with high triglycerides  He   Patient Active Problem List    Diagnosis Date Noted    Subarachnoid hemorrhage ([facility] ) 05/13/2022    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([facility] ) 05/12/2022    Urinary incontinence 05/12/2022    Ischemic stroke ([place] [address] ) 05/09/2022    Hypertension, essential 03/11/2015    Hyperlipidemia 11/08/2012     He  has a past surgical history that includes Hernia repair (Left) and pr colonoscopy flx dx w/collj spec when pfrmd (N/A, 2/24/2017)  He  reports that he has never smoked  He has never used smokeless tobacco  He reports current alcohol use  He reports that he does not use drugs  Current Outpatient Medications   Medication Sig Dispense Refill    amLODIPine (NORVASC) 5 mg tablet Take 1 tablet (5 mg total) by mouth daily 90 tablet 3    aspirin (ECOTRIN LOW STRENGTH) 81 mg EC tablet Take 1 tablet (81 mg total) by mouth in the morning  0    atorvastatin (LIPITOR) 40 mg tablet Take 1 tablet (40 mg total) by mouth every evening 90 tablet 1    fenofibrate (TRICOR) 48 mg tablet Take 1 tablet (48 mg total) by mouth daily 90 tablet 3    lisinopril (ZESTRIL) 5 mg tablet Take 1 tablet (5 mg total) by mouth daily 90 tablet 1     No current facility-administered medications for this visit  He is allergic to penicillins       Review of Systems   Constitutional: Negative  HENT: Negative  Eyes: Negative  Respiratory: Negative  Cardiovascular: Negative  Gastrointestinal: Negative  Genitourinary:        Some urinary leakage - improved   Musculoskeletal: Negative for arthralgias, back pain, gait problem (improved), joint swelling and myalgias  Skin: Negative  Neurological: Positive for weakness (L arm - improving)  Negative for dizziness (resolved), facial asymmetry (improved), light-headedness, numbness and headaches  Psychiatric/Behavioral: Negative            Objective:      /80   Pulse 76   Temp 98 4 °F (36 9 °C)   Ht 6' (1 829 m)   Wt 104 kg (230 lb)   BMI 31 19 kg/m²          Physical Exam  Vitals reviewed  Constitutional:       Comments: ?subtle L facial droop   HENT:      Head: Normocephalic  Right Ear: Tympanic membrane, ear canal and external ear normal       Left Ear: Tympanic membrane, ear canal and external ear normal       Nose: No congestion  Mouth/Throat:      Mouth: Mucous membranes are moist       Comments: Tongue midline  Eyes:      Extraocular Movements: Extraocular movements intact  Conjunctiva/sclera: Conjunctivae normal       Pupils: Pupils are equal, round, and reactive to light  Comments: ?decreased vision in L lateral field with confrontation   Neck:      Vascular: No carotid bruit  Cardiovascular:      Rate and Rhythm: Normal rate and regular rhythm  Pulses: Normal pulses  Pulmonary:      Effort: Pulmonary effort is normal    Abdominal:      General: Abdomen is flat  There is no distension  Palpations: Abdomen is soft  There is no mass  Tenderness: There is no abdominal tenderness  There is no right CVA tenderness or left CVA tenderness  Musculoskeletal:         General: No swelling, tenderness or deformity  Cervical back: Normal range of motion  No tenderness  Right lower leg: No edema  Left lower leg: No edema  Comments: Decreased ROM of L shoulder   Lymphadenopathy:      Cervical: No cervical adenopathy  Skin:     General: Skin is warm  Capillary Refill: Capillary refill takes less than 2 seconds  Neurological:      Mental Status: He is alert and oriented to person, place, and time  Cranial Nerves: No cranial nerve deficit  Sensory: No sensory deficit  Motor: Weakness (L arm - persistent) present  Gait: Gait normal       Deep Tendon Reflexes: Reflexes abnormal (sl increased on L)     Psychiatric:         Mood and Affect: Mood normal

## 2022-07-02 NOTE — ASSESSMENT & PLAN NOTE
Improving  Continue present care  F/u with Neuro and Physiatry   Await driving safety eval  Recheck 3m

## 2022-07-19 ENCOUNTER — APPOINTMENT (OUTPATIENT)
Dept: LAB | Facility: CLINIC | Age: 67
End: 2022-07-19
Payer: MEDICARE

## 2022-07-19 DIAGNOSIS — E78.2 MIXED HYPERLIPIDEMIA: ICD-10-CM

## 2022-07-19 LAB
ALBUMIN SERPL BCP-MCNC: 3.8 G/DL (ref 3.5–5)
ALP SERPL-CCNC: 87 U/L (ref 46–116)
ALT SERPL W P-5'-P-CCNC: 25 U/L (ref 12–78)
ANION GAP SERPL CALCULATED.3IONS-SCNC: 3 MMOL/L (ref 4–13)
AST SERPL W P-5'-P-CCNC: 24 U/L (ref 5–45)
BILIRUB SERPL-MCNC: 0.76 MG/DL (ref 0.2–1)
BUN SERPL-MCNC: 12 MG/DL (ref 5–25)
CALCIUM SERPL-MCNC: 9.3 MG/DL (ref 8.3–10.1)
CHLORIDE SERPL-SCNC: 113 MMOL/L (ref 96–108)
CHOLEST SERPL-MCNC: 125 MG/DL
CO2 SERPL-SCNC: 24 MMOL/L (ref 21–32)
CREAT SERPL-MCNC: 1.09 MG/DL (ref 0.6–1.3)
GFR SERPL CREATININE-BSD FRML MDRD: 69 ML/MIN/1.73SQ M
GLUCOSE P FAST SERPL-MCNC: 109 MG/DL (ref 65–99)
HDLC SERPL-MCNC: 33 MG/DL
LDLC SERPL CALC-MCNC: 73 MG/DL (ref 0–100)
NONHDLC SERPL-MCNC: 92 MG/DL
POTASSIUM SERPL-SCNC: 3.6 MMOL/L (ref 3.5–5.3)
PROT SERPL-MCNC: 7.2 G/DL (ref 6.4–8.4)
SODIUM SERPL-SCNC: 140 MMOL/L (ref 135–147)
TRIGL SERPL-MCNC: 96 MG/DL

## 2022-07-19 PROCEDURE — 80061 LIPID PANEL: CPT

## 2022-07-19 PROCEDURE — 80053 COMPREHEN METABOLIC PANEL: CPT

## 2022-07-19 PROCEDURE — 36415 COLL VENOUS BLD VENIPUNCTURE: CPT

## 2022-08-09 ENCOUNTER — OFFICE VISIT (OUTPATIENT)
Dept: NEUROLOGY | Facility: CLINIC | Age: 67
End: 2022-08-09
Payer: MEDICARE

## 2022-08-09 VITALS
WEIGHT: 224.9 LBS | HEART RATE: 80 BPM | DIASTOLIC BLOOD PRESSURE: 76 MMHG | TEMPERATURE: 98 F | SYSTOLIC BLOOD PRESSURE: 112 MMHG | BODY MASS INDEX: 30.46 KG/M2 | HEIGHT: 72 IN | RESPIRATION RATE: 14 BRPM

## 2022-08-09 DIAGNOSIS — I60.9 SAH (SUBARACHNOID HEMORRHAGE) (HCC): ICD-10-CM

## 2022-08-09 DIAGNOSIS — I63.9 ISCHEMIC STROKE (HCC): Primary | ICD-10-CM

## 2022-08-09 DIAGNOSIS — I77.71 CAROTID ARTERY DISSECTION (HCC): ICD-10-CM

## 2022-08-09 PROCEDURE — 99215 OFFICE O/P EST HI 40 MIN: CPT | Performed by: PHYSICIAN ASSISTANT

## 2022-08-09 NOTE — PATIENT INSTRUCTIONS
Ischemic stroke ([facility] [address] )  Pt denies any symptoms to suggest new stroke  Pt should continue Aspirin and atorvastatin for secondary stroke prevention  Pt was encouraged to get regular exercise and follow a low salt diet  If pt has any new stroke-like symptoms including sudden painless loss of vision or double vision, difficulty speaking or swallowing, vertigo / room spinning that does not quickly resolve, or weakness / numbness affecting 1 side of the face or body he should proceed by ambulance to the nearest emergency room immediately  I have spent 60 minutes with Patient and family today in which greater than 50% of this time was spent in counseling/coordination of care regarding Diagnostic results, Prognosis, Risks and benefits of tx options, Intructions for management, Patient and family education, Importance of tx compliance, Risk factor reductions and Impressions  Pt will follow-up in 4 months  Subarachnoid hemorrhage ([facility] [address] )  Repeat MRI to ensure stability  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(HCC)  Will repeat brain MRI and CTA head and neck

## 2022-08-09 NOTE — PROGRESS NOTES
Patient ID: Pham Cunha  is a 74 y o  male  Assessment/Plan:    Ischemic stroke ([facility] )   Pt denies any symptoms to suggest new stroke   Pt should continue Aspirin and atorvastatin for secondary stroke prevention   Pt was encouraged to get regular exercise and follow a low salt diet   If pt has any new stroke-like symptoms including sudden painless loss of vision or double vision, difficulty speaking or swallowing, vertigo / room spinning that does not quickly resolve, or weakness / numbness affecting 1 side of the face or body he should proceed by ambulance to the nearest emergency room immediately   I have spent 60 minutes with Patient and family today in which greater than 50% of this time was spent in counseling/coordination of care regarding Diagnostic results, Prognosis, Risks and benefits of tx options, Intructions for management, Patient and family education, Importance of tx compliance, Risk factor reductions and Impressions   Pt will follow-up in 4 months  Subarachnoid hemorrhage (HCC)  · Repeat MRI to ensure stability  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(HCC)  · Will repeat brain MRI and CTA head and neck  Problem List Items Addressed This Visit        Cardiovascular and Mediastinum    Ischemic stroke ([facility] ) - Primary      Pt denies any symptoms to suggest new stroke   Pt should continue Aspirin and atorvastatin for secondary stroke prevention   Pt was encouraged to get regular exercise and follow a low salt diet   If pt has any new stroke-like symptoms including sudden painless loss of vision or double vision, difficulty speaking or swallowing, vertigo / room spinning that does not quickly resolve, or weakness / numbness affecting 1 side of the face or body he should proceed by ambulance to the nearest emergency room immediately     I have spent 60 minutes with Patient and family today in which greater than 50% of this time was spent in counseling/coordination of care regarding Diagnostic results, Prognosis, Risks and benefits of tx options, Intructions for management, Patient and family education, Importance of tx compliance, Risk factor reductions and Impressions   Pt will follow-up in 4 months  Relevant Orders    MRI brain without contrast      Other Visit Diagnoses     SAH (subarachnoid hemorrhage) ([facility] )        Relevant Orders    MRI brain without contrast    Carotid artery dissection ([facility] )        Relevant Orders    MRI brain without contrast    CTA head and neck w wo contrast    Basic metabolic panel             Subjective:    HPI    Luis Daniel Wadsworth is a 63yo male, with hyperlipidemia and pre-diabetes, who presented to Urgent Care after 2 falls at home  The falls were associated with dizziness/lightheadedness  At Urgent Care he was noted to have a Lt UE drift and he was sent to the ED  CT head revealed concern for hyperdense right MCA in the area of the M1 bifurcation, which could represent acute thrombus vs calcification vs artifact  CTA head/neck: not completed due to contrast shortage and his not being a candidate for thrombectomy due to low NIHSS  He was started on ASA, Plavix and atorvastatin  Brain MRI revealed right hemispheric infarcts watershed territory  MRA head and neck demonstrated right ICA and right MCA occlusions and left vertebral artery stenosis/occlusion  Suspected etiology to be ESUS  LUCY was  Unrevealing  He was scheduled to be discharged however he experienced an episode in which he became diaphoretic, dizzy, leaning to the side, and had slurred speech/left facial droop/left UE weakness  Patient was placed back into bed  SBP was reported to be in the 140s at that time  BS was 123  Stroke alert was initiated  CTA head and neck was obtained and showed large right cervical ICA dissection extended to intracranial portion  This is likely the etiology of his stroke  Meanwhile, left vertebral was occluded  He was started on ASA, atorvastatin and Plavix   He also developed bowel and bladder incontinence  MRI of the C, T and L-spine was done and unrevealing for a cause  Incontinence did improve  On 5/12/22 he had a fall with head strike  He had worsening neuro deficits when examined after the fall  A head CT revealed a SAH  ASA and Plavix was stopped  Once stability was ensured, ASA was resumed  He was then discharged to [facility] when he underwent 10 days of acute rehab  He states that he has been doing well since discharge  He is tolerating ASA and atorvastatin without difficulty  He does get regular exercise in the form of walking  He walks 20 miles per week  He is following a low salt diet and has lost 30 pounds since DC  He will complete the PT program later this week  He is not yet driving  He is going through the driving program at [facility]  He has resumed most of his daily activities but is still avoiding strenuous work  The following portions of the patient's history were reviewed and updated as appropriate: allergies, current medications, past family history, past medical history, past social history, past surgical history and problem list          Objective:    Blood pressure 112/76, pulse 80, temperature 98 °F (36 7 °C), temperature source Temporal, resp  rate 14, height 6' (1 829 m), weight 102 kg (224 lb 14 4 oz)  Physical Exam  Vitals reviewed  Eyes:      General: Lids are normal       Extraocular Movements: Extraocular movements intact  Pupils: Pupils are equal, round, and reactive to light  Neurological:      Coordination: Coordination is intact  Deep Tendon Reflexes: Strength normal and reflexes are normal and symmetric  Psychiatric:         Speech: Speech normal          Neurological Exam  Mental Status   Oriented to person, place, time and situation  Recent and remote memory are intact  Speech is normal  Language is fluent with no aphasia   Attention and concentration are normal  Fund of knowledge is appropriate for level of education  Apraxia absent  Cranial Nerves  CN II: Visual acuity is normal  Visual fields full to confrontation  CN III, IV, VI: Extraocular movements intact bilaterally  Normal lids and orbits bilaterally  Pupils equal round and reactive to light bilaterally  CN V: Facial sensation is normal   CN VII: Full and symmetric facial movement  CN VIII: Hearing is normal   CN IX, X: Palate elevates symmetrically  Normal gag reflex  CN XI: Shoulder shrug strength is normal   CN XII: Tongue midline without atrophy or fasciculations  Motor   Strength is 5/5 throughout all four extremities  Slight pronator drift Lt UE  Sensory  Sensation is intact to light touch, pinprick, vibration and proprioception in all four extremities  Reflexes  Deep tendon reflexes are 2+ and symmetric in all four extremities  Coordination    Finger-to-nose, rapid alternating movements and heel-to-shin normal bilaterally without dysmetria  Gait  Normal casual, toe, heel and tandem gait  ROS:    Review of Systems   Constitutional: Negative  Negative for appetite change and fever  HENT: Negative  Negative for hearing loss, tinnitus, trouble swallowing and voice change  Eyes: Negative  Negative for photophobia and pain  Respiratory: Negative  Negative for shortness of breath  Cardiovascular: Negative  Negative for palpitations  Gastrointestinal: Negative  Negative for nausea and vomiting  Endocrine: Negative  Negative for cold intolerance  Genitourinary: Negative  Negative for dysuria, frequency and urgency  Musculoskeletal: Negative  Negative for myalgias and neck pain  Skin: Negative  Negative for rash  Neurological: Negative  Negative for dizziness, tremors, seizures, syncope, facial asymmetry, speech difficulty, weakness, light-headedness, numbness and headaches  Hematological: Negative  Does not bruise/bleed easily  Psychiatric/Behavioral: Negative    Negative for confusion, hallucinations and sleep disturbance  Review of systems personally reviewed

## 2022-08-09 NOTE — ASSESSMENT & PLAN NOTE
 Pt denies any symptoms to suggest new stroke   Pt should continue Aspirin and atorvastatin for secondary stroke prevention   Pt was encouraged to get regular exercise and follow a low salt diet   If pt has any new stroke-like symptoms including sudden painless loss of vision or double vision, difficulty speaking or swallowing, vertigo / room spinning that does not quickly resolve, or weakness / numbness affecting 1 side of the face or body he should proceed by ambulance to the nearest emergency room immediately   I have spent 60 minutes with Patient and family today in which greater than 50% of this time was spent in counseling/coordination of care regarding Diagnostic results, Prognosis, Risks and benefits of tx options, Intructions for management, Patient and family education, Importance of tx compliance, Risk factor reductions and Impressions   Pt will follow-up in 4 months

## 2022-08-10 ENCOUNTER — HOSPITAL ENCOUNTER (OUTPATIENT)
Dept: MRI IMAGING | Facility: HOSPITAL | Age: 67
Discharge: HOME | End: 2022-08-10
Payer: MEDICARE

## 2022-08-10 DIAGNOSIS — I63.9 ISCHEMIC STROKE (HCC): ICD-10-CM

## 2022-08-10 DIAGNOSIS — I77.71 CAROTID ARTERY DISSECTION (HCC): ICD-10-CM

## 2022-08-10 DIAGNOSIS — I60.9 SAH (SUBARACHNOID HEMORRHAGE) (HCC): ICD-10-CM

## 2022-08-10 PROCEDURE — G1004 CDSM NDSC: HCPCS

## 2022-08-10 PROCEDURE — 70551 MRI BRAIN STEM W/O DYE: CPT

## 2022-08-15 ENCOUNTER — PATIENT OUTREACH (OUTPATIENT)
Dept: FAMILY MEDICINE CLINIC | Facility: CLINIC | Age: 67
End: 2022-08-15

## 2022-08-24 ENCOUNTER — APPOINTMENT (OUTPATIENT)
Dept: LAB | Facility: CLINIC | Age: 67
End: 2022-08-24
Payer: MEDICARE

## 2022-08-24 DIAGNOSIS — I77.71 CAROTID ARTERY DISSECTION (HCC): ICD-10-CM

## 2022-08-24 LAB
ANION GAP SERPL CALCULATED.3IONS-SCNC: 5 MMOL/L (ref 4–13)
BUN SERPL-MCNC: 20 MG/DL (ref 5–25)
CALCIUM SERPL-MCNC: 9.3 MG/DL (ref 8.3–10.1)
CHLORIDE SERPL-SCNC: 113 MMOL/L (ref 96–108)
CO2 SERPL-SCNC: 25 MMOL/L (ref 21–32)
CREAT SERPL-MCNC: 1.22 MG/DL (ref 0.6–1.3)
GFR SERPL CREATININE-BSD FRML MDRD: 60 ML/MIN/1.73SQ M
GLUCOSE P FAST SERPL-MCNC: 99 MG/DL (ref 65–99)
POTASSIUM SERPL-SCNC: 4.2 MMOL/L (ref 3.5–5.3)
SODIUM SERPL-SCNC: 143 MMOL/L (ref 135–147)

## 2022-08-24 PROCEDURE — 36415 COLL VENOUS BLD VENIPUNCTURE: CPT

## 2022-08-24 PROCEDURE — 80048 BASIC METABOLIC PNL TOTAL CA: CPT

## 2022-09-07 ENCOUNTER — TELEPHONE (OUTPATIENT)
Dept: NEUROLOGY | Facility: CLINIC | Age: 67
End: 2022-09-07

## 2022-09-07 NOTE — TELEPHONE ENCOUNTER
----- Message from Mar Chong PA-C sent at 8/12/2022  9:52 AM EDT -----  MRI without new or unexpected findings

## 2022-09-14 ENCOUNTER — HOSPITAL ENCOUNTER (OUTPATIENT)
Dept: CT IMAGING | Facility: HOSPITAL | Age: 67
Discharge: HOME | End: 2022-09-14
Payer: MEDICARE

## 2022-09-14 DIAGNOSIS — I77.71 CAROTID ARTERY DISSECTION (HCC): ICD-10-CM

## 2022-09-14 PROCEDURE — 70496 CT ANGIOGRAPHY HEAD: CPT

## 2022-09-14 PROCEDURE — 70498 CT ANGIOGRAPHY NECK: CPT

## 2022-09-14 PROCEDURE — G1004 CDSM NDSC: HCPCS

## 2022-09-14 RX ADMIN — IOHEXOL 85 ML: 350 INJECTION, SOLUTION INTRAVENOUS at 19:14

## 2022-09-19 ENCOUNTER — TELEPHONE (OUTPATIENT)
Dept: NEUROLOGY | Facility: CLINIC | Age: 67
End: 2022-09-19

## 2022-09-19 NOTE — TELEPHONE ENCOUNTER
Pt's wife went out for a walk  Spoke with pt and reviewed CA results  No change in treatment  Pt will continue ASA and statin  He will also continue healthy living habits ie diet and exercise  Pt is retired and does not have any restrictions at this time  He is having a driving test at [facility] later this week  He will keep his follow-up appt as scheduled  He will go directly to the ER should he experience any new stroke symptoms/sudden vision loss  Pt verbalized understanding of all of the above  He will have his wife call back if she has any further questions

## 2022-09-19 NOTE — TELEPHONE ENCOUNTER
Received voicemail from Radiology for call back [phone]  CTA was significant findings  Returned call and acknowledged message  Patient of Cleve Zaidi, last visit August 9  CTA shows:      No mass effect, acute intracranial hemorrhage or CT evidence for large acute vascular sheath infarct  Chronic right cerebral hemisphere infarcts as described above      Redemonstration of right internal carotid artery occlusion, as described above  Severe narrowing /subtotal occlusion of the distal cavernous /supraclinoid segment of the right internal carotid artery is noted and progressed since the prior study      Stable occlusion of the proximal left vertebral artery      The study was marked in EPIC for immediate notification  TT sent to Cleve Zaidi

## 2022-09-19 NOTE — TELEPHONE ENCOUNTER
Received voicemail from patient's wife Sukhdev Pace  She said she saw CTA results in patients MyChart, thought they looked okay in layman's terms but is asking for input from Renan Almonte  Also asking if patient can resume full duties at work   [phone]  Please provide input/recommendation  Thank you

## 2022-09-22 NOTE — TELEPHONE ENCOUNTER
Received: 3 days ago  Holly Gutiérrez PA-C  P Neurology [facility] Team 4  Discussed result with Dr Elvis Llanos and pt  No change in tx at this time

## 2022-09-28 ENCOUNTER — RA CDI HCC (OUTPATIENT)
Dept: OTHER | Facility: HOSPITAL | Age: 67
End: 2022-09-28

## 2022-09-28 NOTE — PROGRESS NOTES
[address]  coding opportunities       Chart reviewed, no opportunity found: CHART REVIEWED, NO OPPORTUNITY FOUND        Patients Insurance     Medicare Insurance: Medicare

## 2022-10-05 ENCOUNTER — OFFICE VISIT (OUTPATIENT)
Dept: FAMILY MEDICINE CLINIC | Facility: CLINIC | Age: 67
End: 2022-10-05
Payer: MEDICARE

## 2022-10-05 VITALS
BODY MASS INDEX: 29.36 KG/M2 | HEART RATE: 77 BPM | OXYGEN SATURATION: 98 % | HEIGHT: 72 IN | TEMPERATURE: 97.9 F | DIASTOLIC BLOOD PRESSURE: 72 MMHG | SYSTOLIC BLOOD PRESSURE: 116 MMHG | WEIGHT: 216.8 LBS

## 2022-10-05 DIAGNOSIS — E78.2 MIXED HYPERLIPIDEMIA: Chronic | ICD-10-CM

## 2022-10-05 DIAGNOSIS — I77.71 DISSECTION OF CAROTID ARTERY (HCC): ICD-10-CM

## 2022-10-05 DIAGNOSIS — I10 HYPERTENSION, ESSENTIAL: Primary | Chronic | ICD-10-CM

## 2022-10-05 PROCEDURE — 99214 OFFICE O/P EST MOD 30 MIN: CPT | Performed by: FAMILY MEDICINE

## 2022-10-05 NOTE — PROGRESS NOTES
Name: Symone Thao  : 1955      MRN: [id number]  Encounter Provider: Amos Littlejohn MD  Encounter Date: 10/5/2022   Encounter department: [address] [place] [address] Road     1  Hypertension, essential  Assessment & Plan:  Well controlled  Cont present treatment  Monitor labs  Recheck 6m      Orders:  -     CBC and differential; Future  -     Comprehensive metabolic panel; Future  -     Lipid panel; Future    2  Mixed hyperlipidemia  Assessment & Plan:  Check labs  Continue atorvastatin  Recheck 6m      3  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[facility])  Assessment & Plan:  S/p CVA  No change in intravascular lesions on recent scan  Continue atorvastatin  F/u with Neuro  Recheck 6m             Subjective      f/u multiple med issues  - pt doing very well  - pt has been cleared to drive by Mayank Dimple  Pt feels that he is back to his pre-CVA baseline  Continues to f/u with Neuro  - pt due to see vascular surgery  In December  Repeat CTA in Sept showed improvement of previous CVA area and no change in the intracranial vascular lesions  - pt has been released by PT  Pt is walking regularly and is presently doing 20mi / week  He denies CP, palpitations, lightheadedness or other CV symptoms with or without exertion  - he denies any new GI or  complaints  Pt is overdue for colonoscopy which was on hold due to CVA  - no other concerns                    Review of Systems   Constitutional: Negative  HENT: Negative  Eyes: Negative  Respiratory: Negative  Cardiovascular: Negative  Gastrointestinal: Negative  Genitourinary: Negative  Musculoskeletal: Negative  Negative for arthralgias, back pain, gait problem, joint swelling and myalgias  Skin: Negative  Neurological: Negative for dizziness, facial asymmetry (improved), weakness (L arm almost back to baseline), light-headedness, numbness and headaches  Psychiatric/Behavioral: Negative          Current Outpatient Medications on File Prior to Visit   Medication Sig    amLODIPine (NORVASC) 5 mg tablet Take 1 tablet (5 mg total) by mouth daily    aspirin (ECOTRIN LOW STRENGTH) 81 mg EC tablet Take 1 tablet (81 mg total) by mouth in the morning   fenofibrate (TRICOR) 48 mg tablet Take 1 tablet (48 mg total) by mouth daily    atorvastatin (LIPITOR) 40 mg tablet Take 1 tablet (40 mg total) by mouth every evening (Patient taking differently: Take 40 mg by mouth daily)    lisinopril (ZESTRIL) 5 mg tablet Take 1 tablet (5 mg total) by mouth daily    [DISCONTINUED] clopidogrel (PLAVIX) 75 mg tablet Take 1 tablet (75 mg total) by mouth in the morning  Objective     /72   Pulse 77   Temp 97 9 °F (36 6 °C)   Ht 6' (1 829 m)   Wt 98 3 kg (216 lb 12 8 oz)   SpO2 98%   BMI 29 40 kg/m²     Physical Exam  Vitals reviewed  HENT:      Head: Normocephalic  Right Ear: Tympanic membrane, ear canal and external ear normal       Left Ear: Tympanic membrane, ear canal and external ear normal       Nose: No congestion  Mouth/Throat:      Mouth: Mucous membranes are moist    Eyes:      Extraocular Movements: Extraocular movements intact  Conjunctiva/sclera: Conjunctivae normal       Pupils: Pupils are equal, round, and reactive to light  Neck:      Vascular: No carotid bruit  Cardiovascular:      Rate and Rhythm: Normal rate and regular rhythm  Pulses: Normal pulses  Pulmonary:      Effort: Pulmonary effort is normal    Abdominal:      General: Abdomen is flat  There is no distension  Palpations: Abdomen is soft  There is no mass  Tenderness: There is no abdominal tenderness  There is no right CVA tenderness or left CVA tenderness  Musculoskeletal:         General: No swelling, tenderness or deformity  Cervical back: Normal range of motion  No tenderness  Right lower leg: No edema  Left lower leg: No edema     Lymphadenopathy:      Cervical: No cervical adenopathy  Skin:     General: Skin is warm  Capillary Refill: Capillary refill takes less than 2 seconds  Neurological:      Mental Status: He is alert and oriented to person, place, and time  Cranial Nerves: No cranial nerve deficit  Sensory: No sensory deficit  Motor: No weakness (L arm improved)        Gait: Gait normal       Deep Tendon Reflexes: Reflexes normal    Psychiatric:         Mood and Affect: Mood normal        Linsey Crowder MD

## 2022-10-07 NOTE — ASSESSMENT & PLAN NOTE
S/p CVA  No change in intravascular lesions on recent scan  Continue atorvastatin  F/u with Neuro   Recheck 6m

## 2022-10-24 DIAGNOSIS — I10 ESSENTIAL HYPERTENSION: ICD-10-CM

## 2022-10-24 RX ORDER — AMLODIPINE BESYLATE 5 MG/1
5 TABLET ORAL DAILY
Qty: 90 TABLET | Refills: 3 | Status: SHIPPED | OUTPATIENT
Start: 2022-10-24

## 2022-11-22 ENCOUNTER — APPOINTMENT (OUTPATIENT)
Dept: LAB | Facility: CLINIC | Age: 67
End: 2022-11-22

## 2022-11-22 DIAGNOSIS — I10 HYPERTENSION, ESSENTIAL: Chronic | ICD-10-CM

## 2022-11-22 LAB
ALBUMIN SERPL BCP-MCNC: 3.9 G/DL (ref 3.5–5)
ALP SERPL-CCNC: 77 U/L (ref 46–116)
ALT SERPL W P-5'-P-CCNC: 21 U/L (ref 12–78)
ANION GAP SERPL CALCULATED.3IONS-SCNC: 5 MMOL/L (ref 4–13)
AST SERPL W P-5'-P-CCNC: 17 U/L (ref 5–45)
BASOPHILS # BLD AUTO: 0.06 THOUSANDS/ÂΜL (ref 0–0.1)
BASOPHILS NFR BLD AUTO: 1 % (ref 0–1)
BILIRUB SERPL-MCNC: 0.61 MG/DL (ref 0.2–1)
BUN SERPL-MCNC: 16 MG/DL (ref 5–25)
CALCIUM SERPL-MCNC: 9.4 MG/DL (ref 8.3–10.1)
CHLORIDE SERPL-SCNC: 112 MMOL/L (ref 96–108)
CHOLEST SERPL-MCNC: 117 MG/DL
CO2 SERPL-SCNC: 24 MMOL/L (ref 21–32)
CREAT SERPL-MCNC: 1.12 MG/DL (ref 0.6–1.3)
EOSINOPHIL # BLD AUTO: 0.26 THOUSAND/ÂΜL (ref 0–0.61)
EOSINOPHIL NFR BLD AUTO: 3 % (ref 0–6)
ERYTHROCYTE [DISTWIDTH] IN BLOOD BY AUTOMATED COUNT: 12.7 % (ref 11.6–15.1)
GFR SERPL CREATININE-BSD FRML MDRD: 66 ML/MIN/1.73SQ M
GLUCOSE P FAST SERPL-MCNC: 99 MG/DL (ref 65–99)
HCT VFR BLD AUTO: 46.5 % (ref 36.5–49.3)
HDLC SERPL-MCNC: 37 MG/DL
HGB BLD-MCNC: 15.4 G/DL (ref 12–17)
IMM GRANULOCYTES # BLD AUTO: 0.02 THOUSAND/UL (ref 0–0.2)
IMM GRANULOCYTES NFR BLD AUTO: 0 % (ref 0–2)
LDLC SERPL CALC-MCNC: 66 MG/DL (ref 0–100)
LYMPHOCYTES # BLD AUTO: 1.41 THOUSANDS/ÂΜL (ref 0.6–4.47)
LYMPHOCYTES NFR BLD AUTO: 19 % (ref 14–44)
MCH RBC QN AUTO: 30.2 PG (ref 26.8–34.3)
MCHC RBC AUTO-ENTMCNC: 33.1 G/DL (ref 31.4–37.4)
MCV RBC AUTO: 91 FL (ref 82–98)
MONOCYTES # BLD AUTO: 0.65 THOUSAND/ÂΜL (ref 0.17–1.22)
MONOCYTES NFR BLD AUTO: 9 % (ref 4–12)
NEUTROPHILS # BLD AUTO: 5.22 THOUSANDS/ÂΜL (ref 1.85–7.62)
NEUTS SEG NFR BLD AUTO: 68 % (ref 43–75)
NONHDLC SERPL-MCNC: 80 MG/DL
NRBC BLD AUTO-RTO: 0 /100 WBCS
PLATELET # BLD AUTO: 267 THOUSANDS/UL (ref 149–390)
PMV BLD AUTO: 10.2 FL (ref 8.9–12.7)
POTASSIUM SERPL-SCNC: 3.9 MMOL/L (ref 3.5–5.3)
PROT SERPL-MCNC: 7.1 G/DL (ref 6.4–8.4)
RBC # BLD AUTO: 5.1 MILLION/UL (ref 3.88–5.62)
SODIUM SERPL-SCNC: 141 MMOL/L (ref 135–147)
TRIGL SERPL-MCNC: 70 MG/DL
WBC # BLD AUTO: 7.62 THOUSAND/UL (ref 4.31–10.16)

## 2022-11-28 ENCOUNTER — TELEPHONE (OUTPATIENT)
Dept: NEUROLOGY | Facility: CLINIC | Age: 67
End: 2022-11-28

## 2022-11-28 NOTE — TELEPHONE ENCOUNTER
Pt called to say he will be out of town for appt that was scheduled  New appt is now 1/3/23 @ 11:00 with Jeferson in [place]

## 2023-01-03 ENCOUNTER — OFFICE VISIT (OUTPATIENT)
Dept: NEUROLOGY | Facility: CLINIC | Age: 68
End: 2023-01-03

## 2023-01-03 VITALS
HEIGHT: 72 IN | BODY MASS INDEX: 29.28 KG/M2 | HEART RATE: 81 BPM | SYSTOLIC BLOOD PRESSURE: 131 MMHG | DIASTOLIC BLOOD PRESSURE: 72 MMHG | TEMPERATURE: 96.3 F | WEIGHT: 216.2 LBS

## 2023-01-03 DIAGNOSIS — Z86.73 HISTORY OF STROKE: Primary | ICD-10-CM

## 2023-01-03 DIAGNOSIS — I77.71 DISSECTION OF CAROTID ARTERY (HCC): ICD-10-CM

## 2023-01-03 NOTE — PROGRESS NOTES
Patient ID: Zhao Vega  is a 69 y o  male  who presents for follow-up evaluation with regard to his prior stroke (May 2022)  Embolic stroke likely due to R-ICA dissection  Hx of right ICA occlusion, Right MCA occlussion, and Left vertebral artery stenosis/occlusion  Residual changes include some speech changes  Assessment/Plan:    History of stroke  - Vascular risk factors overall well controlled  Continue on current medication regimen   - Discussed lifestyle modifications  He has lost 40 pounds and is continuing to work on his diet and remain active  - He did inquire about reducing some of the medications he takes including the fenofibrate and BP medication  Asked him to discuss this further with his PCP  However, we did discuss where we like to see his values from a stroke standpoint and that it is difficult to determine whether he can come off of those medications because he may likely be controlled due to being on the medications  He did understand this, discussed with him that he will likely also be on the atorvastatin and aspirin indefinitely  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([address] )  - Last CTA completed 9/14/2022   - Will plan to discuss with attending need for repeat imaging and will contact the patient to discuss  Plan discussed with patient today:  Patient Instructions   For ongoing stroke prevention   - Continue: Aspirin, Atorvastatin   - Recommend to check blood pressure occasionally away from the doctor's office to make sure that those numbers are typically less than 130/80    If they are frequently higher than that, we recommend checking a little more often and to follow up with primary care team   - Will defer to primary care team for monitoring of cholesterol panel and blood sugar numbers with target LDL cholesterol of less than 70 and hemoglobin A1c less than 7%  - Recommend following a low salt, mediterranean diet   - Recommend routine physical exercise as tolerated     We will plan for him to return to the office in 6 months but would be happy to see him sooner if the need should arise  If he has any symptoms concerning for TIA or stroke including sudden painless loss of vision or double vision, difficulty speaking or swallowing, vertigo/room spinning that does not quickly resolve, or weakness/numbness/loss of coordination affecting 1 side of the face or body he should proceed by ambulance to the nearest emergency room immediately  Subjective:    HPI  Current stroke prevention medications:  1  Aspirin 81 mg   2  Atorvastatin 40 mg     Since his last visit, he does not report any new stroke symptoms  He takes his medication as prescribed and did not endorse any bleeding or bruising issues  He  has not had any recent falls  Mood is good, sleep is normal for him, has not noticed a change, does not snore  Appetite is overall okay but has noticed that he does not eat as much as before  He is doing facial muscle exercises to help with his speech  The following portions of the patient's history were reviewed and updated as appropriate: allergies, current medications, past family history, past medical history, past social history, past surgical history and problem list      Objective:    Blood pressure 131/72, pulse 81, temperature (!) 96 3 °F (35 7 °C), temperature source Temporal, height 6' (1 829 m), weight 98 1 kg (216 lb 3 2 oz)      Lab Results   Component Value Date/Time    CHOLESTEROL 117 11/22/2022 08:41 AM     Lab Results   Component Value Date/Time    TRIG 70 11/22/2022 08:41 AM    TRIG 146 10/06/2016 07:45 AM     Lab Results   Component Value Date/Time    HDL 37 (L) 11/22/2022 08:41 AM    HDL 33 (L) 10/06/2016 07:45 AM     Lab Results   Component Value Date/Time    LDLCALC 66 11/22/2022 08:41 AM       Lab Results   Component Value Date/Time    HGBA1C 5 9 (H) 05/08/2022 10:44 PM     Lab Results   Component Value Date/Time     05/08/2022 10:44 PM       Neurological Exam  Awake, alert, oriented, and in no apparent distress  Mood is appropriate to situation  Speech is fluent with no dysarthria or aphasia  Cranial nerves 2-12 were symmetrically intact bilaterally  Motor testing reveals 5/5 strength in the bilateral upper and lower extremities  Sensation is intact to light touch in the bilateral upper and lower extremities  Coordination intact, no drift present  Finger-to-nose absent for tremor, dysmetria, or ataxia  DTRs are symmetric and intact bilaterally  Able to rise without assistance, gait is stable including stance, stride, and arm swing  Review of Systems  Constitutional: Negative  Negative for appetite change and fever  HENT: Negative  Negative for hearing loss, tinnitus, trouble swallowing and voice change  Eyes: Negative  Negative for photophobia, pain and visual disturbance  Respiratory: Negative  Negative for shortness of breath  Cardiovascular: Negative  Negative for palpitations  Gastrointestinal: Negative  Negative for nausea and vomiting  Endocrine: Negative  Negative for cold intolerance  Genitourinary: Negative  Negative for dysuria, frequency and urgency  Musculoskeletal: Negative  Negative for gait problem, myalgias and neck pain  Skin: Negative  Negative for rash  Allergic/Immunologic: Negative  Neurological: Negative  Negative for dizziness, tremors, seizures, syncope, facial asymmetry, speech difficulty, weakness, light-headedness, numbness and headaches  Hematological: Negative  Does not bruise/bleed easily  Psychiatric/Behavioral: Negative  Negative for confusion, hallucinations and sleep disturbance  I personally reviewed the ROS that was entered by the medical assistant       I have spent 30 minutes with Patient  today in which greater than 50% of this time was spent in counseling/coordination of care regarding Diagnostic results, Prognosis, Risks and benefits of tx options, Intructions for management, Patient and family education, Importance of tx compliance, Risk factor reductions, Impressions and Plan of care as above

## 2023-01-03 NOTE — ASSESSMENT & PLAN NOTE
- Vascular risk factors overall well controlled  Continue on current medication regimen   - Discussed lifestyle modifications  He has lost 40 pounds and is continuing to work on his diet and remain active  - He did inquire about reducing some of the medications he takes including the fenofibrate and BP medication  Asked him to discuss this further with his PCP  However, we did discuss where we like to see his values from a stroke standpoint and that it is difficult to determine whether he can come off of those medications because he may likely be controlled due to being on the medications  He did understand this, discussed with him that he will likely also be on the atorvastatin and aspirin indefinitely

## 2023-01-03 NOTE — ASSESSMENT & PLAN NOTE
- Last CTA completed 9/14/2022   - Will plan to discuss with attending need for repeat imaging and will contact the patient to discuss

## 2023-01-03 NOTE — PATIENT INSTRUCTIONS
For ongoing stroke prevention   - Continue: Aspirin, Atorvastatin   - Recommend to check blood pressure occasionally away from the doctor's office to make sure that those numbers are typically less than 130/80  If they are frequently higher than that, we recommend checking a little more often and to follow up with primary care team   - Will defer to primary care team for monitoring of cholesterol panel and blood sugar numbers with target LDL cholesterol of less than 70 and hemoglobin A1c less than 7%  - Recommend following a low salt, mediterranean diet   - Recommend routine physical exercise as tolerated     We will plan for him to return to the office in 6 months but would be happy to see him sooner if the need should arise  If he has any symptoms concerning for TIA or stroke including sudden painless loss of vision or double vision, difficulty speaking or swallowing, vertigo/room spinning that does not quickly resolve, or weakness/numbness/loss of coordination affecting 1 side of the face or body he should proceed by ambulance to the nearest emergency room immediately

## 2023-01-05 ENCOUNTER — TELEPHONE (OUTPATIENT)
Dept: NEUROLOGY | Facility: CLINIC | Age: 68
End: 2023-01-05

## 2023-01-05 DIAGNOSIS — I77.71 DISSECTION OF CAROTID ARTERY (HCC): Primary | ICD-10-CM

## 2023-01-05 NOTE — TELEPHONE ENCOUNTER
Called and spoke to the patient and advised of the below information and provided the number to schedule  CARLOS ALBERTO Albarran  Waqar Pierce  Please let Mr  Janina Huang know that I spoke with Dr Lexie Gitelman and we would like him to repeat a CTA around march which would put in at about 6 months from his previous scan            Previous Messages     ----- Message -----   From: Evelyn Fabian MD   Sent: 1/4/2023   4:59 PM EST   To: CARLOS ALBERTO Albarran     I would do one more CTA at 6 months from the last scan in Sept (so 3/2023) to be sure we are stable and all good      ----- Message -----   From: CARLOS ALBERTO Albarran   Sent: 1/3/2023  12:15 PM EST   To: Evelyn Fabian MD     Hi Dr Lexie Gitelman, just wanted your input on when we should do repeat brain imaging on him if at all  Thank you

## 2023-01-12 DIAGNOSIS — I63.9 ISCHEMIC STROKE (HCC): ICD-10-CM

## 2023-01-12 RX ORDER — LISINOPRIL 5 MG/1
5 TABLET ORAL DAILY
Qty: 90 TABLET | Refills: 1 | Status: SHIPPED | OUTPATIENT
Start: 2023-01-12 | End: 2023-02-11

## 2023-01-12 NOTE — TELEPHONE ENCOUNTER
Pt also requested recent lab work (Creatinine and Bun results) faxed to [facility] CT department  Appt  Sched  For March      Faxed 1/12/2023 11:20am

## 2023-02-16 DIAGNOSIS — I63.9 ISCHEMIC STROKE (HCC): ICD-10-CM

## 2023-02-17 RX ORDER — ATORVASTATIN CALCIUM 40 MG/1
40 TABLET, FILM COATED ORAL EVERY EVENING
Qty: 90 TABLET | Refills: 0 | Status: SHIPPED | OUTPATIENT
Start: 2023-02-17 | End: 2023-05-18

## 2023-02-17 NOTE — TELEPHONE ENCOUNTER
Medication Refill Request     Name atorvastatin (LIPITOR) 40 mg tablet  Dose/Frequency Take 1 tablet (40 mg total) by mouth every evening  [id number] tablet  Verified pharmacy   [x]  Verified ordering Provider   [x]  Does patient have enough for the next 3 days?  Yes [x] No []

## 2023-03-13 ENCOUNTER — TELEPHONE (OUTPATIENT)
Dept: NEUROLOGY | Facility: CLINIC | Age: 68
End: 2023-03-13

## 2023-03-13 NOTE — TELEPHONE ENCOUNTER
I do not place orders for a patient  This would need to be sent to the nursing team      John Seen is there someone placing orders for [facility] while she is out of the office?

## 2023-03-13 NOTE — TELEPHONE ENCOUNTER
[facility] called patient needs blood work orders before MRI tomorrow  Patient is scheduled for CTA head   Patient needs to do BUN and Creatine before the test

## 2023-03-14 ENCOUNTER — HOSPITAL ENCOUNTER (OUTPATIENT)
Dept: CT IMAGING | Facility: HOSPITAL | Age: 68
Discharge: HOME | End: 2023-03-14

## 2023-03-14 ENCOUNTER — APPOINTMENT (OUTPATIENT)
Dept: LAB | Facility: CLINIC | Age: 68
End: 2023-03-14

## 2023-03-14 DIAGNOSIS — I77.71 DISSECTION OF CAROTID ARTERY (HCC): ICD-10-CM

## 2023-03-14 DIAGNOSIS — I77.71 DISSECTION OF CAROTID ARTERY (HCC): Primary | ICD-10-CM

## 2023-03-14 LAB
BUN SERPL-MCNC: 12 MG/DL (ref 5–25)
CREAT SERPL-MCNC: 0.95 MG/DL (ref 0.6–1.3)
GFR SERPL CREATININE-BSD FRML MDRD: 81 ML/MIN/1.73SQ M

## 2023-03-14 RX ADMIN — IOHEXOL 85 ML: 350 INJECTION, SOLUTION INTRAVENOUS at 14:27

## 2023-03-15 ENCOUNTER — TELEPHONE (OUTPATIENT)
Dept: FAMILY MEDICINE CLINIC | Facility: CLINIC | Age: 68
End: 2023-03-15

## 2023-03-15 NOTE — TELEPHONE ENCOUNTER
Patient called to reschedule his AWV that he hade scheduled for 3/27  He did not state why    I cannot find an available AWV appointment on your schedule before the end of the year    Can you please advise where we can reschedule it?

## 2023-03-20 ENCOUNTER — RA CDI HCC (OUTPATIENT)
Dept: OTHER | Facility: HOSPITAL | Age: 68
End: 2023-03-20

## 2023-03-30 NOTE — TELEPHONE ENCOUNTER
Patient called back stating that no one reached out to him to reschedule his AWV  He states that he might be available on a Wednesday or Friday morning, but is not 100% sure, as he watches his grandchildren a lot during there week  Can we offer a 930 same day?

## 2023-04-21 PROBLEM — I60.9 SUBARACHNOID HEMORRHAGE (HCC): Status: RESOLVED | Noted: 2022-05-13 | Resolved: 2023-04-21

## 2023-04-21 PROBLEM — R32 URINARY INCONTINENCE: Status: RESOLVED | Noted: 2022-05-12 | Resolved: 2023-04-21

## 2023-05-18 DIAGNOSIS — I63.9 ISCHEMIC STROKE (HCC): ICD-10-CM

## 2023-05-18 RX ORDER — ATORVASTATIN CALCIUM 40 MG/1
40 TABLET, FILM COATED ORAL EVERY EVENING
Qty: 90 TABLET | Refills: 0 | Status: SHIPPED | OUTPATIENT
Start: 2023-05-18 | End: 2023-08-16

## 2023-07-18 DIAGNOSIS — I63.9 ISCHEMIC STROKE (HCC): ICD-10-CM

## 2023-07-18 RX ORDER — LISINOPRIL 5 MG/1
5 TABLET ORAL DAILY
Qty: 90 TABLET | Refills: 1 | Status: SHIPPED | OUTPATIENT
Start: 2023-07-18 | End: 2024-01-14

## 2023-08-05 DIAGNOSIS — E78.2 MIXED HYPERLIPIDEMIA: ICD-10-CM

## 2023-08-05 NOTE — TELEPHONE ENCOUNTER
Medication Refill Request     Name  fenofibrate (TRICOR)  Dose/Frequency   48 mg tablet () Take 1 tablet (48 mg total) by mouth daily       Quantity 90  Verified pharmacy   [x]  Verified ordering Provider   [x]  Does patient have enough for the next 3 days?  Yes [x] No []

## 2023-08-07 DIAGNOSIS — E78.2 MIXED HYPERLIPIDEMIA: ICD-10-CM

## 2023-08-07 RX ORDER — FENOFIBRATE 48 MG/1
48 TABLET, COATED ORAL DAILY
Qty: 90 TABLET | Refills: 0 | Status: SHIPPED | OUTPATIENT
Start: 2023-08-07 | End: 2024-08-01

## 2023-08-07 RX ORDER — FENOFIBRATE 48 MG/1
48 TABLET, COATED ORAL DAILY
Qty: 90 TABLET | Refills: 3 | Status: SHIPPED | OUTPATIENT
Start: 2023-08-07

## 2023-08-21 DIAGNOSIS — I63.9 ISCHEMIC STROKE (HCC): ICD-10-CM

## 2023-08-21 RX ORDER — ATORVASTATIN CALCIUM 40 MG/1
40 TABLET, FILM COATED ORAL EVERY EVENING
Qty: 90 TABLET | Refills: 0 | Status: SHIPPED | OUTPATIENT
Start: 2023-08-21 | End: 2023-11-19

## 2023-09-07 ENCOUNTER — OFFICE VISIT (OUTPATIENT)
Dept: NEUROLOGY | Facility: CLINIC | Age: 68
End: 2023-09-07

## 2023-09-07 VITALS
BODY MASS INDEX: 27.78 KG/M2 | TEMPERATURE: 97.6 F | WEIGHT: 205.1 LBS | HEIGHT: 72 IN | DIASTOLIC BLOOD PRESSURE: 80 MMHG | SYSTOLIC BLOOD PRESSURE: 124 MMHG | HEART RATE: 71 BPM | OXYGEN SATURATION: 98 %

## 2023-09-07 DIAGNOSIS — I10 HYPERTENSION, ESSENTIAL: Chronic | ICD-10-CM

## 2023-09-07 DIAGNOSIS — E78.2 MIXED HYPERLIPIDEMIA: Chronic | ICD-10-CM

## 2023-09-07 DIAGNOSIS — I77.71 DISSECTION OF CAROTID ARTERY (HCC): ICD-10-CM

## 2023-09-07 DIAGNOSIS — Z86.73 HISTORY OF STROKE: Primary | ICD-10-CM

## 2023-09-07 NOTE — PATIENT INSTRUCTIONS
Carotid artery occlusion/dissection:    -We will talk to Dr. Mohini Vizcarra in regards that the patient needs a another CTA head and neck to monitor his carotid artery occlusion/dissection. His last CTA head and neck was in March 2023, came back with the stable carotid artery occlusion but did not note any evidence of dissection from what I could see. Will discuss this with Dr. Mohini Vizcarra and see if there is any need to repeat this on an annual basis or if the patient is okay to not have any further evaluation and work-up moving forward. History of Stroke:    I had the pleasure of seeing Justine Duarte today in the office at [facility] neurology [facility] in [facility]. He is presenting for an office visit follow-up in regard to his history of stroke. At this point in time he is doing well and not noticing any new concerning symptoms for stroke at this time. No concern for residual deficits, did have some left facial weakness when he initially had a stroke which did seem to improve. He has noticed that from the stroke his voice has changed as well but again nothing that is particularly concerning for him at this time. He is doing well with all his stroke risk factors by taking his aspirin and his Lipitor daily. He is monitoring his blood pressure at home and it is usually less than 130/80. His most recent LDL was 66 mg/dL most recent hemoglobin A1c was 5.9%. He is doing well with trying to stay active, he reports that he could be doing better with his diet in regards to the foods that he eats. However, he has lost about 50 pounds over the last few months and has reduced his portion size for what he has been eating at home as well. No history of smoking and no issues with sleep apnea to note of at this time. - For ongoing stroke prevention continue: Aspirin 81 mg once daily, Lipitor 40 mg once daily  - Discussed the importance of antiplatelet management with the patient to prevent future strokes.    - Recommend to check blood pressure occasionally away from the doctor's office to make sure that those numbers are typically less than 130/80. If they are frequently higher than that, we recommend checking a little more often and to follow up with primary care team   - Will defer to primary care team for monitoring of cholesterol panel and blood sugar numbers with target LDL cholesterol of less than 70 and hemoglobin A1c less than 7%  - Recommend following a low salt, mediterranean diet   - Recommend routine physical exercise as tolerated     We will plan for him to return to the office in 1 year time to see on of the APPs or Dr. Jarvis Jiménez but would be happy to see him sooner if the need should arise. If he has any symptoms concerning for TIA or stroke including sudden painless loss of vision or double vision, difficulty speaking or swallowing, vertigo/room spinning that does not quickly resolve, or weakness/numbness/loss of coordination affecting 1 side of the face or body he should proceed by ambulance to the nearest emergency room immediately.

## 2023-09-07 NOTE — PROGRESS NOTES
Patient ID: María Elena Oconnor. is a 77 y.o. male who presents to the [address] [place] [address]. Assessment/Plan:    Carotid artery occlusion/dissection:    -We will talk to Dr. Kandi Kowalski in regards that the patient needs a another CTA head and neck to monitor his carotid artery occlusion/dissection. His last CTA head and neck was in March 2023, came back with the stable carotid artery occlusion but did not note any evidence of dissection from what I could see. Will discuss this with Dr. Kandi Kowalski and see if there is any need to repeat this on an annual basis or if the patient is okay to not have any further evaluation and work-up moving forward. History of Stroke:    I had the pleasure of seeing Shaun Duverney today in the office at [facility] in [facility]. He is presenting for an office visit follow-up in regard to his history of stroke. At this point in time he is doing well and not noticing any new concerning symptoms for stroke at this time. No concern for residual deficits, did have some left facial weakness when he initially had a stroke which did seem to improve. He has noticed that from the stroke his voice has changed as well but again nothing that is particularly concerning for him at this time. He is doing well with all his stroke risk factors by taking his aspirin and his Lipitor daily. He is monitoring his blood pressure at home and it is usually less than 130/80. His most recent LDL was 66 mg/dL most recent hemoglobin A1c was 5.9%. He is doing well with trying to stay active, he reports that he could be doing better with his diet in regards to the foods that he eats. However, he has lost about 50 pounds over the last few months and has reduced his portion size for what he has been eating at home as well. No history of smoking and no issues with sleep apnea to note of at this time.       - For ongoing stroke prevention continue: Aspirin 81 mg once daily, Lipitor 40 mg once daily  - Discussed the importance of antiplatelet management with the patient to prevent future strokes. - Recommend to check blood pressure occasionally away from the doctor's office to make sure that those numbers are typically less than 130/80. If they are frequently higher than that, we recommend checking a little more often and to follow up with primary care team   - Will defer to primary care team for monitoring of cholesterol panel and blood sugar numbers with target LDL cholesterol of less than 70 and hemoglobin A1c less than 7%  - Recommend following a low salt, mediterranean diet   - Recommend routine physical exercise as tolerated     We will plan for him to return to the office in 1 year time to see on of the APPs or Dr. Mei Loco but would be happy to see him sooner if the need should arise. If he has any symptoms concerning for TIA or stroke including sudden painless loss of vision or double vision, difficulty speaking or swallowing, vertigo/room spinning that does not quickly resolve, or weakness/numbness/loss of coordination affecting 1 side of the face or body he should proceed by ambulance to the nearest emergency room immediately. Subjective:    HPI      For Review:    Patient was last seen in the office on 0/03/2023 by Veronica Foley, [address] [place] [address]. Patient was presenting for follow-up evaluation in regard to his prior stroke in May 2022. 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 Patient had a right MCA occlusion and left vertebral artery stenosis/occlusion as well. At her last appointment and reported his voice and speech changes as residual deficits from his previous stroke. Patient was doing well controlling his vascular risk factors at this time. Noted that he had lost about 40 pounds and was continuing to stay on his diet and keep active.   Was recommended that the patient continue with his aspirin and atorvastatin as recommended for secondary stroke prevention. Patient had last completed a CTA head and neck on 09/14/2022 at the last visit, was recommended to have this imaging repeated to reevaluate the patient's occlusion and dissection of his internal carotid artery. Patient had a repeat CTA in March 2023 that was ordered by Kennedy Gamez, did show the same area of right internal carotid artery occlusion and did not note any still existing dissection on the imaging. Interval History:    New stroke symptoms/residual symptoms:    Any new, sudden onset weakness, numbness, facial droop, slurred speech, difficulty speaking, trouble swallowing, persistent vertigo, or sudden double vision or vision loss? No new stroke symptoms. Residual symptoms include: left sided facial weakness (not having this deficit anymore), currently noticing just change in his voice     Stroke Etiology and Risk Factor modification: This was a(n) embolic stroke, most likely related to Hypotensive/watershed    Stroke risk factors were evaluated including: hyperlipidemia, hypertension, prediabetes    AP/AC therapy: aspirin 81 mg once daily. No bleeding or bruising. Statin therapy: Lipitor 40 mg once daily     Blood pressure today and as of late: 124/80, checking this almost every day at this time. States that his BP is relatively around the same. Most recent LDL: 66 mg/dL    Most recent hemoglobin A1C: 5.9%    Cardiology evaluation? Any cardiac monitoring required?: No cardiology follow up    Endocrinology evaluation? Following proper glycemic treatment/diet? No endocrinology follow up     Lifestyle history/modifications:    Diet/Exercise regimen: Diet is doing much better at this time, dropped about 50 lbs at this time. Average 30 miles a week walking. Any physical therapy, occupational therapy or speech therapy performed/required at this time?: No PT/OT    Any difficulty with sleep?  No sleep issues, but can not sleep in very late. Sleeping few hours at a time, second time falling asleep after waking up is tough. Any history of sleep apnea apnea? CPAP compliance?: Does not think sleep apnea is a concern since losing weight     Post-stroke depression/anxiety? No depression or anxiety     Any history of smoking?  No history of smoking       Lab Results   Component Value Date/Time    CHOLESTEROL 117 11/22/2022 08:41 AM     Lab Results   Component Value Date/Time    TRIG 70 11/22/2022 08:41 AM    TRIG 146 10/06/2016 07:45 AM     Lab Results   Component Value Date/Time    HDL 37 (L) 11/22/2022 08:41 AM    HDL 33 (L) 10/06/2016 07:45 AM     Lab Results   Component Value Date/Time    LDLCALC 66 11/22/2022 08:41 AM       Lab Results   Component Value Date/Time    HGBA1C 5.9 (H) 05/08/2022 10:44 PM     Lab Results   Component Value Date/Time     05/08/2022 10:44 PM           Past Medical History:   Diagnosis Date   • Elevated cholesterol with high triglycerides    • Stroke [facility])        Current Outpatient Medications:   •  amLODIPine (NORVASC) 5 mg tablet, Take 1 tablet (5 mg total) by mouth daily, Disp: 90 tablet, Rfl: 3  •  aspirin (ECOTRIN LOW STRENGTH) 81 mg EC tablet, Take 1 tablet (81 mg total) by mouth in the morning., Disp: , Rfl: 0  •  atorvastatin (LIPITOR) 40 mg tablet, Take 1 tablet (40 mg total) by mouth every evening, Disp: 90 tablet, Rfl: 0  •  fenofibrate (TRICOR) 48 mg tablet, Take 1 tablet (48 mg total) by mouth daily, Disp: 90 tablet, Rfl: 0  •  lisinopril (ZESTRIL) 5 mg tablet, Take 1 tablet (5 mg total) by mouth daily, Disp: 90 tablet, Rfl: 1  •  fenofibrate (TRICOR) 48 mg tablet, TAKE 1 TABLET BY MOUTH EVERY DAY, Disp: 90 tablet, Rfl: 3  •  fenofibrate (TRICOR) 48 mg tablet, Take 1 tablet (48 mg total) by mouth daily, Disp: 90 tablet, Rfl: 0     Objective:    Physical Exam:                                                                 Vitals:            Constitutional:    /80 (BP Location: Left arm, Patient Position: Sitting, Cuff Size: Adult)   Pulse 71   Temp 97.6 °F (36.4 °C) (Temporal)   Ht 6' (1.829 m)   Wt 93 kg (205 lb 1.6 oz)   SpO2 98%   BMI 27.82 kg/m²   BP Readings from Last 3 Encounters:   09/07/23 124/80   04/21/23 124/76   01/03/23 131/72     Pulse Readings from Last 3 Encounters:   09/07/23 71   04/21/23 65   01/03/23 81         Well developed, well nourished, well groomed. No dysmorphic features. Psychiatric:  Normal behavior and appropriate affect        Neurological Examination:     Mental status/cognitive function:   Orientated to time, place and person. Recent and remote memory intact. Attention span and concentration as well as fund of knowledge are appropriate for age. Normal language and spontaneous speech. Cranial Nerves:  II-visual fields full. III, IV, VI-Pupils were equal, round, and reactive to light and accomodation. Extraocular movements were full and conjugate without nystagmus. Conjugate gaze, normal smooth pursuits, normal saccades   V-facial sensation symmetric. VII-facial expression symmetric, intact forehead wrinkle, strong eye closure, symmetric smile    VIII-hearing grossly intact bilaterally   IX, X-palate elevation symmetric, no dysarthria. XI-shoulder shrug strength intact    XII-tongue protrusion midline. Motor Exam: symmetric bulk and tone throughout, no pronator drift. Power/strength 5/5 bilateral upper and lower extremities, no atrophy, fasciculations or abnormal movements noted. Sensory: grossly intact light touch in all extremities. Reflexes: brachioradialis 2+, biceps 2+, knee 2+, bilaterally  Coordination: Finger nose finger intact bilaterally, no apparent dysmetria, ataxia or tremor noted  Gait: steady casual and tandem gait. ROS:    Review of Systems   Constitutional: Negative for appetite change, fatigue and fever. HENT: Negative. Negative for hearing loss, tinnitus, trouble swallowing and voice change. Eyes: Negative. Negative for photophobia, pain and visual disturbance. Respiratory: Negative. Negative for shortness of breath. Cardiovascular: Negative. Negative for palpitations. Gastrointestinal: Negative. Negative for nausea and vomiting. Endocrine: Negative. Negative for cold intolerance. Genitourinary: Negative. Negative for dysuria, frequency and urgency. Musculoskeletal: Negative for back pain, gait problem, myalgias and neck pain. Voice change   Skin: Negative. Negative for rash. Allergic/Immunologic: Negative. Neurological: Negative. Negative for dizziness, tremors, seizures, syncope, facial asymmetry, speech difficulty, weakness, light-headedness, numbness and headaches. Hematological: Negative. Does not bruise/bleed easily. Psychiatric/Behavioral: Negative. Negative for confusion, hallucinations and sleep disturbance.        I have spent 25 minutes today on this case including chart review, performing history and exam, patient counseling, and documentation/communication      Rikki Noguera PA-C  9/7/2023 10:37 AM

## 2023-09-07 NOTE — PROGRESS NOTES
Review of Systems   Constitutional: Negative for appetite change, fatigue and fever. HENT: Negative. Negative for hearing loss, tinnitus, trouble swallowing and voice change. Eyes: Negative. Negative for photophobia, pain and visual disturbance. Respiratory: Negative. Negative for shortness of breath. Cardiovascular: Negative. Negative for palpitations. Gastrointestinal: Negative. Negative for nausea and vomiting. Endocrine: Negative. Negative for cold intolerance. Genitourinary: Negative. Negative for dysuria, frequency and urgency. Musculoskeletal: Negative for back pain, gait problem, myalgias and neck pain. Voice change   Skin: Negative. Negative for rash. Allergic/Immunologic: Negative. Neurological: Negative. Negative for dizziness, tremors, seizures, syncope, facial asymmetry, speech difficulty, weakness, light-headedness, numbness and headaches. Hematological: Negative. Does not bruise/bleed easily. Psychiatric/Behavioral: Negative. Negative for confusion, hallucinations and sleep disturbance.

## 2023-10-17 DIAGNOSIS — I63.9 ISCHEMIC STROKE (HCC): ICD-10-CM

## 2023-10-17 DIAGNOSIS — I10 ESSENTIAL HYPERTENSION: ICD-10-CM

## 2023-10-17 RX ORDER — AMLODIPINE BESYLATE 5 MG/1
5 TABLET ORAL DAILY
Qty: 90 TABLET | Refills: 1 | Status: SHIPPED | OUTPATIENT
Start: 2023-10-17

## 2023-10-17 RX ORDER — LISINOPRIL 5 MG/1
5 TABLET ORAL DAILY
Qty: 90 TABLET | Refills: 0 | Status: SHIPPED | OUTPATIENT
Start: 2023-10-17 | End: 2024-04-14

## 2023-10-30 ENCOUNTER — OFFICE VISIT (OUTPATIENT)
Dept: FAMILY MEDICINE CLINIC | Facility: CLINIC | Age: 68
End: 2023-10-30
Payer: MEDICARE

## 2023-10-30 VITALS
BODY MASS INDEX: 27.9 KG/M2 | WEIGHT: 206 LBS | TEMPERATURE: 97.2 F | OXYGEN SATURATION: 96 % | HEART RATE: 61 BPM | DIASTOLIC BLOOD PRESSURE: 70 MMHG | HEIGHT: 72 IN | SYSTOLIC BLOOD PRESSURE: 110 MMHG

## 2023-10-30 DIAGNOSIS — E78.2 MIXED HYPERLIPIDEMIA: Chronic | ICD-10-CM

## 2023-10-30 DIAGNOSIS — I77.71 DISSECTION OF CAROTID ARTERY (HCC): ICD-10-CM

## 2023-10-30 DIAGNOSIS — K63.5 POLYP OF COLON, UNSPECIFIED PART OF COLON, UNSPECIFIED TYPE: ICD-10-CM

## 2023-10-30 DIAGNOSIS — Z12.11 SCREEN FOR COLON CANCER: ICD-10-CM

## 2023-10-30 DIAGNOSIS — I10 HYPERTENSION, ESSENTIAL: Primary | Chronic | ICD-10-CM

## 2023-10-30 PROCEDURE — 99214 OFFICE O/P EST MOD 30 MIN: CPT | Performed by: FAMILY MEDICINE

## 2023-10-30 NOTE — PROGRESS NOTES
Name: Macario Hunt. : 1955      MRN: [id number]  Encounter Provider: Katt Velasco MD  Encounter Date: 10/30/2023   Encounter department: [address] [place] [address]     1. Hypertension, essential  Assessment & Plan:  Well controlled. Continue present care. Monitor diet. Recheck 6m      2. 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[facility])  Assessment & Plan:  Doing well. No new neuro symptoms. Continue present care. Continue to monitor carotids. Recheck 6m      3. Mixed hyperlipidemia  Assessment & Plan:  Discussed need to continue this med considering underlying vascular disease. Monitor labs. Continue present meds. Recheck 6m      4. Screen for colon cancer  -     Ambulatory Referral to Colorectal Surgery; Future    5. Polyp of colon, unspecified part of colon, unspecified type  -     Ambulatory Referral to Colorectal Surgery; Future      Depression Screening and Follow-up Plan: Patient was screened for depression during today's encounter. They screened negative with a PHQ-2 score of 0. Subjective     f/u multiple med issues  - pt continues to do well  I reviewed blood pressure and weights from September through present. Blood pressures averaging low 100s to 120s/60s to 80. Weight has been stable ranging between 200 and 202. Patient continues to walk approximately 4 miles a day. Patient denies any chest pains, palpitations, lightheadedness or other cardiac symptoms with or without exertion  - pt continues to follow-up with neurology. No new neurologic symptoms are noted. - pt denies any new GI or  complaints. Pt due for colonoscopy. - no other concerns        Review of Systems   Constitutional: Negative. HENT: Negative. Eyes: Negative. Respiratory: Negative. Cardiovascular: Negative. Gastrointestinal: Negative. Endocrine: Negative. Genitourinary: Negative. Musculoskeletal: Negative. Skin: Negative.     Allergic/Immunologic: Negative. Neurological: Negative. Hematological: Negative. Psychiatric/Behavioral: Negative. Past Medical History:   Diagnosis Date   • Elevated cholesterol with high triglycerides    • Stroke [facility])      Past Surgical History:   Procedure Laterality Date   • HERNIA REPAIR Left     inguinal   • OR COLONOSCOPY FLX DX W/COLLJ SPEC WHEN PFRMD N/A 2/24/2017    Procedure: COLONOSCOPY;  Surgeon: Flaquito Murphy MD;  [facility]; Service: Colorectal     Family History   Problem Relation Age of Onset   • Diabetes Mother    • Ulcerative colitis Son    • Stroke Father         SYNDROME      Social History     Socioeconomic History   • Marital status: /Civil Union     Spouse name: None   • Number of children: None   • Years of education: None   • Highest education level: None   Occupational History   • None   Tobacco Use   • Smoking status: Never     Passive exposure: Past   • Smokeless tobacco: Never   • Tobacco comments:     No hisrory.    Vaping Use   • Vaping Use: Never used   Substance and Sexual Activity   • Alcohol use: Not Currently     Alcohol/week: 1.0 standard drink of alcohol     Types: 1 Glasses of wine per week     Comment: Rarely   • Drug use: Never   • Sexual activity: Yes     Partners: Female     Birth control/protection: Condom Male   Other Topics Concern   • None   Social History Narrative    Denied: history of daily coffee consumption    Daily cola consumption    Denied: history of daily tea consumption      Social Determinants of Health     Financial Resource Strain: Low Risk  (4/14/2023)    Overall Financial Resource Strain (CARDIA)    • Difficulty of Paying Living Expenses: Not hard at all   Food Insecurity: No Food Insecurity (5/10/2022)    Hunger Vital Sign    • Worried About Running Out of Food in the Last Year: Never true    • Ran Out of Food in the Last Year: Never true   Transportation Needs: No Transportation Needs (4/14/2023)    PRAPARE - Transportation    • Lack of Transportation (Medical): No    • Lack of Transportation (Non-Medical): No   Physical Activity: Not on file   Stress: Not on file   Social Connections: Not on file   Intimate Partner Violence: Not on file   Housing Stability: Low Risk  (5/10/2022)    Housing Stability Vital Sign    • Unable to Pay for Housing in the Last Year: No    • Number of Places Lived in the Last Year: 1    • Unstable Housing in the Last Year: No     Current Outpatient Medications on File Prior to Visit   Medication Sig   • amLODIPine (NORVASC) 5 mg tablet TAKE 1 TABLET BY MOUTH EVERY DAY   • aspirin (ECOTRIN LOW STRENGTH) 81 mg EC tablet Take 1 tablet (81 mg total) by mouth in the morning. • atorvastatin (LIPITOR) 40 mg tablet Take 1 tablet (40 mg total) by mouth every evening   • fenofibrate (TRICOR) 48 mg tablet TAKE 1 TABLET BY MOUTH EVERY DAY   • lisinopril (ZESTRIL) 5 mg tablet Take 1 tablet (5 mg total) by mouth daily   • [DISCONTINUED] clopidogrel (PLAVIX) 75 mg tablet Take 1 tablet (75 mg total) by mouth in the morning. Allergies   Allergen Reactions   • Penicillins Rash     Immunization History   Administered Date(s) Administered   • COVID-19 MODERNA VACC 0.5 ML IM 01/19/2021, 02/16/2021   • INFLUENZA 09/23/2021, 11/01/2022, 10/22/2023   • Influenza Quadrivalent, 6-35 Months IM 11/07/2017   • Influenza, high dose seasonal 0.7 mL 09/26/2018, 09/19/2019   • Pneumococcal Conjugate 13-Valent 09/26/2018   • Tdap 09/25/2020       Objective     /70 (BP Location: Left arm, Patient Position: Sitting, Cuff Size: Large)   Pulse 61   Temp (!) 97.2 °F (36.2 °C)   Ht 6' (1.829 m)   Wt 93.4 kg (206 lb)   SpO2 96%   BMI 27.94 kg/m²     Physical Exam  Vitals reviewed. HENT:      Head: Normocephalic. Right Ear: Tympanic membrane, ear canal and external ear normal.      Left Ear: Tympanic membrane, ear canal and external ear normal.      Nose: Nose normal. No congestion.       Mouth/Throat:      Mouth: Mucous membranes are moist.   Eyes:      Extraocular Movements: Extraocular movements intact. Conjunctiva/sclera: Conjunctivae normal.      Pupils: Pupils are equal, round, and reactive to light. Neck:      Vascular: No carotid bruit. Cardiovascular:      Rate and Rhythm: Normal rate and regular rhythm. Pulses: Normal pulses. Pulmonary:      Effort: Pulmonary effort is normal.   Abdominal:      General: Abdomen is flat. There is no distension. Palpations: Abdomen is soft. There is no mass. Tenderness: There is no abdominal tenderness. There is no right CVA tenderness or left CVA tenderness. Musculoskeletal:         General: No swelling, tenderness or deformity. Cervical back: Normal range of motion. No tenderness. Right lower leg: No edema. Left lower leg: No edema. Lymphadenopathy:      Cervical: No cervical adenopathy. Skin:     General: Skin is warm. Capillary Refill: Capillary refill takes less than 2 seconds. Neurological:      Mental Status: He is alert and oriented to person, place, and time. Cranial Nerves: No cranial nerve deficit. Sensory: No sensory deficit. Motor: No weakness (L arm improved).       Gait: Gait normal.      Deep Tendon Reflexes: Reflexes normal.   Psychiatric:         Mood and Affect: Mood normal.      Comments: PHQ-2/9 Depression Screening    Little interest or pleasure in doing things: 0 - not at all  Feeling down, depressed, or hopeless: 0 - not at all  PHQ-2 Score: 0  PHQ-2 Interpretation: Negative depression screen       Da Espinoza MD

## 2023-10-31 NOTE — ASSESSMENT & PLAN NOTE
Discussed need to continue this med considering underlying vascular disease. Monitor labs. Continue present meds.  Recheck 6m

## 2023-11-01 ENCOUNTER — APPOINTMENT (OUTPATIENT)
Dept: LAB | Facility: CLINIC | Age: 68
End: 2023-11-01
Payer: MEDICARE

## 2023-11-01 DIAGNOSIS — I10 HYPERTENSION, ESSENTIAL: Chronic | ICD-10-CM

## 2023-11-01 DIAGNOSIS — Z12.5 SCREENING FOR PROSTATE CANCER: ICD-10-CM

## 2023-11-01 LAB
ALBUMIN SERPL BCP-MCNC: 3.9 G/DL (ref 3.5–5)
ALP SERPL-CCNC: 59 U/L (ref 34–104)
ALT SERPL W P-5'-P-CCNC: 14 U/L (ref 7–52)
ANION GAP SERPL CALCULATED.3IONS-SCNC: 9 MMOL/L
AST SERPL W P-5'-P-CCNC: 23 U/L (ref 13–39)
BASOPHILS # BLD AUTO: 0.06 THOUSANDS/ÂΜL (ref 0–0.1)
BASOPHILS NFR BLD AUTO: 1 % (ref 0–1)
BILIRUB SERPL-MCNC: 0.59 MG/DL (ref 0.2–1)
BUN SERPL-MCNC: 14 MG/DL (ref 5–25)
CALCIUM SERPL-MCNC: 9.2 MG/DL (ref 8.4–10.2)
CHLORIDE SERPL-SCNC: 108 MMOL/L (ref 96–108)
CHOLEST SERPL-MCNC: 96 MG/DL
CO2 SERPL-SCNC: 26 MMOL/L (ref 21–32)
CREAT SERPL-MCNC: 1.03 MG/DL (ref 0.6–1.3)
EOSINOPHIL # BLD AUTO: 0.33 THOUSAND/ÂΜL (ref 0–0.61)
EOSINOPHIL NFR BLD AUTO: 5 % (ref 0–6)
ERYTHROCYTE [DISTWIDTH] IN BLOOD BY AUTOMATED COUNT: 12.6 % (ref 11.6–15.1)
GFR SERPL CREATININE-BSD FRML MDRD: 73 ML/MIN/1.73SQ M
GLUCOSE P FAST SERPL-MCNC: 83 MG/DL (ref 65–99)
HCT VFR BLD AUTO: 46.1 % (ref 36.5–49.3)
HDLC SERPL-MCNC: 36 MG/DL
HGB BLD-MCNC: 15 G/DL (ref 12–17)
IMM GRANULOCYTES # BLD AUTO: 0.03 THOUSAND/UL (ref 0–0.2)
IMM GRANULOCYTES NFR BLD AUTO: 1 % (ref 0–2)
LDLC SERPL CALC-MCNC: 49 MG/DL (ref 0–100)
LYMPHOCYTES # BLD AUTO: 1.38 THOUSANDS/ÂΜL (ref 0.6–4.47)
LYMPHOCYTES NFR BLD AUTO: 21 % (ref 14–44)
MCH RBC QN AUTO: 30.7 PG (ref 26.8–34.3)
MCHC RBC AUTO-ENTMCNC: 32.5 G/DL (ref 31.4–37.4)
MCV RBC AUTO: 94 FL (ref 82–98)
MONOCYTES # BLD AUTO: 0.58 THOUSAND/ÂΜL (ref 0.17–1.22)
MONOCYTES NFR BLD AUTO: 9 % (ref 4–12)
NEUTROPHILS # BLD AUTO: 4.14 THOUSANDS/ÂΜL (ref 1.85–7.62)
NEUTS SEG NFR BLD AUTO: 63 % (ref 43–75)
NONHDLC SERPL-MCNC: 60 MG/DL
NRBC BLD AUTO-RTO: 0 /100 WBCS
PLATELET # BLD AUTO: 250 THOUSANDS/UL (ref 149–390)
PMV BLD AUTO: 10.5 FL (ref 8.9–12.7)
POTASSIUM SERPL-SCNC: 4 MMOL/L (ref 3.5–5.3)
PROT SERPL-MCNC: 6.5 G/DL (ref 6.4–8.4)
PSA SERPL-MCNC: 1.56 NG/ML (ref 0–4)
RBC # BLD AUTO: 4.89 MILLION/UL (ref 3.88–5.62)
SODIUM SERPL-SCNC: 143 MMOL/L (ref 135–147)
TRIGL SERPL-MCNC: 53 MG/DL
WBC # BLD AUTO: 6.52 THOUSAND/UL (ref 4.31–10.16)

## 2023-11-01 PROCEDURE — 36415 COLL VENOUS BLD VENIPUNCTURE: CPT

## 2023-11-01 PROCEDURE — 80053 COMPREHEN METABOLIC PANEL: CPT

## 2023-11-01 PROCEDURE — G0103 PSA SCREENING: HCPCS

## 2023-11-01 PROCEDURE — 80061 LIPID PANEL: CPT

## 2023-11-01 PROCEDURE — 85025 COMPLETE CBC W/AUTO DIFF WBC: CPT

## 2023-11-04 DIAGNOSIS — E78.2 MIXED HYPERLIPIDEMIA: ICD-10-CM

## 2023-11-05 DIAGNOSIS — E78.2 MIXED HYPERLIPIDEMIA: ICD-10-CM

## 2023-11-05 NOTE — TELEPHONE ENCOUNTER
Medication Refill Request     Name fenofibrate (TRICOR)    Dose/Frequency 48 mg/TAKE 1 TABLET BY MOUTH EVERY DAY  Quantity 90 tablet  Verified pharmacy   [x]  Verified ordering Provider   [x]  Does patient have enough for the next 3 days?  Yes [x] No []

## 2023-11-06 ENCOUNTER — TELEPHONE (OUTPATIENT)
Age: 68
End: 2023-11-06

## 2023-11-06 ENCOUNTER — PREP FOR PROCEDURE (OUTPATIENT)
Age: 68
End: 2023-11-06

## 2023-11-06 DIAGNOSIS — Z86.010 HISTORY OF COLON POLYPS: Primary | ICD-10-CM

## 2023-11-06 RX ORDER — FENOFIBRATE 48 MG/1
48 TABLET, COATED ORAL DAILY
Qty: 90 TABLET | Refills: 0 | Status: SHIPPED | OUTPATIENT
Start: 2023-11-06

## 2023-11-06 NOTE — TELEPHONE ENCOUNTER
11/06/23  Screened by: Sally Quiñonez    Referring Provider Misha Zimmer    Pre- Screening: There is no height or weight on file to calculate BMI. Has patient been referred for a routine screening Colonoscopy? yes  Is the patient between 43-73 years old? yes      Previous Colonoscopy yes   If yes:    Date: 2/2017    Facility:     Reason:       SCHEDULING STAFF: If the patient is between 45yrs-49yrs, please advise patient to confirm benefits/coverage with their insurance company for a routine screening colonoscopy, some insurance carriers will only cover at [address] [place] [address] or older. If the patient is over 66years old, please schedule an office visit. Does the patient want to see a Gastroenterologist prior to their procedure OR are they having any GI symptoms? no    Has the patient been hospitalized or had abdominal surgery in the past 6 months? no    Does the patient use supplemental oxygen? no    Does the patient take Coumadin, Lovenox, Plavix, Elliquis, Xarelto, or other blood thinning medication? no    Has the patient had a stroke, cardiac event, or stent placed in the past year? no    PT PASSED OA    SCHEDULING STAFF: If patient answers NO to above questions, then schedule procedure. If patient answers YES to above questions, then schedule office appointment. If patient is between 45yrs - 49yrs, please advise patient that we will have to confirm benefits & coverage with their insurance company for a routine screening colonoscopy.

## 2023-11-06 NOTE — TELEPHONE ENCOUNTER
Scheduled date of colonoscopy (as of today):4/10/24  Physician performing colonoscopy:DR. BATES [facility]  [facility] colonoscopy:[facility]  Bowel prep reviewed with patient:ELIZABETH/RAMONA SENT TO PTS MYCHART  Instructions reviewed with patient by:VINAYAK  Clearances: NA-PT HAD A STROKE ON 5/8/2022     WIFE GEOVANY  [phone]

## 2023-11-20 DIAGNOSIS — I63.9 ISCHEMIC STROKE (HCC): ICD-10-CM

## 2023-11-20 RX ORDER — ATORVASTATIN CALCIUM 40 MG/1
40 TABLET, FILM COATED ORAL EVERY EVENING
Qty: 90 TABLET | Refills: 0 | Status: SHIPPED | OUTPATIENT
Start: 2023-11-20 | End: 2024-02-18

## 2023-11-20 NOTE — TELEPHONE ENCOUNTER
Reason for call:   [x] Refill   [] Prior Auth  [] Other:     Office:   [x] PCP/Provider - Keyona Ash MD   [] Specialty/Provider -     Medication:     atorvastatin (LIPITOR)        Dose/Frequency:     40 mg, Oral, Every evening       Quantity: 90    Pharmacy: [address] [place] [address],[address]     Does the patient have enough for 3 days?    [x] Yes   [] No - Send as HP to POD

## 2024-01-05 DIAGNOSIS — I10 ESSENTIAL HYPERTENSION: ICD-10-CM

## 2024-01-05 DIAGNOSIS — I63.9 ISCHEMIC STROKE (HCC): ICD-10-CM

## 2024-01-05 RX ORDER — AMLODIPINE BESYLATE 5 MG/1
5 TABLET ORAL DAILY
Qty: 90 TABLET | Refills: 0 | Status: SHIPPED | OUTPATIENT
Start: 2024-01-05

## 2024-01-05 RX ORDER — LISINOPRIL 5 MG/1
5 TABLET ORAL DAILY
Qty: 90 TABLET | Refills: 0 | Status: SHIPPED | OUTPATIENT
Start: 2024-01-05 | End: 2024-07-03

## 2024-01-05 NOTE — TELEPHONE ENCOUNTER
Reason for call: patient requested medication via my chart called in to request medication as well routing to pod for protocols patient also asked for lisinopril but med was already   [x] Refill E-Prescribing Status: Receipt confirmed by pharmacy (1/5/2024  2:45 PM EST)   [] Prior Auth  [] Other:     Office:  [facility]  [x] PCP/Provider - Lai Capone MD  [] Specialty/Provider -     Medication:   amLODIPine (NORVASC) 5 mg tablet   Dose: 5 mg Route: Oral Frequency: Daily  Dispense Quantity: 90 tablet Sig: TAKE 1 TABLET BY MOUTH EVERY DAY    [facility]  Does the patient have enough for 3 days?   [x] Yes   [] No - Send as HP to POD

## 2024-01-12 ENCOUNTER — TELEPHONE (OUTPATIENT)
Dept: OTHER | Facility: HOSPITAL | Age: 69
End: 2024-01-12

## 2024-01-12 NOTE — TELEPHONE ENCOUNTER
"Patient called rx refill line asking if his medication (amlodipine and lisinopril) are \"ready to be picked up?\" I informed him the prescriptions were sent to [facility] on 1/5/24. Patient said he will check with the pharmacy.   "

## 2024-02-19 DIAGNOSIS — I63.9 ISCHEMIC STROKE (HCC): ICD-10-CM

## 2024-02-19 RX ORDER — ATORVASTATIN CALCIUM 40 MG/1
40 TABLET, FILM COATED ORAL EVERY EVENING
Qty: 90 TABLET | Refills: 1 | Status: SHIPPED | OUTPATIENT
Start: 2024-02-19 | End: 2024-08-17

## 2024-02-29 DIAGNOSIS — E78.2 MIXED HYPERLIPIDEMIA: ICD-10-CM

## 2024-03-01 RX ORDER — FENOFIBRATE 48 MG/1
48 TABLET, COATED ORAL DAILY
Qty: 90 TABLET | Refills: 1 | Status: SHIPPED | OUTPATIENT
Start: 2024-03-01

## 2024-04-01 ENCOUNTER — TELEPHONE (OUTPATIENT)
Age: 69
End: 2024-04-01

## 2024-04-01 NOTE — TELEPHONE ENCOUNTER
the patient called he wanted to know if he should stop his 81 mg ASA prior to colonoscopy.  I advised he can continue to take his ASA.  Patient also questioned if he needs to remove his wedding ring.  I advised that it is preferred if he can remove it prior to the procedure, however if he can not they will be able to help him at the facility location.

## 2024-04-10 ENCOUNTER — ANESTHESIA (OUTPATIENT)
Dept: GASTROENTEROLOGY | Facility: HOSPITAL | Age: 69
End: 2024-04-10

## 2024-04-10 ENCOUNTER — HOSPITAL ENCOUNTER (OUTPATIENT)
Dept: GASTROENTEROLOGY | Facility: HOSPITAL | Age: 69
Setting detail: OUTPATIENT SURGERY
Discharge: HOME | End: 2024-04-10
Attending: COLON & RECTAL SURGERY
Payer: MEDICARE

## 2024-04-10 ENCOUNTER — ANESTHESIA EVENT (OUTPATIENT)
Dept: GASTROENTEROLOGY | Facility: HOSPITAL | Age: 69
End: 2024-04-10

## 2024-04-10 VITALS
HEART RATE: 66 BPM | OXYGEN SATURATION: 96 % | RESPIRATION RATE: 20 BRPM | TEMPERATURE: 97.2 F | DIASTOLIC BLOOD PRESSURE: 79 MMHG | BODY MASS INDEX: 27.5 KG/M2 | WEIGHT: 203 LBS | HEIGHT: 72 IN | SYSTOLIC BLOOD PRESSURE: 116 MMHG

## 2024-04-10 DIAGNOSIS — I63.9 ISCHEMIC STROKE (HCC): ICD-10-CM

## 2024-04-10 DIAGNOSIS — I10 ESSENTIAL HYPERTENSION: ICD-10-CM

## 2024-04-10 DIAGNOSIS — Z86.010 HISTORY OF COLON POLYPS: ICD-10-CM

## 2024-04-10 PROCEDURE — G0105 COLORECTAL SCRN; HI RISK IND: HCPCS | Performed by: COLON & RECTAL SURGERY

## 2024-04-10 RX ORDER — SODIUM CHLORIDE, SODIUM LACTATE, POTASSIUM CHLORIDE, CALCIUM CHLORIDE 600; 310; 30; 20 MG/100ML; MG/100ML; MG/100ML; MG/100ML
50 INJECTION, SOLUTION INTRAVENOUS CONTINUOUS
Status: CANCELLED | OUTPATIENT
Start: 2024-04-10

## 2024-04-10 RX ORDER — LIDOCAINE HYDROCHLORIDE 10 MG/ML
0.5 INJECTION, SOLUTION EPIDURAL; INFILTRATION; INTRACAUDAL; PERINEURAL ONCE AS NEEDED
Status: CANCELLED | OUTPATIENT
Start: 2024-04-10

## 2024-04-10 RX ORDER — LISINOPRIL 5 MG/1
5 TABLET ORAL DAILY
Qty: 90 TABLET | Refills: 0 | Status: SHIPPED | OUTPATIENT
Start: 2024-04-10 | End: 2024-10-07

## 2024-04-10 RX ORDER — SODIUM CHLORIDE 9 MG/ML
INJECTION, SOLUTION INTRAVENOUS CONTINUOUS PRN
Status: DISCONTINUED | OUTPATIENT
Start: 2024-04-10 | End: 2024-04-10

## 2024-04-10 RX ORDER — AMLODIPINE BESYLATE 5 MG/1
5 TABLET ORAL DAILY
Qty: 90 TABLET | Refills: 1 | Status: SHIPPED | OUTPATIENT
Start: 2024-04-10

## 2024-04-10 RX ORDER — LIDOCAINE HYDROCHLORIDE 10 MG/ML
INJECTION, SOLUTION EPIDURAL; INFILTRATION; INTRACAUDAL; PERINEURAL AS NEEDED
Status: DISCONTINUED | OUTPATIENT
Start: 2024-04-10 | End: 2024-04-10

## 2024-04-10 RX ORDER — PROPOFOL 10 MG/ML
INJECTION, EMULSION INTRAVENOUS AS NEEDED
Status: DISCONTINUED | OUTPATIENT
Start: 2024-04-10 | End: 2024-04-10

## 2024-04-10 RX ADMIN — LIDOCAINE HYDROCHLORIDE 300 MG: 10 INJECTION, SOLUTION EPIDURAL; INFILTRATION; INTRACAUDAL; PERINEURAL at 08:21

## 2024-04-10 RX ADMIN — SODIUM CHLORIDE: 0.9 INJECTION, SOLUTION INTRAVENOUS at 07:58

## 2024-04-10 RX ADMIN — PROPOFOL 50 MG: 10 INJECTION, EMULSION INTRAVENOUS at 08:10

## 2024-04-10 RX ADMIN — PROPOFOL 50 MG: 10 INJECTION, EMULSION INTRAVENOUS at 08:05

## 2024-04-10 RX ADMIN — PROPOFOL 20 MG: 10 INJECTION, EMULSION INTRAVENOUS at 08:15

## 2024-04-10 RX ADMIN — LIDOCAINE HYDROCHLORIDE 50 MG: 10 INJECTION, SOLUTION EPIDURAL; INFILTRATION; INTRACAUDAL; PERINEURAL at 07:59

## 2024-04-10 RX ADMIN — PROPOFOL 150 MG: 10 INJECTION, EMULSION INTRAVENOUS at 07:59

## 2024-04-10 NOTE — H&P
History and Physical   Colon and Rectal Surgery   Nick Murillo Jr. 68 y.o. male MRN: [id number]  Unit/Bed#:  Encounter: [id number]  04/10/24   7:52 AM      No chief complaint on file.      ASSESSMENT:  Nick Murillo Jr. is a 68 y.o. male who presents for colonoscopy, personal history adenomatous polyps, last reviewed 2017.  Screening colonoscopy,discussed in a face-to-face, personal, informed consent process, the benefits, alternatives, risks including not limited to bleeding, missed lesion, perforation requiring emergent surgery discussed/understood.      PLAN:  Colonoscopy    History of Present Illness   HPI:  Nick Murillo Jr. is a 68 y.o. male who presents for colonoscopy.    Historical Information   Past Medical History:   Diagnosis Date    Elevated cholesterol with high triglycerides     Stroke (HCC)      Past Surgical History:   Procedure Laterality Date    HERNIA REPAIR Left     inguinal    PA COLONOSCOPY FLX DX W/COLLJ SPEC WHEN PFRMD N/A 2/24/2017    Procedure: COLONOSCOPY;  Surgeon: Marni Hughes MD;  [facility];  Service: Colorectal       Meds/Allergies     (Not in a hospital admission)        Current Outpatient Medications:     amLODIPine (NORVASC) 5 mg tablet, Take 1 tablet (5 mg total) by mouth daily, Disp: 90 tablet, Rfl: 0    aspirin (ECOTRIN LOW STRENGTH) 81 mg EC tablet, Take 1 tablet (81 mg total) by mouth in the morning., Disp: , Rfl: 0    atorvastatin (LIPITOR) 40 mg tablet, Take 1 tablet (40 mg total) by mouth every evening, Disp: 90 tablet, Rfl: 1    fenofibrate (TRICOR) 48 mg tablet, Take 1 tablet (48 mg total) by mouth daily, Disp: 90 tablet, Rfl: 0    lisinopril (ZESTRIL) 5 mg tablet, Take 1 tablet (5 mg total) by mouth daily, Disp: 90 tablet, Rfl: 0    fenofibrate (TRICOR) 48 mg tablet, Take 1 tablet (48 mg total) by mouth daily, Disp: 90 tablet, Rfl: 1    Allergies   Allergen Reactions    Penicillins Rash         Social History   Social History     Substance and Sexual Activity    Alcohol Use Not Currently    Alcohol/week: 1.0 standard drink of alcohol    Types: 1 Glasses of wine per week    Comment: Rarely     Social History     Substance and Sexual Activity   Drug Use Never     Social History     Tobacco Use   Smoking Status Never    Passive exposure: Past   Smokeless Tobacco Never   Tobacco Comments    No hisrory.         Family History:   Family History   Problem Relation Age of Onset    Diabetes Mother     Ulcerative colitis Son     Stroke Father         SYNDROME          Objective     Current Vitals:   Blood Pressure: 128/74 (04/10/24 0704)  Pulse: 76 (04/10/24 0704)  Temperature: (!) 97.1 °F (36.2 °C) (04/10/24 0704)  Temp Source: Tympanic (04/10/24 0704)  Respirations: 16 (04/10/24 0704)  Height: 6' (182.9 cm) (04/10/24 0704)  Weight - Scale: 92.1 kg (203 lb) (04/10/24 0704)  SpO2: 98 % (04/10/24 0704)  No intake or output data in the 24 hours ending 04/10/24 0752    Physical Exam:  General:no distress  Eyes:perrla/eomi  ENT:moist mucus membranes  Neck:supple  Pulm:no increased work of breathing  CV:sinus  Abdomen:soft,nontender  Extremities:no edema

## 2024-04-10 NOTE — ANESTHESIA PREPROCEDURE EVALUATION
Procedure:  COLONOSCOPY    Relevant Problems   CARDIO   (+) 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(HCC)   (+) Hyperlipidemia   (+) Hypertension, essential      Neurology/Sleep   (+) History of stroke      No residual weakness from stroke    Denies recent fever, cough or other symptom of upper respiratory tract infection.    Confirmed NPO appropriate    Physical Exam    Airway    Mallampati score: III  TM Distance: >3 FB       Dental   No notable dental hx     Cardiovascular      Pulmonary      Other Findings      5/9/22 TTE: Normal biventricular systolic function. No significant valvular disease    Anesthesia Plan  ASA Score- 2     Anesthesia Type- IV sedation with anesthesia with ASA Monitors.         Additional Monitors:     Airway Plan:     Comment: I discussed the risks and benefits of IV sedation anesthesia including the possibility of the need to convert to general anesthesia and the potential risk of awareness.       Plan Factors-Exercise tolerance (METS): >4 METS.Exercise comment: Able to climb two flights of stairs without cardiopulmonary limitation.    Chart reviewed.   Existing labs reviewed.     Patient is not a current smoker.  Patient did not smoke on day of surgery.            Induction- intravenous.    Postoperative Plan-     Informed Consent- Anesthetic plan and risks discussed with patient.

## 2024-04-10 NOTE — ANESTHESIA POSTPROCEDURE EVALUATION
Post-Op Assessment Note    CV Status:  Stable  Pain Score: 0    Pain management: adequate       Mental Status:  Alert and awake   Hydration Status:  Euvolemic   PONV Controlled:  Controlled   Airway Patency:  Patent     Post Op Vitals Reviewed: Yes    No anethesia notable event occurred.    Staff: Anesthesiologist, CRNA               BP (!) 89/54 (04/10/24 0824)    Temp (!) 97.2 °F (36.2 °C) (04/10/24 0824)    Pulse 73 (04/10/24 0824)   Resp 20 (04/10/24 0824)    SpO2 94 % (04/10/24 0824)

## 2024-05-02 ENCOUNTER — OFFICE VISIT (OUTPATIENT)
Dept: FAMILY MEDICINE CLINIC | Facility: CLINIC | Age: 69
End: 2024-05-02

## 2024-05-02 VITALS
SYSTOLIC BLOOD PRESSURE: 112 MMHG | TEMPERATURE: 97.5 F | BODY MASS INDEX: 28.17 KG/M2 | OXYGEN SATURATION: 98 % | WEIGHT: 208 LBS | HEART RATE: 72 BPM | HEIGHT: 72 IN | DIASTOLIC BLOOD PRESSURE: 70 MMHG

## 2024-05-02 DIAGNOSIS — Z00.00 MEDICARE ANNUAL WELLNESS VISIT, SUBSEQUENT: Primary | ICD-10-CM

## 2024-05-02 DIAGNOSIS — I77.71 DISSECTION OF CAROTID ARTERY (HCC): ICD-10-CM

## 2024-05-02 DIAGNOSIS — E78.2 MIXED HYPERLIPIDEMIA: Chronic | ICD-10-CM

## 2024-05-02 DIAGNOSIS — I10 HYPERTENSION, ESSENTIAL: Chronic | ICD-10-CM

## 2024-05-02 NOTE — PROGRESS NOTES
Assessment and Plan:     Problem List Items Addressed This Visit        Cardiovascular and Mediastinum    Hypertension, essential (Chronic)     Well controlled. Cont present treatment. Monitor labs. Recheck 6m           Relevant Orders    CBC and differential    Comprehensive metabolic panel    Lipid panel    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(HCC)     Unchanged on last CTA (3/23).  Pt without new symptoms noted.  Continue present care. Will reach out to Neuro re; need for further monitoring.  Recheck 6m            Other    Hyperlipidemia (Chronic)     Check labs. Continue atorvastatin and fenofibrate. Adjust meds if not at goal. Recheck 6m        Other Visit Diagnoses     Medicare annual wellness visit, subsequent    -  Primary             Preventive health issues were discussed with patient, and age appropriate screening tests were ordered as noted in patient's After Visit Summary.  Personalized health advice and appropriate referrals for health education or preventive services given if needed, as noted in patient's After Visit Summary.     History of Present Illness:     Patient presents for a Medicare Wellness Visit    f/u multiple med issues and AWV  - pt states that he is doing well  - blood pressures are stable.  Pt still walks regularly. He denies CP, palpitations, lightheadedness or other CV symptoms with or without exertion  - pt denies any new neurologic symptoms. Pt is up to date with Neuro  - pt denies any new GI or  complaints.  Pt is up to date with colonoscopy.   - no other concerns  - AWV done       Patient Care Team:  Lai Capone MD as PCP - General Marni Hughes MD as Endoscopist     Review of Systems:     Review of Systems   Constitutional: Negative.    HENT: Negative.     Eyes: Negative.    Respiratory: Negative.     Cardiovascular: Negative.    Gastrointestinal: Negative.    Endocrine: Negative.    Genitourinary: Negative.    Musculoskeletal: Negative.    Skin: Negative.     Allergic/Immunologic: Negative.    Neurological: Negative.    Hematological: Negative.    Psychiatric/Behavioral: Negative.          Problem List:     Patient Active Problem List   Diagnosis   • Hyperlipidemia   • Hypertension, essential   • History of stroke   • 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(HCC)      Past Medical and Surgical History:     Past Medical History:   Diagnosis Date   • Elevated cholesterol with high triglycerides    • Stroke (HCC)      Past Surgical History:   Procedure Laterality Date   • HERNIA REPAIR Left     inguinal   • SD COLONOSCOPY FLX DX W/COLLJ SPEC WHEN PFRMD N/A 2/24/2017    Procedure: COLONOSCOPY;  Surgeon: Marni Hughes MD;  Location: [facility];  Service: Colorectal      Family History:     Family History   Problem Relation Age of Onset   • Diabetes Mother    • Ulcerative colitis Son    • Stroke Father         SYNDROME       Social History:     Social History     Socioeconomic History   • Marital status: /Civil Union     Spouse name: None   • Number of children: None   • Years of education: None   • Highest education level: None   Occupational History   • None   Tobacco Use   • Smoking status: Never     Passive exposure: Past   • Smokeless tobacco: Never   • Tobacco comments:     No history.   Vaping Use   • Vaping status: Never Used   Substance and Sexual Activity   • Alcohol use: Not Currently     Alcohol/week: 1.0 standard drink of alcohol     Types: 1 Glasses of wine per week     Comment: Rarely   • Drug use: Never   • Sexual activity: Yes     Partners: Female     Birth control/protection: Condom Male   Other Topics Concern   • None   Social History Narrative    Denied: history of daily coffee consumption    Daily cola consumption    Denied: history of daily tea consumption      Social Determinants of Health     Financial Resource Strain: Low Risk  (4/14/2023)    Overall Financial Resource Strain (CARDIA)    • Difficulty of Paying Living Expenses: Not hard at all   Food  Insecurity: Patient Declined (5/2/2024)    Hunger Vital Sign    • Worried About Running Out of Food in the Last Year: Patient declined    • Ran Out of Food in the Last Year: Patient declined   Transportation Needs: Patient Declined (5/2/2024)    PRAPARE - Transportation    • Lack of Transportation (Medical): Patient declined    • Lack of Transportation (Non-Medical): Patient declined   Physical Activity: Not on file   Stress: Not on file   Social Connections: Not on file   Intimate Partner Violence: Not on file   Housing Stability: Patient Declined (5/2/2024)    Housing Stability Vital Sign    • Unable to Pay for Housing in the Last Year: Patient declined    • Number of Places Lived in the Last Year: 1    • Unstable Housing in the Last Year: Patient declined      Medications and Allergies:     Current Outpatient Medications   Medication Sig Dispense Refill   • amLODIPine (NORVASC) 5 mg tablet TAKE 1 TABLET BY MOUTH EVERY DAY 90 tablet 1   • aspirin (ECOTRIN LOW STRENGTH) 81 mg EC tablet Take 1 tablet (81 mg total) by mouth in the morning.  0   • atorvastatin (LIPITOR) 40 mg tablet Take 1 tablet (40 mg total) by mouth every evening 90 tablet 1   • fenofibrate (TRICOR) 48 mg tablet Take 1 tablet (48 mg total) by mouth daily 90 tablet 1   • lisinopril (ZESTRIL) 5 mg tablet Take 1 tablet (5 mg total) by mouth daily 90 tablet 0     No current facility-administered medications for this visit.     Allergies   Allergen Reactions   • Penicillins Rash      Immunizations:     Immunization History   Administered Date(s) Administered   • COVID-19 MODERNA VACC 0.5 ML IM 01/19/2021, 02/16/2021, 10/28/2021, 04/11/2022   • COVID-19 Moderna Vac BIVALENT 12 Yr+ IM 0.5 ML 10/02/2022   • COVID-19 Moderna mRNA Vaccine 12 Yr+ 50 mcg/0.5 mL (Spikevax) 10/07/2023   • INFLUENZA 09/23/2021, 11/01/2022, 10/22/2023   • Influenza Quadrivalent, 6-35 Months IM 11/07/2017   • Influenza, high dose seasonal 0.7 mL 09/26/2018, 09/19/2019   •  Pneumococcal Conjugate 13-Valent 09/26/2018   • Tdap 09/25/2020      Health Maintenance:         Topic Date Due   • Colorectal Cancer Screening  04/09/2029   • Hepatitis C Screening  Completed         Topic Date Due   • Pneumococcal Vaccine: 65+ Years (2 of 2 - PPSV23 or PCV20) 09/26/2019   • COVID-19 Vaccine (7 - 2023-24 season) 12/02/2023      Medicare Screening Tests and Risk Assessments:     Nick is here for his Subsequent Wellness visit. Last Medicare Wellness visit information reviewed, patient interviewed and updates made to the record today.      Health Risk Assessment:   Patient rates overall health as very good. Patient feels that their physical health rating is slightly better. Patient is very satisfied with their life. Eyesight was rated as same. Hearing was rated as same. Patient feels that their emotional and mental health rating is slightly better. Patients states they are sometimes angry. Patient states they are sometimes unusually tired/fatigued. Pain experienced in the last 7 days has been none. Patient states that he has experienced no weight loss or gain in last 6 months.     Depression Screening:   PHQ-2 Score: 0      Fall Risk Screening:   In the past year, patient has experienced: no history of falling in past year      Home Safety:  Patient does not have trouble with stairs inside or outside of their home. Patient has working smoke alarms and has no working carbon monoxide detector. Home safety hazards include: none.     Nutrition:   Current diet is Low Saturated Fat.     Medications:   Patient is not currently taking any over-the-counter supplements. Patient is able to manage medications.     Activities of Daily Living (ADLs)/Instrumental Activities of Daily Living (IADLs):   Walk and transfer into and out of bed and chair?: Yes  Dress and groom yourself?: Yes    Bathe or shower yourself?: Yes    Feed yourself? Yes  Do your laundry/housekeeping?: Yes  Manage your money, pay your bills and  track your expenses?: Yes  Make your own meals?: Yes    Do your own shopping?: Yes    Previous Hospitalizations:   Any hospitalizations or ED visits within the last 12 months?: No      Advance Care Planning:   Living will: Yes    Durable POA for healthcare: Yes    Advanced directive: Yes    Advanced directive counseling given: Yes      Cognitive Screening:   Provider or family/friend/caregiver concerned regarding cognition?: No    PREVENTIVE SCREENINGS      Cardiovascular Screening:    General: Screening Not Indicated and History Lipid Disorder      Diabetes Screening:     General: Screening Current      Colorectal Cancer Screening:     General: Screening Current      Prostate Cancer Screening:    General: Screening Current      Osteoporosis Screening:    General: Patient Declines      Abdominal Aortic Aneurysm (AAA) Screening:    Risk factors include: age between 65-76 yo        Lung Cancer Screening:     General: Screening Not Indicated      Hepatitis C Screening:    General: Screening Current    Screening, Brief Intervention, and Referral to Treatment (SBIRT)    Screening  Typical number of drinks in a day: 1  Typical number of drinks in a week: 1  Interpretation: Low risk drinking behavior.    AUDIT-C Screenin) How often did you have a drink containing alcohol in the past year? monthly or less  2) How many drinks did you have on a typical day when you were drinking in the past year? 0  3) How often did you have 6 or more drinks on one occasion in the past year? never    AUDIT-C Score: 1  Interpretation: Score 0-3 (male): Negative screen for alcohol misuse    Single Item Drug Screening:  How often have you used an illegal drug (including marijuana) or a prescription medication for non-medical reasons in the past year? never    Single Item Drug Screen Score: 0  Interpretation: Negative screen for possible drug use disorder    Time Spent  Time spent screening/evaluating the patient for alcohol misuse: 5  "minutes.     Annual Depression Screening  Time spent screening and evaluating the patient for depression during today's encounter was 5 minutes.    No results found.     Physical Exam:     /70 (BP Location: Left arm, Patient Position: Sitting, Cuff Size: Large)   Pulse 72   Temp 97.5 °F (36.4 °C)   Ht 5' 11.75\" (1.822 m)   Wt 94.3 kg (208 lb)   SpO2 98%   BMI 28.41 kg/m²     Physical Exam  Vitals reviewed.   Constitutional:       Appearance: He is well-developed.   HENT:      Head: Normocephalic and atraumatic.      Right Ear: Tympanic membrane, ear canal and external ear normal.      Left Ear: Tympanic membrane, ear canal and external ear normal.      Nose: Nose normal.      Mouth/Throat:      Mouth: Mucous membranes are moist.   Eyes:      Extraocular Movements: Extraocular movements intact.      Conjunctiva/sclera: Conjunctivae normal.      Pupils: Pupils are equal, round, and reactive to light.   Neck:      Thyroid: No thyromegaly.      Vascular: No JVD.   Cardiovascular:      Rate and Rhythm: Normal rate and regular rhythm.      Pulses: Normal pulses.      Heart sounds: Normal heart sounds. No murmur heard.  Pulmonary:      Effort: Pulmonary effort is normal. No respiratory distress.      Breath sounds: Normal breath sounds. No wheezing or rales.   Abdominal:      General: Bowel sounds are normal. There is no distension.      Palpations: Abdomen is soft. There is no mass.      Tenderness: There is no abdominal tenderness.   Musculoskeletal:         General: No swelling, tenderness or deformity. Normal range of motion.      Cervical back: Normal range of motion and neck supple. No tenderness. No muscular tenderness.      Right lower leg: No edema.      Left lower leg: No edema.   Lymphadenopathy:      Cervical: No cervical adenopathy.   Skin:     General: Skin is warm.      Capillary Refill: Capillary refill takes less than 2 seconds.   Neurological:      Mental Status: He is alert and oriented to " person, place, and time.      Cranial Nerves: No cranial nerve deficit.      Sensory: No sensory deficit.      Motor: No weakness or abnormal muscle tone.      Gait: Gait normal.   Psychiatric:         Mood and Affect: Mood normal.         Behavior: Behavior normal.         Thought Content: Thought content normal.         Judgment: Judgment normal.      Comments: PHQ-2/9 Depression Screening    Little interest or pleasure in doing things: 0 - not at all  Feeling down, depressed, or hopeless: 0 - not at all  PHQ-2 Score: 0  PHQ-2 Interpretation: Negative depression screen                 Lai Capone MD

## 2024-05-02 NOTE — ASSESSMENT & PLAN NOTE
Unchanged on last CTA (3/23).  Pt without new symptoms noted.  Continue present care. Will reach out to Neuro re; need for further monitoring.  Recheck 6m

## 2024-05-03 ENCOUNTER — TELEPHONE (OUTPATIENT)
Dept: FAMILY MEDICINE CLINIC | Facility: CLINIC | Age: 69
End: 2024-05-03

## 2024-05-03 DIAGNOSIS — I77.71 DISSECTION OF CAROTID ARTERY (HCC): Primary | ICD-10-CM

## 2024-05-22 ENCOUNTER — HOSPITAL ENCOUNTER (OUTPATIENT)
Dept: RADIOLOGY | Facility: MEDICAL CENTER | Age: 69
Discharge: HOME | End: 2024-05-22
Payer: MEDICARE

## 2024-05-22 DIAGNOSIS — I77.71 DISSECTION OF CAROTID ARTERY (HCC): ICD-10-CM

## 2024-05-22 PROCEDURE — 93880 EXTRACRANIAL BILAT STUDY: CPT | Performed by: SURGERY

## 2024-05-22 PROCEDURE — 93880 EXTRACRANIAL BILAT STUDY: CPT

## 2024-05-26 DIAGNOSIS — E78.2 MIXED HYPERLIPIDEMIA: ICD-10-CM

## 2024-05-28 RX ORDER — FENOFIBRATE 48 MG/1
48 TABLET, COATED ORAL DAILY
Qty: 90 TABLET | Refills: 0 | Status: SHIPPED | OUTPATIENT
Start: 2024-05-28

## 2024-05-28 NOTE — TELEPHONE ENCOUNTER
Patient needs updated blood work and has previously placed orders 5.02.24. Please contact patient to go for labs. 90D Courtesy refill provided to give patient enough time to have labs completed

## 2024-05-29 ENCOUNTER — APPOINTMENT (OUTPATIENT)
Dept: LAB | Facility: CLINIC | Age: 69
End: 2024-05-29
Payer: MEDICARE

## 2024-05-29 DIAGNOSIS — I10 HYPERTENSION, ESSENTIAL: Chronic | ICD-10-CM

## 2024-05-29 LAB
ALBUMIN SERPL BCP-MCNC: 4.2 G/DL (ref 3.5–5)
ALP SERPL-CCNC: 62 U/L (ref 34–104)
ALT SERPL W P-5'-P-CCNC: 15 U/L (ref 7–52)
ANION GAP SERPL CALCULATED.3IONS-SCNC: 10 MMOL/L (ref 4–13)
AST SERPL W P-5'-P-CCNC: 29 U/L (ref 13–39)
BASOPHILS # BLD AUTO: 0.06 THOUSANDS/ÂΜL (ref 0–0.1)
BASOPHILS NFR BLD AUTO: 1 % (ref 0–1)
BILIRUB SERPL-MCNC: 0.73 MG/DL (ref 0.2–1)
BUN SERPL-MCNC: 14 MG/DL (ref 5–25)
CALCIUM SERPL-MCNC: 9.4 MG/DL (ref 8.4–10.2)
CHLORIDE SERPL-SCNC: 109 MMOL/L (ref 96–108)
CHOLEST SERPL-MCNC: 122 MG/DL
CO2 SERPL-SCNC: 24 MMOL/L (ref 21–32)
CREAT SERPL-MCNC: 1 MG/DL (ref 0.6–1.3)
EOSINOPHIL # BLD AUTO: 0.23 THOUSAND/ÂΜL (ref 0–0.61)
EOSINOPHIL NFR BLD AUTO: 3 % (ref 0–6)
ERYTHROCYTE [DISTWIDTH] IN BLOOD BY AUTOMATED COUNT: 12.8 % (ref 11.6–15.1)
GFR SERPL CREATININE-BSD FRML MDRD: 75 ML/MIN/1.73SQ M
GLUCOSE P FAST SERPL-MCNC: 96 MG/DL (ref 65–99)
HCT VFR BLD AUTO: 46.9 % (ref 36.5–49.3)
HDLC SERPL-MCNC: 39 MG/DL
HGB BLD-MCNC: 15.1 G/DL (ref 12–17)
IMM GRANULOCYTES # BLD AUTO: 0.02 THOUSAND/UL (ref 0–0.2)
IMM GRANULOCYTES NFR BLD AUTO: 0 % (ref 0–2)
LDLC SERPL CALC-MCNC: 70 MG/DL (ref 0–100)
LYMPHOCYTES # BLD AUTO: 1.1 THOUSANDS/ÂΜL (ref 0.6–4.47)
LYMPHOCYTES NFR BLD AUTO: 16 % (ref 14–44)
MCH RBC QN AUTO: 30.1 PG (ref 26.8–34.3)
MCHC RBC AUTO-ENTMCNC: 32.2 G/DL (ref 31.4–37.4)
MCV RBC AUTO: 93 FL (ref 82–98)
MONOCYTES # BLD AUTO: 0.61 THOUSAND/ÂΜL (ref 0.17–1.22)
MONOCYTES NFR BLD AUTO: 9 % (ref 4–12)
NEUTROPHILS # BLD AUTO: 4.86 THOUSANDS/ÂΜL (ref 1.85–7.62)
NEUTS SEG NFR BLD AUTO: 71 % (ref 43–75)
NONHDLC SERPL-MCNC: 83 MG/DL
NRBC BLD AUTO-RTO: 0 /100 WBCS
PLATELET # BLD AUTO: 241 THOUSANDS/UL (ref 149–390)
PMV BLD AUTO: 10.4 FL (ref 8.9–12.7)
POTASSIUM SERPL-SCNC: 4.4 MMOL/L (ref 3.5–5.3)
PROT SERPL-MCNC: 6.6 G/DL (ref 6.4–8.4)
RBC # BLD AUTO: 5.02 MILLION/UL (ref 3.88–5.62)
SODIUM SERPL-SCNC: 143 MMOL/L (ref 135–147)
TRIGL SERPL-MCNC: 66 MG/DL
WBC # BLD AUTO: 6.88 THOUSAND/UL (ref 4.31–10.16)

## 2024-05-29 PROCEDURE — 80061 LIPID PANEL: CPT

## 2024-05-29 PROCEDURE — 36415 COLL VENOUS BLD VENIPUNCTURE: CPT

## 2024-05-29 PROCEDURE — 85025 COMPLETE CBC W/AUTO DIFF WBC: CPT

## 2024-05-29 PROCEDURE — 80053 COMPREHEN METABOLIC PANEL: CPT

## 2024-07-06 DIAGNOSIS — I63.9 ISCHEMIC STROKE (HCC): ICD-10-CM

## 2024-07-06 RX ORDER — LISINOPRIL 5 MG/1
5 TABLET ORAL DAILY
Qty: 90 TABLET | Refills: 0 | Status: CANCELLED | OUTPATIENT
Start: 2024-07-06

## 2024-07-06 RX ORDER — LISINOPRIL 5 MG/1
5 TABLET ORAL DAILY
Qty: 90 TABLET | Refills: 0 | Status: SHIPPED | OUTPATIENT
Start: 2024-07-06

## 2024-08-07 DIAGNOSIS — I63.9 ISCHEMIC STROKE (HCC): ICD-10-CM

## 2024-08-07 RX ORDER — ATORVASTATIN CALCIUM 40 MG/1
40 TABLET, FILM COATED ORAL EVERY EVENING
Qty: 100 TABLET | Refills: 1 | Status: SHIPPED | OUTPATIENT
Start: 2024-08-07

## 2024-08-13 DIAGNOSIS — E78.2 MIXED HYPERLIPIDEMIA: ICD-10-CM

## 2024-08-13 RX ORDER — FENOFIBRATE 48 MG/1
48 TABLET, COATED ORAL DAILY
Qty: 90 TABLET | Refills: 1 | Status: SHIPPED | OUTPATIENT
Start: 2024-08-13 | End: 2024-08-24 | Stop reason: SDUPTHER

## 2024-08-24 DIAGNOSIS — E78.2 MIXED HYPERLIPIDEMIA: ICD-10-CM

## 2024-08-25 RX ORDER — FENOFIBRATE 48 MG/1
48 TABLET, COATED ORAL DAILY
Qty: 90 TABLET | Refills: 1 | Status: SHIPPED | OUTPATIENT
Start: 2024-08-25

## 2024-09-03 ENCOUNTER — TELEPHONE (OUTPATIENT)
Dept: NEUROLOGY | Facility: CLINIC | Age: 69
End: 2024-09-03

## 2024-09-11 ENCOUNTER — OFFICE VISIT (OUTPATIENT)
Dept: NEUROLOGY | Facility: CLINIC | Age: 69
End: 2024-09-11
Payer: MEDICARE

## 2024-09-11 VITALS
SYSTOLIC BLOOD PRESSURE: 118 MMHG | DIASTOLIC BLOOD PRESSURE: 70 MMHG | OXYGEN SATURATION: 98 % | TEMPERATURE: 97.8 F | BODY MASS INDEX: 27.44 KG/M2 | HEART RATE: 75 BPM | WEIGHT: 200.9 LBS

## 2024-09-11 DIAGNOSIS — I65.21 OCCLUSION OF RIGHT CAROTID ARTERY: ICD-10-CM

## 2024-09-11 DIAGNOSIS — I10 HYPERTENSION, ESSENTIAL: Chronic | ICD-10-CM

## 2024-09-11 DIAGNOSIS — Z86.73 HISTORY OF STROKE: ICD-10-CM

## 2024-09-11 DIAGNOSIS — E78.2 MIXED HYPERLIPIDEMIA: Primary | Chronic | ICD-10-CM

## 2024-09-11 PROCEDURE — 99214 OFFICE O/P EST MOD 30 MIN: CPT

## 2024-09-11 NOTE — PATIENT INSTRUCTIONS
- VAS carotid artery ultrasound monitoring ordered again.  To be repeated in May 2025.  Will certainly review results before the patient's next appointment and see if there is any further intervention needed.  However, for the time being patient can just continue on yearly carotid artery ultrasound monitoring.    - For ongoing stroke prevention continue: Aspirin 81 mg once daily, atorvastatin 40 mg once daily  - Discussed the importance of antiplatelet/anticoagulation management with the patient to prevent future strokes.   - Recommend to check blood pressure occasionally away from the doctor's office to make sure that those numbers are typically less than 130/80.  If they are frequently higher than that, we recommend checking a little more often and to follow up with primary care team   - Will defer to primary care team for monitoring of cholesterol panel and blood sugar numbers with target LDL cholesterol of less than 70 and hemoglobin A1c less than 7%  - Recommend following a low salt, mediterranean diet   - Recommend routine physical exercise as tolerated     We will plan for him to return to the office in 1 year time to see on of the APPs or Dr. Maynard but would be happy to see him sooner if the need should arise.  If he has any symptoms concerning for TIA or stroke including sudden painless loss of vision or double vision, difficulty speaking or swallowing, vertigo/room spinning that does not quickly resolve, or weakness/numbness/loss of coordination affecting 1 side of the face or body he should proceed by ambulance to the nearest emergency room immediately.

## 2024-09-11 NOTE — ASSESSMENT & PLAN NOTE
- Continue atorvastatin 40 mg once daily  - Continue to monitor lipid panel with primary care provider

## 2024-09-11 NOTE — PROGRESS NOTES
Ambulatory Visit  Name: Nick Murillo Jr.      : 1955      MRN: [id number]  Encounter Provider: Josep Echeverria PA-C  Encounter Date: 2024   Encounter department: [facility] [place]    Assessment & Plan  History of stroke  69-year-old male with history of hypertension, hyperlipidemia, occlusion of the right carotid artery, and history of stroke presents today for an office visit follow-up in regards to previous history of stroke.  Patient reports no new strokelike symptoms and denies any residual deficits besides some hoarseness/raspiness of his voice.  He notes that over the past year the hoarseness/raspiness has become much better, although the patient does struggle with it to some degree.  He notes that none of his acquaintances or his wife really noticed this on a daily basis.  The patient noted today in the office that he was having some instances of trouble focusing or concentrating when he was driving.  Noted no significant memory deficits at all.  He stated that he was not forgetting certain clear instructions of where to go on the road or forgetting how to get from one location to another.  She has noted slight instances where sometimes he would be uncertain about how to make a turn or problems with losing concentration for a brief period of time and drifting over into the   Other meka for a few seconds.  The patient actually notes that after the recent passing of his son who had  about a month ago in a motor vehicle accident, he is much more attentive now and paying attention much more clearly.  Advised the patient that if his concerns with driving continue to progress we will certainly look into fitness to drive evaluation although I do not believe that the patient requires this at this time.     Patient is still taking aspirin 81 mg once daily for secondary stroke prevention and atorvastatin 40 mg once daily for secondary stroke prevention and control over his  cholesterol as well.  The patient notes that he is doing well with controlling his blood pressure and taking his lisinopril for blood pressure daily.  Noted that the patient continues to follow with his primary care for monitoring of his lipid panel.  It is noted that the patient had a carotid artery ultrasound in May 2024, which showed right internal carotid artery occlusion and less than 50% stenosis of the left internal carotid artery.  Advised patient that we will monitor this on a yearly basis with a repeat carotid artery ultrasound to ensure stability.  Advised the patient to continue with appropriate blood pressure, cholesterol, and glycemic control at this time.  He should continue with taking antiplatelet monotherapy of aspirin 81 mg daily and continue with atorvastatin 40 mg daily for secondary stroke prevention.  Advised and educated the patient on proper diet and exercise recommendations as well.  Advised the patient also to reach out for any assistance in regards to grief counseling or if he is experiencing significant depression after his son's passing.    - VAS carotid artery ultrasound monitoring ordered again.  To be repeated in May 2025.  Will certainly review results before the patient's next appointment and see if there is any further intervention needed.  However, for the time being patient can just continue on yearly carotid artery ultrasound monitoring.    - For ongoing stroke prevention continue: Aspirin 81 mg once daily, atorvastatin 40 mg once daily  - Discussed the importance of antiplatelet/anticoagulation management with the patient to prevent future strokes.   - Recommend to check blood pressure occasionally away from the doctor's office to make sure that those numbers are typically less than 130/80.  If they are frequently higher than that, we recommend checking a little more often and to follow up with primary care team   - Will defer to primary care team for monitoring of cholesterol  panel and blood sugar numbers with target LDL cholesterol of less than 70 and hemoglobin A1c less than 7%  - Recommend following a low salt, mediterranean diet   - Recommend routine physical exercise as tolerated     Orders:    VAS carotid complete study; Future    Mixed hyperlipidemia  - Continue atorvastatin 40 mg once daily  - Continue to monitor lipid panel with primary care provider       Hypertension, essential  - Advised continuation of blood pressure monitoring with his primary care provider, encouraged patient to continue with taking his lisinopril daily.       Occlusion of right carotid artery  - VAS carotid artery ultrasound ordered and to be completed in May 2025.  Known right internal carotid artery occlusion, left internal carotid artery less than 50% stenosis.  Orders:    VAS carotid complete study; Future      Patient Instructions   - VAS carotid artery ultrasound monitoring ordered again.  To be repeated in May 2025.  Will certainly review results before the patient's next appointment and see if there is any further intervention needed.  However, for the time being patient can just continue on yearly carotid artery ultrasound monitoring.    - For ongoing stroke prevention continue: Aspirin 81 mg once daily, atorvastatin 40 mg once daily  - Discussed the importance of antiplatelet/anticoagulation management with the patient to prevent future strokes.   - Recommend to check blood pressure occasionally away from the doctor's office to make sure that those numbers are typically less than 130/80.  If they are frequently higher than that, we recommend checking a little more often and to follow up with primary care team   - Will defer to primary care team for monitoring of cholesterol panel and blood sugar numbers with target LDL cholesterol of less than 70 and hemoglobin A1c less than 7%  - Recommend following a low salt, mediterranean diet   - Recommend routine physical exercise as tolerated     We will  plan for him to return to the office in 1 year time to see on of the APPs or Dr. Maynard but would be happy to see him sooner if the need should arise.  If he has any symptoms concerning for TIA or stroke including sudden painless loss of vision or double vision, difficulty speaking or swallowing, vertigo/room spinning that does not quickly resolve, or weakness/numbness/loss of coordination affecting 1 side of the face or body he should proceed by ambulance to the nearest emergency room immediately.    History of Present Illness   HPI    For Review:    The patient was last seen in the office by myself on 09/07/2023.  At the time of the last appointment, the patient was presenting for a follow-up appointment in regard to his history of stroke.  Was noted that he was doing well and not having any new concerning symptoms for stroke at the last visit.  No concerns in regards to residual deficits, he noticed that the left facial weakness that he initially had after the strokes seem to improve.  He does notice some change to his voice and hoarseness/raspiness that continues to occur.  Patient was doing well at the last visit with taking aspirin and atorvastatin on a consistent daily basis.  He was doing well and monitoring his blood pressure at home and noting that his blood pressure is usually less than 130/80.  His most recent LDL was 66 mg/dL most recent hemoglobin A1c 5.9%.  Is noted that the patient lost 50 pounds over the last few months and reduced his portion size when eating.  He was having no history of smoking and no other issues with sleep apnea noted.  The patient was also noted to have a known right carotid artery occlusion/dissection.  Advised that would certainly look into if we need any further repeat monitoring with another CTA of the head and neck to monitor occlusion/dissection.  Patient did have VAS carotid artery ultrasound monitoring in May 2024, showed stable right internal carotid artery  occlusion with less than 50% stenosis of the left internal carotid artery.      Interval History:    New stroke symptoms/residual symptoms:    Any new, sudden onset weakness, numbness, facial droop, slurred speech, difficulty speaking, trouble swallowing, persistent vertigo, or sudden double vision or vision loss? No new stroke-like symptoms     Residual symptoms include: other: hoarseness/voice changes    Stroke Etiology and Risk Factor modification:    This was a(n) embolic stroke, most likely related to Hypotensive/watershed    Stroke risk factors were evaluated including: hyperlipidemia, hypertension, prediabetes     AP/AC therapy: aspirin 81 mg once daily. No bleeding or bruising.     Statin therapy: atorvastatin 40 mg once daily     Blood pressure today and as of late:118/70 BP today in the office. Blood pressure monitor at home had not been working well so no longer takes it at home.     Most recent LDL: 70 mg/dL    Most recent hemoglobin A1C: 5.9%    Cardiology evaluation? Any cardiac monitoring required?: No cardiology follow up      Endocrinology evaluation? Following proper glycemic treatment/diet?: No endocrinology follow up     Vascular surgery evaluation? Monitoring of carotid artery ultrasound required? No vascular surgery follow up, had recent carotid artery ultrasound on 05/22/2024. Right carotid artery occlusion know, and less than 50% stenosis of the left carotid artery.     Lifestyle history/modifications:    Diet/Exercise regimen: Does note he could be eating much better, he has not regained appetite since recent son's passing. He does walk quite a bit throughout the day, about 12,000 steps a day.     Any physical therapy, occupational therapy or speech therapy performed/required at this time?: No PT/OT/ST    Any difficulty with sleeping? Any history of sleep apnea? CPAP compliance?: Always falling asleep early and getting up early in the morning he states. No snoring since losing weight he  states.     Post-stroke depression/anxiety? Recent passing of his son from a car accident a month ago. Noticing some acute grief and more depression. No feeling any suicidal ideations or has any suicidal plan he states.     Any history of smoking or tobacco usage?: No smoking history     Additional History:     Noticing more difficulty with focusing and concentration with driving, especially when at night time. More difficulty with concentration at night time. Feeling a bit unsure or uneasy when making turns. Sometimes losing concentration and wavering into a different meka for a few seconds at a time. Not feeling very tired or sleepy when he is driving. May feel more difficulty especially with longer intervals of driving he states. Not noticing that his mind is wandering or concentrating on other tasks when he is driving. No issues with forgetting where he is going when he generally knows the roads very well. Sometimes difficulty with figuring out which turning meka he is suppose to be in.     The patient noted that he still has concerns in regards to hoarseness/raspiness of his voice.  Patient notes that this is not completely recovered since his past stroke.  Patient notes that it has improved to some degree since the last visit a year ago.  Patient's wife notes that this is hardly noticeable to her.  Patient notes that his parents do not seem to notice the hoarseness/raspiness in his voice as well.    Review of Systems  I have personally reviewed the MA's review of systems and made changes as necessary.    Medical History Reviewed by provider this encounter:  Tobacco  Allergies  Meds  Problems  Med Hx  Surg Hx  Fam Hx       Past Medical History   Past Medical History:   Diagnosis Date    Elevated cholesterol with high triglycerides     Stroke (HCC)      Past Surgical History:   Procedure Laterality Date    HERNIA REPAIR Left     inguinal    [place] COLONOSCOPY FLX DX W/COLLJ SPEC WHEN PFRMD N/A 2/24/2017     Procedure: COLONOSCOPY;  Surgeon: Marni Hughes MD;  Location: [facility];  Service: Colorectal     Family History   Problem Relation Age of Onset    Diabetes Mother     Ulcerative colitis Son     Stroke Father         SYNDROME      Current Outpatient Medications on File Prior to Visit   Medication Sig Dispense Refill    amLODIPine (NORVASC) 5 mg tablet TAKE 1 TABLET BY MOUTH EVERY DAY 90 tablet 1    aspirin (ECOTRIN LOW STRENGTH) 81 mg EC tablet Take 1 tablet (81 mg total) by mouth in the morning.  0    atorvastatin (LIPITOR) 40 mg tablet TAKE 1 TABLET BY MOUTH EVERY EVENING 100 tablet 1    fenofibrate (TRICOR) 48 mg tablet Take 1 tablet (48 mg total) by mouth daily 90 tablet 1    lisinopril (ZESTRIL) 5 mg tablet TAKE 1 TABLET BY MOUTH EVERY DAY 90 tablet 0    [DISCONTINUED] clopidogrel (PLAVIX) 75 mg tablet Take 1 tablet (75 mg total) by mouth in the morning. 90 tablet 0     No current facility-administered medications on file prior to visit.     Allergies   Allergen Reactions    Penicillins Rash      Current Outpatient Medications on File Prior to Visit   Medication Sig Dispense Refill    amLODIPine (NORVASC) 5 mg tablet TAKE 1 TABLET BY MOUTH EVERY DAY 90 tablet 1    aspirin (ECOTRIN LOW STRENGTH) 81 mg EC tablet Take 1 tablet (81 mg total) by mouth in the morning.  0    atorvastatin (LIPITOR) 40 mg tablet TAKE 1 TABLET BY MOUTH EVERY EVENING 100 tablet 1    fenofibrate (TRICOR) 48 mg tablet Take 1 tablet (48 mg total) by mouth daily 90 tablet 1    lisinopril (ZESTRIL) 5 mg tablet TAKE 1 TABLET BY MOUTH EVERY DAY 90 tablet 0    [DISCONTINUED] clopidogrel (PLAVIX) 75 mg tablet Take 1 tablet (75 mg total) by mouth in the morning. 90 tablet 0     No current facility-administered medications on file prior to visit.      Social History     Tobacco Use    Smoking status: Never     Passive exposure: Past    Smokeless tobacco: Never    Tobacco comments:     No history.   Vaping Use    Vaping status: Never Used    Substance and Sexual Activity    Alcohol use: Not Currently     Alcohol/week: 1.0 standard drink of alcohol     Types: 1 Glasses of wine per week     Comment: Rarely    Drug use: Never    Sexual activity: Yes     Partners: Female     Birth control/protection: Condom Male     Objective     /70 (BP Location: Left arm, Patient Position: Sitting, Cuff Size: Adult)   Pulse 75   Temp 97.8 °F (36.6 °C) (Temporal)   Wt 91.1 kg (200 lb 14.4 oz)   SpO2 98%   BMI 27.44 kg/m²     Physical Exam  Vitals and nursing note reviewed.   Eyes:      Extraocular Movements: EOM normal.      Pupils: Pupils are equal, round, and reactive to light.   Neurological:      Mental Status: He is oriented to person, place, and time.      Motor: Motor strength is normal.     Gait: Gait is intact.      Deep Tendon Reflexes:      Reflex Scores:       Bicep reflexes are 2+ on the right side and 2+ on the left side.       Brachioradialis reflexes are 2+ on the right side and 2+ on the left side.       Patellar reflexes are 2+ on the right side and 2+ on the left side.  Psychiatric:         Mood and Affect: Mood normal.         Speech: Speech normal.         Behavior: Behavior normal.         Neurologic Exam     Mental Status   Oriented to person, place, and time.   Attention: normal. Concentration: normal.   Speech: speech is normal   Level of consciousness: alert  Knowledge: good.     Cranial Nerves     CN II   Visual fields full to confrontation.   Visual acuity: normal    CN III, IV, VI   Pupils are equal, round, and reactive to light.  Extraocular motions are normal.     CN V   Facial sensation intact.     CN VII   Facial expression full, symmetric.     CN VIII   CN VIII normal.     CN IX, X   CN IX normal.   CN X normal.     CN XI   CN XI normal.     CN XII   CN XII normal.     Motor Exam   Muscle bulk: normal  Overall muscle tone: normal    Strength   Strength 5/5 throughout.     Sensory Exam   Light touch normal.     Gait,  Coordination, and Reflexes     Gait  Gait: normal    Tremor   Resting tremor: absent  Intention tremor: absent  Action tremor: absent    Reflexes   Right brachioradialis: 2+  Left brachioradialis: 2+  Right biceps: 2+  Left biceps: 2+  Right patellar: 2+  Left patellar: 2+      Administrative Statements   I have spent a total time of 30 minutes in caring for this patient on the day of the visit/encounter including Diagnostic results, Risks and benefits of tx options, Instructions for management, Patient and family education, Importance of tx compliance, Risk factor reductions, Impressions, Counseling / Coordination of care, Documenting in the medical record, Reviewing / ordering tests, medicine, procedures  , and Obtaining or reviewing history  .      Josep Echeverria PA-C  09/11/2024

## 2024-09-11 NOTE — ASSESSMENT & PLAN NOTE
- Advised continuation of blood pressure monitoring with his primary care provider, encouraged patient to continue with taking his lisinopril daily.

## 2024-09-11 NOTE — ASSESSMENT & PLAN NOTE
69-year-old male with history of hypertension, hyperlipidemia, occlusion of the right carotid artery, and history of stroke presents today for an office visit follow-up in regards to previous history of stroke.  Patient reports no new strokelike symptoms and denies any residual deficits besides some hoarseness/raspiness of his voice.  He notes that over the past year the hoarseness/raspiness has become much better, although the patient does struggle with it to some degree.  He notes that none of his acquaintances or his wife really noticed this on a daily basis.  The patient noted today in the office that he was having some instances of trouble focusing or concentrating when he was driving.  Noted no significant memory deficits at all.  He stated that he was not forgetting certain clear instructions of where to go on the road or forgetting how to get from one location to another.  She has noted slight instances where sometimes he would be uncertain about how to make a turn or problems with losing concentration for a brief period of time and drifting over into the   Other meka for a few seconds.  The patient actually notes that after the recent passing of his son who had  about a month ago in a motor vehicle accident, he is much more attentive now and paying attention much more clearly.  Advised the patient that if his concerns with driving continue to progress we will certainly look into fitness to drive evaluation although I do not believe that the patient requires this at this time.     Patient is still taking aspirin 81 mg once daily for secondary stroke prevention and atorvastatin 40 mg once daily for secondary stroke prevention and control over his cholesterol as well.  The patient notes that he is doing well with controlling his blood pressure and taking his lisinopril for blood pressure daily.  Noted that the patient continues to follow with his primary care for monitoring of his lipid panel.  It is noted  that the patient had a carotid artery ultrasound in May 2024, which showed right internal carotid artery occlusion and less than 50% stenosis of the left internal carotid artery.  Advised patient that we will monitor this on a yearly basis with a repeat carotid artery ultrasound to ensure stability.  Advised the patient to continue with appropriate blood pressure, cholesterol, and glycemic control at this time.  He should continue with taking antiplatelet monotherapy of aspirin 81 mg daily and continue with atorvastatin 40 mg daily for secondary stroke prevention.  Advised and educated the patient on proper diet and exercise recommendations as well.  Advised the patient also to reach out for any assistance in regards to grief counseling or if he is experiencing significant depression after his son's passing.    - VAS carotid artery ultrasound monitoring ordered again.  To be repeated in May 2025.  Will certainly review results before the patient's next appointment and see if there is any further intervention needed.  However, for the time being patient can just continue on yearly carotid artery ultrasound monitoring.    - For ongoing stroke prevention continue: Aspirin 81 mg once daily, atorvastatin 40 mg once daily  - Discussed the importance of antiplatelet/anticoagulation management with the patient to prevent future strokes.   - Recommend to check blood pressure occasionally away from the doctor's office to make sure that those numbers are typically less than 130/80.  If they are frequently higher than that, we recommend checking a little more often and to follow up with primary care team   - Will defer to primary care team for monitoring of cholesterol panel and blood sugar numbers with target LDL cholesterol of less than 70 and hemoglobin A1c less than 7%  - Recommend following a low salt, mediterranean diet   - Recommend routine physical exercise as tolerated     Orders:    VAS carotid complete study;  Future

## 2024-09-11 NOTE — ASSESSMENT & PLAN NOTE
- VAS carotid artery ultrasound ordered and to be completed in May 2025.  Known right internal carotid artery occlusion, left internal carotid artery less than 50% stenosis.  Orders:    VAS carotid complete study; Future

## 2024-09-20 DIAGNOSIS — I63.9 ISCHEMIC STROKE (HCC): ICD-10-CM

## 2024-09-20 DIAGNOSIS — I10 ESSENTIAL HYPERTENSION: ICD-10-CM

## 2024-09-20 RX ORDER — AMLODIPINE BESYLATE 5 MG/1
5 TABLET ORAL DAILY
Qty: 90 TABLET | Refills: 1 | Status: SHIPPED | OUTPATIENT
Start: 2024-09-20

## 2024-09-20 RX ORDER — LISINOPRIL 5 MG/1
5 TABLET ORAL DAILY
Qty: 90 TABLET | Refills: 1 | Status: SHIPPED | OUTPATIENT
Start: 2024-09-20

## 2024-10-02 ENCOUNTER — TELEPHONE (OUTPATIENT)
Age: 69
End: 2024-10-02

## 2024-10-02 NOTE — TELEPHONE ENCOUNTER
Patient called requesting refill for   amLODIPine1 (NORVASC) 5 mg tablet  . Patient made aware medication was refilled on 09/20 for 90 with 1 refills to [facility] pharmacy. Patient instructed to contact the pharmacy to obtain refills of medication. Patient verbalized understanding.

## 2024-10-22 ENCOUNTER — OFFICE VISIT (OUTPATIENT)
Dept: FAMILY MEDICINE CLINIC | Facility: CLINIC | Age: 69
End: 2024-10-22
Payer: MEDICARE

## 2024-10-22 VITALS
OXYGEN SATURATION: 99 % | DIASTOLIC BLOOD PRESSURE: 76 MMHG | HEIGHT: 72 IN | WEIGHT: 201 LBS | SYSTOLIC BLOOD PRESSURE: 110 MMHG | TEMPERATURE: 97.8 F | BODY MASS INDEX: 27.22 KG/M2 | HEART RATE: 69 BPM

## 2024-10-22 DIAGNOSIS — M62.838 MUSCLE SPASM: Primary | ICD-10-CM

## 2024-10-22 PROCEDURE — 99214 OFFICE O/P EST MOD 30 MIN: CPT | Performed by: NURSE PRACTITIONER

## 2024-10-22 PROCEDURE — G2211 COMPLEX E/M VISIT ADD ON: HCPCS | Performed by: NURSE PRACTITIONER

## 2024-10-22 RX ORDER — CYCLOBENZAPRINE HCL 10 MG
10 TABLET ORAL
Qty: 7 TABLET | Refills: 0 | Status: SHIPPED | OUTPATIENT
Start: 2024-10-22 | End: 2024-10-29

## 2024-10-22 NOTE — PROGRESS NOTES
"Ambulatory Visit  Name: Nick Murillo Jr.      : 1955      MRN: [id number]  Encounter Provider: CARLOS ALBERTO Carter  Encounter Date: 10/22/2024   Encounter department: [facility]    Assessment & Plan  Muscle spasm    Orders:    cyclobenzaprine (FLEXERIL) 10 mg tablet; Take 1 tablet (10 mg total) by mouth daily at bedtime for 7 days       History of Present Illness     69 y.o.male presenting with possible pulled muscle in right groin and possible muscle pull on back near right shoulder for the past 2 weeks. He does not recall injuring himself.        History obtained from : patient and patient's Significant Other    Review of Systems   Constitutional: Negative.    HENT: Negative.     Respiratory: Negative.     Cardiovascular: Negative.    Gastrointestinal:  Negative for abdominal distention, abdominal pain, constipation and diarrhea.   Musculoskeletal:  Positive for myalgias.   Neurological: Negative.    Psychiatric/Behavioral: Negative.       Current Outpatient Medications on File Prior to Visit   Medication Sig Dispense Refill    amLODIPine (NORVASC) 5 mg tablet TAKE 1 TABLET BY MOUTH EVERY DAY 90 tablet 1    aspirin (ECOTRIN LOW STRENGTH) 81 mg EC tablet Take 1 tablet (81 mg total) by mouth in the morning.  0    atorvastatin (LIPITOR) 40 mg tablet TAKE 1 TABLET BY MOUTH EVERY EVENING 100 tablet 1    fenofibrate (TRICOR) 48 mg tablet Take 1 tablet (48 mg total) by mouth daily 90 tablet 1    lisinopril (ZESTRIL) 5 mg tablet TAKE 1 TABLET BY MOUTH EVERY DAY 90 tablet 1    [DISCONTINUED] clopidogrel (PLAVIX) 75 mg tablet Take 1 tablet (75 mg total) by mouth in the morning. 90 tablet 0     No current facility-administered medications on file prior to visit.        Objective     /76 (BP Location: Right arm, Patient Position: Sitting, Cuff Size: Large)   Pulse 69   Temp 97.8 °F (36.6 °C)   Ht 5' 11.75\" (1.822 m)   Wt 91.2 kg (201 lb)   SpO2 99%   BMI 27.45 kg/m² " (Reviewed)    Physical Exam  Vitals reviewed.   Constitutional:       General: He is not in acute distress.     Appearance: He is not ill-appearing.   HENT:      Head: Normocephalic and atraumatic.      Right Ear: External ear normal.      Left Ear: External ear normal.   Eyes:      Extraocular Movements: Extraocular movements intact.      Conjunctiva/sclera: Conjunctivae normal.      Pupils: Pupils are equal, round, and reactive to light.   Cardiovascular:      Rate and Rhythm: Normal rate and regular rhythm.      Heart sounds: Normal heart sounds.   Pulmonary:      Effort: Pulmonary effort is normal.      Breath sounds: Normal breath sounds.   Abdominal:      General: Abdomen is flat. Bowel sounds are normal. There is no distension.      Palpations: Abdomen is soft.      Tenderness: There is no abdominal tenderness.   Musculoskeletal:      Right shoulder: Tenderness present. No swelling, deformity or bony tenderness. Normal range of motion. Normal strength. Normal pulse.        Arms:       Cervical back: Neck supple.        Legs:    Skin:     General: Skin is warm and dry.      Capillary Refill: Capillary refill takes less than 2 seconds.   Neurological:      Mental Status: He is alert and oriented to person, place, and time.   Psychiatric:         Mood and Affect: Mood normal.         Behavior: Behavior normal.

## 2024-11-01 ENCOUNTER — OFFICE VISIT (OUTPATIENT)
Dept: FAMILY MEDICINE CLINIC | Facility: CLINIC | Age: 69
End: 2024-11-01
Payer: MEDICARE

## 2024-11-01 VITALS
WEIGHT: 200.2 LBS | HEART RATE: 81 BPM | HEIGHT: 71 IN | TEMPERATURE: 97.3 F | BODY MASS INDEX: 28.03 KG/M2 | SYSTOLIC BLOOD PRESSURE: 120 MMHG | OXYGEN SATURATION: 97 % | DIASTOLIC BLOOD PRESSURE: 80 MMHG | RESPIRATION RATE: 18 BRPM

## 2024-11-01 DIAGNOSIS — M62.838 MUSCLE SPASM: ICD-10-CM

## 2024-11-01 DIAGNOSIS — E78.2 MIXED HYPERLIPIDEMIA: Chronic | ICD-10-CM

## 2024-11-01 DIAGNOSIS — Z12.5 SCREENING FOR PROSTATE CANCER: ICD-10-CM

## 2024-11-01 DIAGNOSIS — I10 ESSENTIAL HYPERTENSION: Primary | ICD-10-CM

## 2024-11-01 DIAGNOSIS — H61.22 HEARING LOSS OF LEFT EAR DUE TO CERUMEN IMPACTION: ICD-10-CM

## 2024-11-01 PROCEDURE — 69210 REMOVE IMPACTED EAR WAX UNI: CPT | Performed by: FAMILY MEDICINE

## 2024-11-01 PROCEDURE — 99214 OFFICE O/P EST MOD 30 MIN: CPT | Performed by: FAMILY MEDICINE

## 2024-11-01 RX ORDER — CYCLOBENZAPRINE HCL 10 MG
10 TABLET ORAL
Qty: 7 TABLET | Refills: 0 | Status: SHIPPED | OUTPATIENT
Start: 2024-11-01 | End: 2024-11-08 | Stop reason: SDUPTHER

## 2024-11-01 NOTE — ASSESSMENT & PLAN NOTE
Well controlled. Cont present treatment. Monitor labs. Recheck 6m    Orders:    CBC and differential; Future    Comprehensive metabolic panel; Future    Lipid panel; Future

## 2024-11-01 NOTE — PROGRESS NOTES
Ambulatory Visit  Name: Nick Murillo Jr.      : 1955      MRN: [id number]  Encounter Provider: Lai Capone MD  Encounter Date: 2024   Encounter department: [facility]    Assessment & Plan  Essential hypertension  Well controlled. Cont present treatment. Monitor labs. Recheck 6m    Orders:    CBC and differential; Future    Comprehensive metabolic panel; Future    Lipid panel; Future    Mixed hyperlipidemia  Check labs. Continue atorvastatin.  Adjust dose if not at goal. Recheck 6m       Muscle spasm  S/p fall.  Pt denies bone pain, and exam suggests muscular injuries. Pt has taken Flexeril 10mg without side effects recently - will refill.  Discussed side effects and warnings. Recheck 2w if not improving - earlier if worse  Orders:    cyclobenzaprine (FLEXERIL) 10 mg tablet; Take 1 tablet (10 mg total) by mouth daily at bedtime for 7 days    Hearing loss of left ear due to cerumen impaction  Hearing sl improved s/p cerumen removal.  Watch. Consider audiology eval if hearing issues persist  Orders:    Ear cerumen removal    Screening for prostate cancer    Orders:    PSA, Total Screen; Future         History of Present Illness     f/u multiple med issues  - pt struggling with grief since the loss of his son this past August.  Feels that he is grieving well and denies worsening depression. Remains active   - pt denies any new neurologic issues. BP have been stable. Denies CP, palpitations, lightheadedness or other CV symptoms with or without exertion  - pt notes that his appetite has been decreased since the loss of his son, and has lost 5-7 lbs. Pt working on maintaining his weight. Otherwise he denies any new GI or  complaints  - pt fell at a local [employer] football game last Friday. No injury, head trauma, or other issues. Did not get checked. Pt has some muscle aches now and would like to have his Flexeril refilled.   - pt notes some L ear fullness and decreased hearing.   He would like to have this checked      Review of Systems   Constitutional:  Positive for appetite change and unexpected weight change. Negative for activity change, diaphoresis, fatigue and fever.   HENT:  Positive for hearing loss (mild, left). Negative for congestion, ear discharge, ear pain, sinus pressure and sinus pain.    Eyes: Negative.    Respiratory: Negative.     Cardiovascular: Negative.    Gastrointestinal: Negative.    Genitourinary: Negative.    Musculoskeletal:  Positive for arthralgias and myalgias. Negative for gait problem, joint swelling, neck pain and neck stiffness.   Skin: Negative.    Neurological:  Negative for dizziness, weakness, light-headedness, numbness and headaches.   Psychiatric/Behavioral:  Positive for dysphoric mood (intermittent due to grief). Negative for agitation, confusion, decreased concentration and sleep disturbance. The patient is not nervous/anxious.      Past Medical History:   Diagnosis Date    Elevated cholesterol with high triglycerides     Stroke (HCC)      Past Surgical History:   Procedure Laterality Date    HERNIA REPAIR Left     inguinal    CA COLONOSCOPY FLX DX W/COLLJ SPEC WHEN PFRMD N/A 2/24/2017    Procedure: COLONOSCOPY;  Surgeon: Marni Hughes MD;  Location: [facility];  Service: Colorectal     Family History   Problem Relation Age of Onset    Diabetes Mother     Ulcerative colitis Son     Stroke Father         SYNDROME      Social History     Tobacco Use    Smoking status: Never     Passive exposure: Past    Smokeless tobacco: Never    Tobacco comments:     No history.   Vaping Use    Vaping status: Never Used   Substance and Sexual Activity    Alcohol use: Not Currently     Alcohol/week: 1.0 standard drink of alcohol     Types: 1 Glasses of wine per week     Comment: Rarely    Drug use: Never    Sexual activity: Yes     Partners: Female     Birth control/protection: Condom Male     Current Outpatient Medications on File Prior to Visit   Medication  "Sig    amLODIPine (NORVASC) 5 mg tablet TAKE 1 TABLET BY MOUTH EVERY DAY    aspirin (ECOTRIN LOW STRENGTH) 81 mg EC tablet Take 1 tablet (81 mg total) by mouth in the morning.    atorvastatin (LIPITOR) 40 mg tablet TAKE 1 TABLET BY MOUTH EVERY EVENING    fenofibrate (TRICOR) 48 mg tablet Take 1 tablet (48 mg total) by mouth daily    lisinopril (ZESTRIL) 5 mg tablet TAKE 1 TABLET BY MOUTH EVERY DAY    [DISCONTINUED] clopidogrel (PLAVIX) 75 mg tablet Take 1 tablet (75 mg total) by mouth in the morning.     Allergies   Allergen Reactions    Penicillins Rash     Immunization History   Administered Date(s) Administered    COVID-19 MODERNA VACC 0.5 ML IM 01/19/2021, 02/16/2021, 10/28/2021, 04/11/2022    COVID-19 Moderna Vac BIVALENT 12 Yr+ IM 0.5 ML 10/02/2022    COVID-19 Moderna mRNA Vaccine 12 Yr+ 50 mcg/0.5 mL (Spikevax) 10/07/2023    INFLUENZA 09/23/2021, 11/01/2022, 10/22/2023    Influenza Quadrivalent, 6-35 Months IM 11/07/2017    Influenza, high dose seasonal 0.7 mL 09/26/2018, 09/19/2019    Pneumococcal Conjugate 13-Valent 09/26/2018    Tdap 09/25/2020     Objective     /80 (BP Location: Left arm, Patient Position: Sitting, Cuff Size: Standard)   Pulse 81   Temp (!) 97.3 °F (36.3 °C) (Temporal)   Resp 18   Ht 5' 11\" (1.803 m)   Wt 90.8 kg (200 lb 3.2 oz)   SpO2 97%   BMI 27.92 kg/m²     Physical Exam  Vitals reviewed.   Constitutional:       Appearance: Normal appearance. He is normal weight.   HENT:      Head: Normocephalic.      Right Ear: Tympanic membrane, ear canal and external ear normal.      Left Ear: External ear normal. There is impacted cerumen.      Nose: Nose normal.      Mouth/Throat:      Mouth: Mucous membranes are moist.   Eyes:      Extraocular Movements: Extraocular movements intact.      Conjunctiva/sclera: Conjunctivae normal.      Pupils: Pupils are equal, round, and reactive to light.   Cardiovascular:      Rate and Rhythm: Normal rate and regular rhythm.      Pulses: Normal " pulses.   Pulmonary:      Effort: Pulmonary effort is normal.      Breath sounds: Normal breath sounds.   Abdominal:      General: There is no distension.      Palpations: There is no mass.      Tenderness: There is no abdominal tenderness.   Musculoskeletal:         General: Tenderness (mild upper back and shoulders.  Good ROM of joints) present. No swelling or deformity.      Cervical back: No tenderness.      Right lower leg: No edema.      Left lower leg: No edema.   Lymphadenopathy:      Cervical: No cervical adenopathy.   Skin:     General: Skin is warm.      Capillary Refill: Capillary refill takes less than 2 seconds.   Neurological:      General: No focal deficit present.      Mental Status: He is alert and oriented to person, place, and time.   Psychiatric:         Mood and Affect: Mood normal.      Comments: PHQ-2/9 Depression Screening    Little interest or pleasure in doing things: 0 - not at all  Feeling down, depressed, or hopeless: 0 - not at all  PHQ-2 Score: 0  PHQ-2 Interpretation: Negative depression screen         Ear cerumen removal    Date/Time: 11/1/2024 9:30 AM    Performed by: Lai Capone MD  Authorized by: Lai Capone MD  Universal Protocol:  procedure performed by consultantConsent: Verbal consent obtained.  Risks and benefits: risks, benefits and alternatives were discussed  Consent given by: patient  Patient understanding: patient states understanding of the procedure being performed  Patient identity confirmed: verbally with patient    Patient location:  Clinic  Procedure details:     Local anesthetic:  None    Location:  L ear    Procedure type: irrigation with instrumentation      Instrumentation: loop      Approach:  Natural orifice    Visualization (free text):  Otoscope    Equipment used:  Irrigation, loop  Post-procedure details:     Complication:  None    Hearing quality:  Improved    Patient tolerance of procedure:  Tolerated well, no immediate  complications

## 2024-11-08 DIAGNOSIS — M62.838 MUSCLE SPASM: ICD-10-CM

## 2024-11-11 RX ORDER — CYCLOBENZAPRINE HCL 10 MG
10 TABLET ORAL
Qty: 7 TABLET | Refills: 0 | Status: SHIPPED | OUTPATIENT
Start: 2024-11-11 | End: 2024-11-18

## 2025-01-03 DIAGNOSIS — I10 ESSENTIAL HYPERTENSION: ICD-10-CM

## 2025-01-03 DIAGNOSIS — I63.9 ISCHEMIC STROKE (HCC): ICD-10-CM

## 2025-01-03 RX ORDER — LISINOPRIL 5 MG/1
5 TABLET ORAL DAILY
Qty: 90 TABLET | Refills: 0 | Status: SHIPPED | OUTPATIENT
Start: 2025-01-03

## 2025-01-03 RX ORDER — AMLODIPINE BESYLATE 5 MG/1
5 TABLET ORAL DAILY
Qty: 90 TABLET | Refills: 0 | Status: SHIPPED | OUTPATIENT
Start: 2025-01-03

## 2025-01-08 ENCOUNTER — TELEPHONE (OUTPATIENT)
Age: 70
End: 2025-01-08

## 2025-01-08 NOTE — TELEPHONE ENCOUNTER
Sally from [facility]rysten calling asking if the Immune Deficiency screening fax was received for PCP to review and send back . Aware there is no fax as of yet scanned in the chart. Please follow up when fax is received , reviewed and a response

## 2025-01-10 NOTE — TELEPHONE ENCOUNTER
Madison called in to see if the fax was received. I spoke to Jo Ann and at the time of the call nothing was received. If possible Sally is requesting a call once the fax is received.     Thank you

## 2025-01-14 NOTE — TELEPHONE ENCOUNTER
"Form looked suspicious. Called Nick, it is SPAM. He did NOT request this. Faxed it back with comment \"do not send again\"  "

## 2025-01-14 NOTE — TELEPHONE ENCOUNTER
Sally from [employer] called again to follow-up on the Immune Deficiency Screening form she has been faxing to the office.  Informed her there is no form scanned to pt's chart yet.  We verified that she had the correct fax number and she will re-fax the form now.  Please watch for form.

## 2025-01-16 NOTE — TELEPHONE ENCOUNTER
Madison called back to check on the fax.  Relayed message do not send again  No further action required.

## 2025-02-05 DIAGNOSIS — I63.9 ISCHEMIC STROKE (HCC): ICD-10-CM

## 2025-02-05 RX ORDER — ATORVASTATIN CALCIUM 40 MG/1
40 TABLET, FILM COATED ORAL EVERY EVENING
Qty: 100 TABLET | Refills: 1 | Status: SHIPPED | OUTPATIENT
Start: 2025-02-05

## 2025-02-12 DIAGNOSIS — E78.2 MIXED HYPERLIPIDEMIA: ICD-10-CM

## 2025-02-12 RX ORDER — FENOFIBRATE 48 MG/1
48 TABLET, COATED ORAL DAILY
Qty: 90 TABLET | Refills: 1 | Status: SHIPPED | OUTPATIENT
Start: 2025-02-12

## 2025-03-23 DIAGNOSIS — I63.9 ISCHEMIC STROKE (HCC): ICD-10-CM

## 2025-03-23 DIAGNOSIS — I10 ESSENTIAL HYPERTENSION: ICD-10-CM

## 2025-03-23 RX ORDER — LISINOPRIL 5 MG/1
5 TABLET ORAL DAILY
Qty: 90 TABLET | Refills: 1 | Status: SHIPPED | OUTPATIENT
Start: 2025-03-23

## 2025-03-23 RX ORDER — AMLODIPINE BESYLATE 5 MG/1
5 TABLET ORAL DAILY
Qty: 90 TABLET | Refills: 1 | Status: SHIPPED | OUTPATIENT
Start: 2025-03-23

## 2025-05-01 ENCOUNTER — PATIENT MESSAGE (OUTPATIENT)
Dept: FAMILY MEDICINE CLINIC | Facility: CLINIC | Age: 70
End: 2025-05-01

## 2025-05-08 ENCOUNTER — TELEPHONE (OUTPATIENT)
Dept: OTHER | Facility: OTHER | Age: 70
End: 2025-05-08

## 2025-05-08 NOTE — TELEPHONE ENCOUNTER
"Pt stated, \"I just missed a call indicating I should schedule some kind of test with my carotid artery with Dr. Echeverria at the [facility]. Please have the office call me to go over what test needs to be done and why. Currently, I am having no symptoms.\"     Please call pt when office reopens.   "

## 2025-05-09 NOTE — TELEPHONE ENCOUNTER
Spoke with patient  when called in and they wanted to confirm the next office visit date .       Instructed the patient that an order was placed to have a  VAS carotid complete study  done  again       Gave the patient the number for central scheduling to set that up sometime before next office visit 9/11/2025

## 2025-05-22 DIAGNOSIS — E78.2 MIXED HYPERLIPIDEMIA: ICD-10-CM

## 2025-05-23 RX ORDER — FENOFIBRATE 48 MG/1
48 TABLET, FILM COATED ORAL DAILY
Qty: 90 TABLET | Refills: 1 | Status: SHIPPED | OUTPATIENT
Start: 2025-05-23

## 2025-05-28 ENCOUNTER — HOSPITAL ENCOUNTER (OUTPATIENT)
Dept: NON INVASIVE DIAGNOSTICS | Facility: CLINIC | Age: 70
Discharge: HOME | End: 2025-05-28
Payer: MEDICARE

## 2025-05-28 DIAGNOSIS — Z86.73 HISTORY OF STROKE: ICD-10-CM

## 2025-05-28 DIAGNOSIS — I65.21 OCCLUSION OF RIGHT CAROTID ARTERY: ICD-10-CM

## 2025-05-28 PROCEDURE — 93880 EXTRACRANIAL BILAT STUDY: CPT

## 2025-05-29 PROCEDURE — 93880 EXTRACRANIAL BILAT STUDY: CPT | Performed by: SURGERY

## 2025-07-15 ENCOUNTER — OFFICE VISIT (OUTPATIENT)
Dept: FAMILY MEDICINE CLINIC | Facility: CLINIC | Age: 70
End: 2025-07-15

## 2025-07-15 VITALS
SYSTOLIC BLOOD PRESSURE: 114 MMHG | HEART RATE: 62 BPM | OXYGEN SATURATION: 97 % | DIASTOLIC BLOOD PRESSURE: 70 MMHG | TEMPERATURE: 97.3 F | BODY MASS INDEX: 28.28 KG/M2 | HEIGHT: 71 IN | WEIGHT: 202 LBS

## 2025-07-15 DIAGNOSIS — I65.21 OCCLUSION OF RIGHT CAROTID ARTERY: ICD-10-CM

## 2025-07-15 DIAGNOSIS — I10 ESSENTIAL HYPERTENSION: ICD-10-CM

## 2025-07-15 DIAGNOSIS — I77.71 DISSECTION OF CAROTID ARTERY (HCC): ICD-10-CM

## 2025-07-15 DIAGNOSIS — E78.2 MIXED HYPERLIPIDEMIA: Chronic | ICD-10-CM

## 2025-07-15 DIAGNOSIS — Z00.00 MEDICARE ANNUAL WELLNESS VISIT, SUBSEQUENT: Primary | ICD-10-CM

## 2025-07-15 DIAGNOSIS — Z12.5 SCREENING FOR PROSTATE CANCER: ICD-10-CM

## 2025-07-15 DIAGNOSIS — Z86.73 HISTORY OF STROKE: ICD-10-CM

## 2025-07-15 NOTE — PROGRESS NOTES
Name: Nick Murillo Jr.      : 1955      MRN: [id number]  Encounter Provider: Lai Capone MD  Encounter Date: 7/15/2025   Encounter department: [facility]  :  Assessment & Plan  Medicare annual wellness visit, subsequent         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(HCC)  No new neurologic symptoms. Repeat scan in May was unchanged. Continue present care. F/u with vasc surgery. Recheck 6m       Essential hypertension  Well controlled. Cont present treatment. Monitor labs. Recheck 6m  Orders:  •  CBC and differential; Future  •  Comprehensive metabolic panel; Future  •  Lipid panel; Future    Mixed hyperlipidemia  Due for labs - ordered. Recheck 6m  Orders:  •  Comprehensive metabolic panel; Future  •  Lipid panel; Future    Occlusion of right carotid artery  Secondary to dissection - unchanged on recent scan.  Continue to monitor       History of stroke  No new symptoms. Continue to monitor. Recheck 6m       Screening for prostate cancer    Orders:  •  PSA, Total Screen; Future       Preventive health issues were discussed with patient, and age appropriate screening tests were ordered as noted in patient's After Visit Summary. Personalized health advice and appropriate referrals for health education or preventive services given if needed, as noted in patient's After Visit Summary.    History of Present Illness     f/u multiple med issues and AWV  - continues to struggle with grief since loss of his son 1 year ago.  Pt and wife babysit the grandchildren a couple time a week.   - pt was seen by Neuro in May.  No new neurologic symptoms.     - pt denies CP, palpitations, lightheadedness or other CV symptoms with or without exertion  - pt denies any new GI or  complaints  - pt denies any new musculoskeletal symptoms  - AWV done       Patient Care Team:  Lai Capone MD as PCP - General  Marni Hughes MD as Endoscopist    Review of Systems   Constitutional: Negative.     HENT: Negative.  Negative for congestion, ear discharge, ear pain, hearing loss, sinus pressure and sinus pain.    Eyes: Negative.    Respiratory: Negative.     Cardiovascular: Negative.    Gastrointestinal: Negative.    Genitourinary: Negative.    Musculoskeletal:  Positive for arthralgias and myalgias. Negative for gait problem, joint swelling, neck pain and neck stiffness.   Skin: Negative.    Neurological: Negative.  Negative for dizziness, weakness, light-headedness, numbness and headaches.   Psychiatric/Behavioral:  Positive for dysphoric mood (intermittent due to grief). Negative for agitation, confusion, decreased concentration and sleep disturbance. The patient is not nervous/anxious.      Medical History Reviewed by provider this encounter:  Tobacco  Allergies  Meds  Problems  Med Hx  Surg Hx  Fam Hx       Annual Wellness Visit Questionnaire   Nick is here for his Subsequent Wellness visit. Last Medicare Wellness visit information reviewed, patient interviewed and updates made to the record today.      Health Risk Assessment:   Patient rates overall health as good. Patient feels that their physical health rating is slightly better. Patient is satisfied with their life. Eyesight was rated as same. Hearing was rated as same. Patient feels that their emotional and mental health rating is slightly worse. Patients states they are sometimes angry. Patient states they are sometimes unusually tired/fatigued. Pain experienced in the last 7 days has been none. Patient states that he has experienced no weight loss or gain in last 6 months.     Depression Screening:   PHQ-2 Score: 1      Fall Risk Screening:   In the past year, patient has experienced: no history of falling in past year      Home Safety:  Patient does not have trouble with stairs inside or outside of their home. Patient has working smoke alarms and has no working carbon monoxide detector. Home safety hazards include: none.     Nutrition:    Current diet is Regular, No Added Salt and Limited junk food.     Medications:   Patient is not currently taking any over-the-counter supplements. Patient is able to manage medications.     Activities of Daily Living (ADLs)/Instrumental Activities of Daily Living (IADLs):   Walk and transfer into and out of bed and chair?: Yes  Dress and groom yourself?: Yes    Bathe or shower yourself?: Yes    Feed yourself? Yes  Do your laundry/housekeeping?: Yes  Manage your money, pay your bills and track your expenses?: Yes  Make your own meals?: Yes    Do your own shopping?: Yes    Previous Hospitalizations:   Any hospitalizations or ED visits within the last 12 months?: No      Advance Care Planning:   Living will: Yes    Durable POA for healthcare: Yes    Advanced directive: Yes    Advanced directive counseling given: Yes      Cognitive Screening:   Provider or family/friend/caregiver concerned regarding cognition?: No    Preventive Screenings      Cardiovascular Screening:    General: Screening Not Indicated and History Lipid Disorder      Diabetes Screening:     General: Risks and Benefits Discussed    Due for: Blood Glucose      Colorectal Cancer Screening:     General: Screening Current      Prostate Cancer Screening:    General: Risks and Benefits Discussed    Due for: PSA      Osteoporosis Screening:    General: Screening Not Indicated      Abdominal Aortic Aneurysm (AAA) Screening:    Risk factors include: age between 65-74 yo        Lung Cancer Screening:     General: Screening Not Indicated      Hepatitis C Screening:    General: Screening Current    Immunizations:  - Immunizations due: Prevnar 20    Screening, Brief Intervention, and Referral to Treatment (SBIRT)     Screening  Typical number of drinks in a day: 0  Typical number of drinks in a week: 1  Interpretation: Low risk drinking behavior.    AUDIT-C Screenin) How often did you have a drink containing alcohol in the past year? monthly or less  2) How  "many drinks did you have on a typical day when you were drinking in the past year? 0  3) How often did you have 6 or more drinks on one occasion in the past year? never    AUDIT-C Score: 1  Interpretation: Score 0-3 (male): Negative screen for alcohol misuse    Single Item Drug Screening:  How often have you used an illegal drug (including marijuana) or a prescription medication for non-medical reasons in the past year? never    Single Item Drug Screen Score: 0  Interpretation: Negative screen for possible drug use disorder    Time Spent  Time spent screening/evaluating the patient for alcohol misuse: 1 minutes.     Annual Depression Screening  Time spent screening and evaluating the patient for depression during today's encounter was 5 minutes.    Other Counseling Topics:   Calcium and vitamin D intake and regular weightbearing exercise.     Social Drivers of Health     Financial Resource Strain: Low Risk  (4/14/2023)    Overall Financial Resource Strain (CARDIA)    • Difficulty of Paying Living Expenses: Not hard at all   Food Insecurity: No Food Insecurity (7/10/2025)    Nursing - Inadequate Food Risk Classification    • Worried About Running Out of Food in the Last Year: Never true    • Ran Out of Food in the Last Year: Never true   Transportation Needs: No Transportation Needs (7/10/2025)    PRAPARE - Transportation    • Lack of Transportation (Medical): No    • Lack of Transportation (Non-Medical): No   Housing Stability: Low Risk  (7/10/2025)    Housing Stability Vital Sign    • Unable to Pay for Housing in the Last Year: No    • Number of Times Moved in the Last Year: 0    • Homeless in the Last Year: No   Utilities: Not At Risk (7/10/2025)    [facility] Utilities    • Threatened with loss of utilities: No     No results found.    Objective   /70   Pulse 62   Temp (!) 97.3 °F (36.3 °C)   Ht 5' 11\" (1.803 m)   Wt 91.6 kg (202 lb)   SpO2 97%   BMI 28.17 kg/m²     Physical Exam  Vitals reviewed. "   Constitutional:       Appearance: Normal appearance.   HENT:      Head: Normocephalic.      Right Ear: Tympanic membrane, ear canal and external ear normal.      Left Ear: Tympanic membrane, ear canal and external ear normal.      Nose: Nose normal.      Mouth/Throat:      Mouth: Mucous membranes are moist.     Eyes:      Extraocular Movements: Extraocular movements intact.      Conjunctiva/sclera: Conjunctivae normal.      Pupils: Pupils are equal, round, and reactive to light.     Neck:      Vascular: No carotid bruit.     Cardiovascular:      Rate and Rhythm: Normal rate and regular rhythm.      Pulses: Normal pulses.   Pulmonary:      Effort: Pulmonary effort is normal.      Breath sounds: Normal breath sounds.   Abdominal:      General: There is no distension.      Palpations: There is no mass.      Tenderness: There is no abdominal tenderness.     Musculoskeletal:         General: No swelling or tenderness. Normal range of motion.      Cervical back: Normal range of motion. No tenderness.      Right lower leg: No edema.      Left lower leg: No edema.   Lymphadenopathy:      Cervical: No cervical adenopathy.     Skin:     General: Skin is warm.      Capillary Refill: Capillary refill takes less than 2 seconds.     Neurological:      General: No focal deficit present.      Mental Status: He is alert and oriented to person, place, and time.     Psychiatric:         Behavior: Behavior normal.         Thought Content: Thought content normal.         Judgment: Judgment normal.      Comments: PHQ-2/9 Depression Screening    Little interest or pleasure in doing things: 0 - not at all  Feeling down, depressed, or hopeless: 1 - several days  PHQ-2 Score: 1  PHQ-2 Interpretation: Negative depression screen

## 2025-07-15 NOTE — ASSESSMENT & PLAN NOTE
Due for labs - ordered. Recheck 6m  Orders:  •  Comprehensive metabolic panel; Future  •  Lipid panel; Future

## 2025-07-15 NOTE — ASSESSMENT & PLAN NOTE
No new neurologic symptoms. Repeat scan in May was unchanged. Continue present care. F/u with vasc surgery. Recheck 6m

## 2025-07-18 ENCOUNTER — APPOINTMENT (OUTPATIENT)
Dept: LAB | Facility: CLINIC | Age: 70
End: 2025-07-18
Payer: MEDICARE

## 2025-07-18 DIAGNOSIS — I10 ESSENTIAL HYPERTENSION: ICD-10-CM

## 2025-07-18 DIAGNOSIS — E78.2 MIXED HYPERLIPIDEMIA: Chronic | ICD-10-CM

## 2025-07-18 DIAGNOSIS — Z12.5 SCREENING FOR PROSTATE CANCER: ICD-10-CM

## 2025-07-18 LAB
ALBUMIN SERPL BCG-MCNC: 4.2 G/DL (ref 3.5–5)
ALP SERPL-CCNC: 59 U/L (ref 34–104)
ALT SERPL W P-5'-P-CCNC: 13 U/L (ref 7–52)
ANION GAP SERPL CALCULATED.3IONS-SCNC: 8 MMOL/L (ref 4–13)
AST SERPL W P-5'-P-CCNC: 21 U/L (ref 13–39)
BASOPHILS # BLD AUTO: 0.08 THOUSANDS/ÂΜL (ref 0–0.1)
BASOPHILS NFR BLD AUTO: 1 % (ref 0–1)
BILIRUB SERPL-MCNC: 0.73 MG/DL (ref 0.2–1)
BUN SERPL-MCNC: 17 MG/DL (ref 5–25)
CALCIUM SERPL-MCNC: 9.4 MG/DL (ref 8.4–10.2)
CHLORIDE SERPL-SCNC: 107 MMOL/L (ref 96–108)
CHOLEST SERPL-MCNC: 114 MG/DL (ref ?–200)
CO2 SERPL-SCNC: 27 MMOL/L (ref 21–32)
CREAT SERPL-MCNC: 1.06 MG/DL (ref 0.6–1.3)
EOSINOPHIL # BLD AUTO: 0.3 THOUSAND/ÂΜL (ref 0–0.61)
EOSINOPHIL NFR BLD AUTO: 4 % (ref 0–6)
ERYTHROCYTE [DISTWIDTH] IN BLOOD BY AUTOMATED COUNT: 12.9 % (ref 11.6–15.1)
GFR SERPL CREATININE-BSD FRML MDRD: 70 ML/MIN/1.73SQ M
GLUCOSE P FAST SERPL-MCNC: 94 MG/DL (ref 65–99)
HCT VFR BLD AUTO: 44.2 % (ref 36.5–49.3)
HDLC SERPL-MCNC: 38 MG/DL
HGB BLD-MCNC: 14.6 G/DL (ref 12–17)
IMM GRANULOCYTES # BLD AUTO: 0.03 THOUSAND/UL (ref 0–0.2)
IMM GRANULOCYTES NFR BLD AUTO: 0 % (ref 0–2)
LDLC SERPL CALC-MCNC: 66 MG/DL (ref 0–100)
LYMPHOCYTES # BLD AUTO: 1.07 THOUSANDS/ÂΜL (ref 0.6–4.47)
LYMPHOCYTES NFR BLD AUTO: 15 % (ref 14–44)
MCH RBC QN AUTO: 29.8 PG (ref 26.8–34.3)
MCHC RBC AUTO-ENTMCNC: 33 G/DL (ref 31.4–37.4)
MCV RBC AUTO: 90 FL (ref 82–98)
MONOCYTES # BLD AUTO: 0.59 THOUSAND/ÂΜL (ref 0.17–1.22)
MONOCYTES NFR BLD AUTO: 9 % (ref 4–12)
NEUTROPHILS # BLD AUTO: 4.86 THOUSANDS/ÂΜL (ref 1.85–7.62)
NEUTS SEG NFR BLD AUTO: 71 % (ref 43–75)
NONHDLC SERPL-MCNC: 76 MG/DL
NRBC BLD AUTO-RTO: 0 /100 WBCS
PLATELET # BLD AUTO: 230 THOUSANDS/UL (ref 149–390)
PMV BLD AUTO: 9.9 FL (ref 8.9–12.7)
POTASSIUM SERPL-SCNC: 4 MMOL/L (ref 3.5–5.3)
PROT SERPL-MCNC: 6.7 G/DL (ref 6.4–8.4)
PSA SERPL-MCNC: 1.99 NG/ML (ref 0–4)
RBC # BLD AUTO: 4.9 MILLION/UL (ref 3.88–5.62)
SODIUM SERPL-SCNC: 142 MMOL/L (ref 135–147)
TRIGL SERPL-MCNC: 50 MG/DL (ref ?–150)
WBC # BLD AUTO: 6.93 THOUSAND/UL (ref 4.31–10.16)

## 2025-07-18 PROCEDURE — 80053 COMPREHEN METABOLIC PANEL: CPT

## 2025-07-18 PROCEDURE — G0103 PSA SCREENING: HCPCS

## 2025-07-18 PROCEDURE — 80061 LIPID PANEL: CPT

## 2025-07-18 PROCEDURE — 85025 COMPLETE CBC W/AUTO DIFF WBC: CPT

## 2025-07-18 PROCEDURE — 36415 COLL VENOUS BLD VENIPUNCTURE: CPT

## 2025-08-06 DIAGNOSIS — I63.9 ISCHEMIC STROKE (HCC): ICD-10-CM

## 2025-08-07 RX ORDER — ATORVASTATIN CALCIUM 40 MG/1
40 TABLET, FILM COATED ORAL EVERY EVENING
Qty: 100 TABLET | Refills: 1 | Status: SHIPPED | OUTPATIENT
Start: 2025-08-07

## 2025-08-21 DIAGNOSIS — E78.2 MIXED HYPERLIPIDEMIA: ICD-10-CM

## 2025-08-22 RX ORDER — FENOFIBRATE 48 MG/1
48 TABLET, FILM COATED ORAL DAILY
Qty: 90 TABLET | Refills: 1 | Status: SHIPPED | OUTPATIENT
Start: 2025-08-22